# Patient Record
Sex: FEMALE | Race: WHITE | NOT HISPANIC OR LATINO | Employment: OTHER | URBAN - NONMETROPOLITAN AREA
[De-identification: names, ages, dates, MRNs, and addresses within clinical notes are randomized per-mention and may not be internally consistent; named-entity substitution may affect disease eponyms.]

---

## 2017-01-20 DIAGNOSIS — Z12.31 ENCOUNTER FOR SCREENING MAMMOGRAM FOR MALIGNANT NEOPLASM OF BREAST: Primary | ICD-10-CM

## 2017-01-24 ENCOUNTER — TRANSCRIBE ORDERS (OUTPATIENT)
Dept: LAB | Facility: OTHER | Age: 62
End: 2017-01-24

## 2017-01-24 ENCOUNTER — LAB (OUTPATIENT)
Dept: LAB | Facility: OTHER | Age: 62
End: 2017-01-24

## 2017-01-24 DIAGNOSIS — E83.42 HYPOMAGNESEMIA: ICD-10-CM

## 2017-01-24 DIAGNOSIS — E11.9 DIABETES MELLITUS WITH NO COMPLICATION (HCC): ICD-10-CM

## 2017-01-24 DIAGNOSIS — D50.9 IRON DEFICIENCY ANEMIA, UNSPECIFIED: ICD-10-CM

## 2017-01-24 DIAGNOSIS — I10 HYPERTENSION, ESSENTIAL: ICD-10-CM

## 2017-01-24 DIAGNOSIS — D50.9 IRON DEFICIENCY ANEMIA, UNSPECIFIED: Primary | ICD-10-CM

## 2017-01-24 LAB
ALBUMIN SERPL-MCNC: 4.3 G/DL (ref 3.2–5.5)
ALBUMIN/GLOB SERPL: 1.7 G/DL (ref 1–3)
ALP SERPL-CCNC: 99 U/L (ref 15–121)
ALT SERPL W P-5'-P-CCNC: 20 U/L (ref 10–60)
ANION GAP SERPL CALCULATED.3IONS-SCNC: 8 MMOL/L (ref 5–15)
ARTICHOKE IGE QN: 78 MG/DL (ref 0–129)
AST SERPL-CCNC: 23 U/L (ref 10–60)
BILIRUB SERPL-MCNC: 2.2 MG/DL (ref 0.2–1)
BUN BLD-MCNC: 17 MG/DL (ref 8–25)
BUN/CREAT SERPL: 15.5 (ref 7–25)
CALCIUM SPEC-SCNC: 9.5 MG/DL (ref 8.4–10.8)
CHLORIDE SERPL-SCNC: 105 MMOL/L (ref 100–112)
CO2 SERPL-SCNC: 29 MMOL/L (ref 20–32)
CREAT BLD-MCNC: 1.1 MG/DL (ref 0.4–1.3)
DEPRECATED RDW RBC AUTO: 52.3 FL (ref 36.4–46.3)
ERYTHROCYTE [DISTWIDTH] IN BLOOD BY AUTOMATED COUNT: 15.7 % (ref 11.5–14.5)
GFR SERPL CREATININE-BSD FRML MDRD: 50 ML/MIN/1.73 (ref 45–104)
GLOBULIN UR ELPH-MCNC: 2.6 GM/DL (ref 2.5–4.6)
GLUCOSE BLD-MCNC: 138 MG/DL (ref 70–100)
HCT VFR BLD AUTO: 39.3 % (ref 35–45)
HGB BLD-MCNC: 12.4 G/DL (ref 12–15.5)
MAGNESIUM SERPL-MCNC: 1.6 MG/DL (ref 1.8–2.5)
MCH RBC QN AUTO: 30 PG (ref 26.5–34)
MCHC RBC AUTO-ENTMCNC: 31.6 G/DL (ref 31.4–36)
MCV RBC AUTO: 94.9 FL (ref 80–98)
PLATELET # BLD AUTO: 111 10*3/MM3 (ref 150–450)
PMV BLD AUTO: 10.2 FL (ref 8–12)
POTASSIUM BLD-SCNC: 4.1 MMOL/L (ref 3.4–5.4)
PROT SERPL-MCNC: 6.9 G/DL (ref 6.7–8.2)
RBC # BLD AUTO: 4.14 10*6/MM3 (ref 3.77–5.16)
SODIUM BLD-SCNC: 142 MMOL/L (ref 134–146)
WBC NRBC COR # BLD: 75.84 10*3/MM3 (ref 3.2–9.8)

## 2017-01-24 PROCEDURE — 83036 HEMOGLOBIN GLYCOSYLATED A1C: CPT | Performed by: INTERNAL MEDICINE

## 2017-01-24 PROCEDURE — 80053 COMPREHEN METABOLIC PANEL: CPT | Performed by: INTERNAL MEDICINE

## 2017-01-24 PROCEDURE — 85027 COMPLETE CBC AUTOMATED: CPT | Performed by: INTERNAL MEDICINE

## 2017-01-24 PROCEDURE — 83721 ASSAY OF BLOOD LIPOPROTEIN: CPT | Performed by: INTERNAL MEDICINE

## 2017-01-24 PROCEDURE — 83735 ASSAY OF MAGNESIUM: CPT | Performed by: INTERNAL MEDICINE

## 2017-01-25 LAB — HBA1C MFR BLD: 6.75 % (ref 4–5.6)

## 2017-01-31 ENCOUNTER — OFFICE VISIT (OUTPATIENT)
Dept: FAMILY MEDICINE CLINIC | Facility: CLINIC | Age: 62
End: 2017-01-31

## 2017-01-31 VITALS
DIASTOLIC BLOOD PRESSURE: 70 MMHG | BODY MASS INDEX: 35.85 KG/M2 | WEIGHT: 210 LBS | HEIGHT: 64 IN | HEART RATE: 78 BPM | SYSTOLIC BLOOD PRESSURE: 118 MMHG

## 2017-01-31 DIAGNOSIS — E78.2 MIXED HYPERLIPIDEMIA: ICD-10-CM

## 2017-01-31 DIAGNOSIS — E66.09 NON MORBID OBESITY DUE TO EXCESS CALORIES: ICD-10-CM

## 2017-01-31 DIAGNOSIS — E11.9 TYPE 2 DIABETES MELLITUS WITHOUT COMPLICATION, WITHOUT LONG-TERM CURRENT USE OF INSULIN (HCC): Primary | ICD-10-CM

## 2017-01-31 DIAGNOSIS — C91.10 CHRONIC LYMPHATIC LEUKEMIA (HCC): ICD-10-CM

## 2017-01-31 DIAGNOSIS — D50.0 IRON DEFICIENCY ANEMIA SECONDARY TO BLOOD LOSS (CHRONIC): ICD-10-CM

## 2017-01-31 DIAGNOSIS — I10 ESSENTIAL HYPERTENSION: ICD-10-CM

## 2017-01-31 DIAGNOSIS — E83.42 HYPOMAGNESEMIA: ICD-10-CM

## 2017-01-31 DIAGNOSIS — E55.9 VITAMIN D DEFICIENCY: ICD-10-CM

## 2017-01-31 PROCEDURE — 99214 OFFICE O/P EST MOD 30 MIN: CPT | Performed by: INTERNAL MEDICINE

## 2017-01-31 RX ORDER — CARVEDILOL 6.25 MG/1
6.25 TABLET ORAL 2 TIMES DAILY WITH MEALS
Qty: 180 TABLET | Refills: 3 | Status: SHIPPED | OUTPATIENT
Start: 2017-01-31 | End: 2017-08-08 | Stop reason: SDUPTHER

## 2017-01-31 RX ORDER — ALPRAZOLAM 0.5 MG/1
.5-1 TABLET ORAL NIGHTLY PRN
Qty: 60 TABLET | Refills: 2 | Status: SHIPPED | OUTPATIENT
Start: 2017-01-31 | End: 2017-05-16 | Stop reason: SDUPTHER

## 2017-01-31 RX ORDER — ATORVASTATIN CALCIUM 40 MG/1
40 TABLET, FILM COATED ORAL DAILY
Qty: 90 TABLET | Refills: 3 | Status: SHIPPED | OUTPATIENT
Start: 2017-01-31 | End: 2017-08-08 | Stop reason: SDUPTHER

## 2017-01-31 RX ORDER — SPIRONOLACTONE 25 MG/1
25 TABLET ORAL 2 TIMES DAILY
Qty: 180 TABLET | Refills: 3 | Status: SHIPPED | OUTPATIENT
Start: 2017-01-31 | End: 2017-08-08 | Stop reason: SDUPTHER

## 2017-01-31 RX ORDER — FLUOXETINE 10 MG/1
30 CAPSULE ORAL EVERY MORNING
Qty: 270 CAPSULE | Refills: 3 | Status: SHIPPED | OUTPATIENT
Start: 2017-01-31 | End: 2017-08-08 | Stop reason: SDUPTHER

## 2017-01-31 RX ORDER — LISINOPRIL AND HYDROCHLOROTHIAZIDE 20; 12.5 MG/1; MG/1
1 TABLET ORAL EVERY MORNING
Qty: 90 TABLET | Refills: 3 | Status: SHIPPED | OUTPATIENT
Start: 2017-01-31 | End: 2017-08-08 | Stop reason: DRUGHIGH

## 2017-01-31 NOTE — MR AVS SNAPSHOT
Chio Mary   1/31/2017 10:00 AM   Office Visit    Dept Phone:  [phone]   Encounter #:  [id number]    Provider:  Michael Quiñonez MD   Department:  [facility]                Your Full Care Plan              Where to Get Your Medications      These medications were sent to [facility] - [place], [place] - [address] AT Portal to [facility] - [phone] [facility] [phone]   [address], [facility] [address]     Phone:  [phone]     atorvastatin 40 MG tablet    carvedilol 6.25 MG tablet    FLUoxetine 10 MG capsule    lisinopril-hydrochlorothiazide 20-12.5 MG per tablet    metFORMIN 500 MG tablet    spironolactone 25 MG tablet         You can get these medications from any pharmacy     Bring a paper prescription for each of these medications     ALPRAZolam 0.5 MG tablet            Your Updated Medication List          This list is accurate as of: 1/31/17 11:04 AM.  Always use your most recent med list.                ALPRAZolam 0.5 MG tablet   Commonly known as:  XANAX   Take 1-2 tablets by mouth At Night As Needed for sleep.       aspirin 81 MG EC tablet       atorvastatin 40 MG tablet   Commonly known as:  LIPITOR   Take 1 tablet by mouth Daily.       CALCI-MIX 1250 MG capsule   Generic drug:  calcium carbonate       carvedilol 6.25 MG tablet   Commonly known as:  COREG   Take 1 tablet by mouth 2 (Two) Times a Day With Meals.       cholecalciferol 1000 UNITS tablet   Commonly known as:  VITAMIN D3       FLUoxetine 10 MG capsule   Commonly known as:  PROzac   Take 3 capsules by mouth Every Morning.       lisinopril-hydrochlorothiazide 20-12.5 MG per tablet   Commonly known as:  PRINZIDE,ZESTORETIC   Take 1 tablet by mouth Every Morning.       magnesium oxide 400 (241.3 MG) MG tablet tablet   Commonly known as:  MAGOX       metFORMIN 500 MG tablet   Commonly known as:  GLUCOPHAGE   Take 1 tablet  by mouth 2 (Two) Times a Day With Meals.       omeprazole 40 MG capsule   Commonly known as:  priLOSEC       potassium chloride 10 MEQ CR capsule   Commonly known as:  MICRO-K       spironolactone 25 MG tablet   Commonly known as:  ALDACTONE   Take 1 tablet by mouth 2 (Two) Times a Day.       vitamin B-12 500 MCG tablet   Commonly known as:  CYANOCOBALAMIN               You Were Diagnosed With        Codes Comments    Essential hypertension    -  Primary ICD-10-CM: I10  ICD-9-CM: 401.9     Mixed hyperlipidemia     ICD-10-CM: E78.2  ICD-9-CM: 272.2     Non morbid obesity due to excess calories     ICD-10-CM: E66.09  ICD-9-CM: 278.00     Vitamin D deficiency     ICD-10-CM: E55.9  ICD-9-CM: 268.9     Type 2 diabetes mellitus without complication, without long-term current use of insulin     ICD-10-CM: E11.9  ICD-9-CM: 250.00     Chronic lymphatic leukemia     ICD-10-CM: C91.10  ICD-9-CM: 204.10     Iron deficiency anemia secondary to blood loss (chronic)     ICD-10-CM: D50.0  ICD-9-CM: 280.0     Hypomagnesemia     ICD-10-CM: E83.42  ICD-9-CM: 275.2       Instructions     None    Patient Instructions History      Upcoming Appointments     Visit Type Date Time Department    OFFICE VISIT 2017 10:00 AM [facility]    MAMMO SCREENING 2017  1:00 PM [facility]    OFFICE VISIT 2017  9:30 AM [facility]      MyChart Signup     [facility] allows you to send messages to your doctor, view your test results, renew your prescriptions, schedule appointments, and more. To sign up, go to [email or web address] and click on the Sign Up Now link in the New User? box. Enter your [facility] Activation Code exactly as it appears below along with the last four digits of your Social Security Number and your Date of Birth () to complete the sign-up process. If you do not sign up before the expiration date, you must request a new code.    [facility] Activation Code: [email or web address]  Expires: 2017   "8:47 AM    If you have questions, you can email [email or web address] or call [phone] to talk to our MyChart staff. Remember, [employer] is NOT to be used for urgent needs. For medical emergencies, dial 911.               Other Info from Your Visit           Your Appointments     Feb 07, 2017  1:00 PM CST   Mammography Screening with [facility] MAMM 1   [facility]    [address] [place] [address] [phone]            Aug 01, 2017  9:30 AM CDT   Office Visit with Michael Quiñonez MD   [facility] [place] (--)    [address] [place] [address] [address] [place] [address] [phone]           Arrive 15 minutes prior to appointment.              Allergies     Nsaids        Reason for Visit     Follow-up 6 month    Results lab      Vital Signs     Blood Pressure Pulse Height Weight Body Mass Index Smoking Status    118/70 78 64\" (162.6 cm) 210 lb (95.3 kg) 36.05 kg/m2 Former Smoker      Problems and Diagnoses Noted     Chronic lymphatic leukemia    High blood pressure    Hypomagnesemia    Iron deficiency anemia secondary to blood loss (chronic)    Mixed hyperlipidemia    Obesity    Type 2 diabetes    Vitamin D deficiency        "

## 2017-01-31 NOTE — PROGRESS NOTES
"Subjective     Chio Mary is a 61 y.o. female.     History of Present Illness   Chio is here for followup of diabetes, hypertension, high cholesterol, CLL, vitamin D deficiency, obesity, and other issues.  She has a single kidney due to prior right nephrectomy due to RCCA in 2003.    Dr. Soriano in [place] is following her CLL.  White count is considerably higher with the last 2 set of labs.  He is scheduled to see her next week.  She denies night sweats or unexplained weight loss.    She has a history of renal cell carcinoma requiring right nephrectomy approximately 12 years ago.  There is been no evidence of recurrence.    Her weight is up 14 pounds in the past 6 months.  Her blood pressure is well controlled.    She had to stop the magnesium replacement due to diarrhea.  She can only tolerate metformin once daily due to diarrhea.  Diarrhea is adequately controlled currently.    Her labs are all reviewed with results listed below.  A1c is 6.7.  White count is 76,000.  Magnesium is 1.6.  Creatinine is 1.1.  Liver functions are normal.      Review of Systems   Constitutional: Negative for chills, fatigue, fever and unexpected weight change.   HENT: Negative for congestion, ear pain, postnasal drip, sinus pressure and sore throat.    Respiratory: Negative for cough, shortness of breath and wheezing.    Cardiovascular: Negative for chest pain, palpitations and leg swelling.   Gastrointestinal: Negative for abdominal pain, blood in stool, constipation, diarrhea, nausea and vomiting.   Endocrine: Negative for cold intolerance, heat intolerance, polydipsia and polyuria.   Genitourinary: Negative for dysuria, frequency, hematuria and urgency.   Skin: Negative for rash.   Neurological: Negative for syncope and weakness.   Hematological: Does not bruise/bleed easily.   Psychiatric/Behavioral: Negative for dysphoric mood and suicidal ideas.       Objective     Visit Vitals   • /70   • Pulse 78   • Ht 64\" " (162.6 cm)   • Wt 210 lb (95.3 kg)   • BMI 36.05 kg/m2       Physical Exam   Constitutional: She is oriented to person, place, and time. She appears well-developed and well-nourished. No distress.   Very pleasant, obese.   HENT:   Head: Normocephalic and atraumatic.   Nose: Right sinus exhibits no maxillary sinus tenderness and no frontal sinus tenderness. Left sinus exhibits no maxillary sinus tenderness and no frontal sinus tenderness.   Mouth/Throat: Uvula is midline, oropharynx is clear and moist and mucous membranes are normal. No oral lesions. No tonsillar exudate.   Eyes: Conjunctivae and EOM are normal. Pupils are equal, round, and reactive to light.   Neck: Trachea normal. Neck supple. No JVD present. Carotid bruit is not present. No tracheal deviation present. No thyroid mass and no thyromegaly present.   Cardiovascular: Normal rate, regular rhythm and normal heart sounds.   No extrasystoles are present. PMI is not displaced.    No murmur heard.  Pulmonary/Chest: Effort normal and breath sounds normal. No accessory muscle usage. No respiratory distress. She has no decreased breath sounds. She has no wheezes. She has no rhonchi. She has no rales.   Abdominal: Soft. Bowel sounds are normal. She exhibits no distension. There is no hepatosplenomegaly. There is no tenderness.   Obese abdomen limits exam.       Vascular Status -  Her exam exhibits right foot vasculature normal. Her exam exhibits no right foot edema. Her exam exhibits left foot vasculature normal. Her exam exhibits no left foot edema.  Lymphadenopathy:     She has no cervical adenopathy.   Neurological: She is alert and oriented to person, place, and time. No cranial nerve deficit. Coordination normal.   Skin: Skin is warm, dry and intact. No rash noted. No cyanosis. Nails show no clubbing.   Psychiatric: She has a normal mood and affect. Her speech is normal and behavior is normal. Judgment and thought content normal.   Vitals  reviewed.      Assessment/Plan   Continue current medications.  Intensify efforts at diabetic diet, exercise, and weight loss.  Check glucose daily and notify me if glucose is not consistently at goal within a couple weeks.  If not at goal, we would need to have a second oral agent, as she cannot tolerate higher doses of metformin.  Diagnoses and all orders for this visit:    Type 2 diabetes mellitus without complication, without long-term current use of insulin  -     Comprehensive Metabolic Panel; Future  -     Hemoglobin A1c; Future  -     TSH; Future    Essential hypertension  -     CBC Auto Differential; Future  -     Comprehensive Metabolic Panel; Future    Mixed hyperlipidemia  -     Lipid Panel; Future    Non morbid obesity due to excess calories    Vitamin D deficiency    Chronic lymphatic leukemia  -     Vitamin B12; Future    Iron deficiency anemia secondary to blood loss (chronic)  -     Vitamin B12; Future  -     Ferritin; Future    Hypomagnesemia  -     Magnesium; Future    Other orders  -     spironolactone (ALDACTONE) 25 MG tablet; Take 1 tablet by mouth 2 (Two) Times a Day.  -     FLUoxetine (PROzac) 10 MG capsule; Take 3 capsules by mouth Every Morning.  -     metFORMIN (GLUCOPHAGE) 500 MG tablet; Take 1 tablet by mouth 2 (Two) Times a Day With Meals.  -     carvedilol (COREG) 6.25 MG tablet; Take 1 tablet by mouth 2 (Two) Times a Day With Meals.  -     lisinopril-hydrochlorothiazide (PRINZIDE,ZESTORETIC) 20-12.5 MG per tablet; Take 1 tablet by mouth Every Morning.  -     atorvastatin (LIPITOR) 40 MG tablet; Take 1 tablet by mouth Daily.  -     ALPRAZolam (XANAX) 0.5 MG tablet; Take 1-2 tablets by mouth At Night As Needed for sleep.        Lab on 01/24/2017   Component Date Value Ref Range Status   • Hemoglobin A1C 01/24/2017 6.75* 4 - 5.6 % Final   • LDL Cholesterol  01/24/2017 78  0 - 129 mg/dL Final   • WBC 01/24/2017 75.84* 3.20 - 9.80 10*3/mm3 Final   • RBC 01/24/2017 4.14  3.77 - 5.16  10*6/mm3 Final   • Hemoglobin 01/24/2017 12.4  12.0 - 15.5 g/dL Final   • Hematocrit 01/24/2017 39.3  35.0 - 45.0 % Final   • MCV 01/24/2017 94.9  80.0 - 98.0 fL Final   • MCH 01/24/2017 30.0  26.5 - 34.0 pg Final   • MCHC 01/24/2017 31.6  31.4 - 36.0 g/dL Final   • RDW 01/24/2017 15.7* 11.5 - 14.5 % Final   • RDW-SD 01/24/2017 52.3* 36.4 - 46.3 fl Final   • MPV 01/24/2017 10.2  8.0 - 12.0 fL Final   • Platelets 01/24/2017 111* 150 - 450 10*3/mm3 Final   • Magnesium 01/24/2017 1.6* 1.8 - 2.5 mg/dL Final   • Glucose 01/24/2017 138* 70 - 100 mg/dL Final   • BUN 01/24/2017 17  8 - 25 mg/dL Final   • Creatinine 01/24/2017 1.10  0.40 - 1.30 mg/dL Final   • Sodium 01/24/2017 142  134 - 146 mmol/L Final   • Potassium 01/24/2017 4.1  3.4 - 5.4 mmol/L Final   • Chloride 01/24/2017 105  100 - 112 mmol/L Final   • CO2 01/24/2017 29.0  20.0 - 32.0 mmol/L Final   • Calcium 01/24/2017 9.5  8.4 - 10.8 mg/dL Final   • Total Protein 01/24/2017 6.9  6.7 - 8.2 g/dL Final   • Albumin 01/24/2017 4.30  3.20 - 5.50 g/dL Final   • ALT (SGPT) 01/24/2017 20  10 - 60 U/L Final   • AST (SGOT) 01/24/2017 23  10 - 60 U/L Final   • Alkaline Phosphatase 01/24/2017 99  15 - 121 U/L Final   • Total Bilirubin 01/24/2017 2.2* 0.2 - 1.0 mg/dL Final   • eGFR Non African Amer 01/24/2017 50  45 - 104 mL/min/1.73 Final   • Globulin 01/24/2017 2.6  2.5 - 4.6 gm/dL Final   • A/G Ratio 01/24/2017 1.7  1.0 - 3.0 g/dL Final   • BUN/Creatinine Ratio 01/24/2017 15.5  7.0 - 25.0 Final   • Anion Gap 01/24/2017 8.0  5.0 - 15.0 mmol/L Final   ]

## 2017-02-07 ENCOUNTER — APPOINTMENT (OUTPATIENT)
Dept: MAMMOGRAPHY | Facility: CLINIC | Age: 62
End: 2017-02-07

## 2017-02-07 PROCEDURE — 77063 BREAST TOMOSYNTHESIS BI: CPT | Performed by: INTERNAL MEDICINE

## 2017-02-07 PROCEDURE — G0202 SCR MAMMO BI INCL CAD: HCPCS | Performed by: INTERNAL MEDICINE

## 2017-05-16 ENCOUNTER — OFFICE VISIT (OUTPATIENT)
Dept: FAMILY MEDICINE CLINIC | Facility: CLINIC | Age: 62
End: 2017-05-16

## 2017-05-16 VITALS
TEMPERATURE: 98.2 F | WEIGHT: 209 LBS | BODY MASS INDEX: 35.68 KG/M2 | HEART RATE: 64 BPM | SYSTOLIC BLOOD PRESSURE: 120 MMHG | HEIGHT: 64 IN | DIASTOLIC BLOOD PRESSURE: 80 MMHG

## 2017-05-16 DIAGNOSIS — F32.A DEPRESSIVE DISORDER: ICD-10-CM

## 2017-05-16 DIAGNOSIS — I10 ESSENTIAL HYPERTENSION: ICD-10-CM

## 2017-05-16 DIAGNOSIS — F41.1 GENERALIZED ANXIETY DISORDER: Primary | ICD-10-CM

## 2017-05-16 DIAGNOSIS — E66.09 NON MORBID OBESITY DUE TO EXCESS CALORIES: ICD-10-CM

## 2017-05-16 PROCEDURE — 99213 OFFICE O/P EST LOW 20 MIN: CPT | Performed by: INTERNAL MEDICINE

## 2017-05-16 RX ORDER — ALPRAZOLAM 0.5 MG/1
.5-1 TABLET ORAL NIGHTLY PRN
Qty: 60 TABLET | Refills: 2 | Status: SHIPPED | OUTPATIENT
Start: 2017-05-16 | End: 2017-10-03 | Stop reason: SDUPTHER

## 2017-08-01 ENCOUNTER — LAB (OUTPATIENT)
Dept: LAB | Facility: OTHER | Age: 62
End: 2017-08-01

## 2017-08-01 DIAGNOSIS — E11.9 TYPE 2 DIABETES MELLITUS WITHOUT COMPLICATION, WITHOUT LONG-TERM CURRENT USE OF INSULIN (HCC): ICD-10-CM

## 2017-08-01 DIAGNOSIS — E78.2 MIXED HYPERLIPIDEMIA: ICD-10-CM

## 2017-08-01 DIAGNOSIS — E83.42 HYPOMAGNESEMIA: ICD-10-CM

## 2017-08-01 DIAGNOSIS — D50.0 IRON DEFICIENCY ANEMIA SECONDARY TO BLOOD LOSS (CHRONIC): ICD-10-CM

## 2017-08-01 DIAGNOSIS — I10 ESSENTIAL HYPERTENSION: ICD-10-CM

## 2017-08-01 DIAGNOSIS — C91.10 CHRONIC LYMPHATIC LEUKEMIA (HCC): ICD-10-CM

## 2017-08-01 LAB
ALBUMIN SERPL-MCNC: 4.3 G/DL (ref 3.2–5.5)
ALBUMIN/GLOB SERPL: 1.7 G/DL (ref 1–3)
ALP SERPL-CCNC: 100 U/L (ref 15–121)
ALT SERPL W P-5'-P-CCNC: 16 U/L (ref 10–60)
ANION GAP SERPL CALCULATED.3IONS-SCNC: 12 MMOL/L (ref 5–15)
ANISOCYTOSIS BLD QL: ABNORMAL
AST SERPL-CCNC: 24 U/L (ref 10–60)
BILIRUB SERPL-MCNC: 2.3 MG/DL (ref 0.2–1)
BUN BLD-MCNC: 27 MG/DL (ref 8–25)
BUN/CREAT SERPL: 22.5 (ref 7–25)
CALCIUM SPEC-SCNC: 9.3 MG/DL (ref 8.4–10.8)
CHLORIDE SERPL-SCNC: 105 MMOL/L (ref 100–112)
CHOLEST SERPL-MCNC: 136 MG/DL (ref 150–200)
CO2 SERPL-SCNC: 26 MMOL/L (ref 20–32)
CREAT BLD-MCNC: 1.2 MG/DL (ref 0.4–1.3)
DEPRECATED RDW RBC AUTO: 53 FL (ref 36.4–46.3)
EOSINOPHIL # BLD MANUAL: 0.86 10*3/MM3 (ref 0–0.7)
EOSINOPHIL NFR BLD MANUAL: 1 % (ref 0–7)
ERYTHROCYTE [DISTWIDTH] IN BLOOD BY AUTOMATED COUNT: 16.1 % (ref 11.5–14.5)
GFR SERPL CREATININE-BSD FRML MDRD: 45 ML/MIN/1.73 (ref 45–104)
GLOBULIN UR ELPH-MCNC: 2.6 GM/DL (ref 2.5–4.6)
GLUCOSE BLD-MCNC: 134 MG/DL (ref 70–100)
HCT VFR BLD AUTO: 37.1 % (ref 35–45)
HDLC SERPL-MCNC: 25 MG/DL (ref 35–100)
HGB BLD-MCNC: 11.6 G/DL (ref 12–15.5)
LDLC SERPL CALC-MCNC: 79 MG/DL
LDLC/HDLC SERPL: 3.16 {RATIO}
LYMPHOCYTES # BLD MANUAL: 78.83 10*3/MM3 (ref 0.6–4.2)
LYMPHOCYTES NFR BLD MANUAL: 2 % (ref 0–12)
LYMPHOCYTES NFR BLD MANUAL: 92 % (ref 10–50)
MAGNESIUM SERPL-MCNC: 1.6 MG/DL (ref 1.8–2.5)
MCH RBC QN AUTO: 29.6 PG (ref 26.5–34)
MCHC RBC AUTO-ENTMCNC: 31.3 G/DL (ref 31.4–36)
MCV RBC AUTO: 94.6 FL (ref 80–98)
MONOCYTES # BLD AUTO: 1.71 10*3/MM3 (ref 0–0.9)
NEUTROPHILS # BLD AUTO: 4.28 10*3/MM3 (ref 2–8.6)
NEUTROPHILS NFR BLD MANUAL: 5 % (ref 37–80)
PLATELET # BLD AUTO: 82 10*3/MM3 (ref 150–450)
PMV BLD AUTO: 10.3 FL (ref 8–12)
POTASSIUM BLD-SCNC: 4.1 MMOL/L (ref 3.4–5.4)
PROT SERPL-MCNC: 6.9 G/DL (ref 6.7–8.2)
RBC # BLD AUTO: 3.92 10*6/MM3 (ref 3.77–5.16)
SCAN SLIDE: NORMAL
SMALL PLATELETS BLD QL SMEAR: ABNORMAL
SODIUM BLD-SCNC: 143 MMOL/L (ref 134–146)
TRIGL SERPL-MCNC: 160 MG/DL (ref 35–160)
VLDLC SERPL-MCNC: 32 MG/DL
WBC MORPH BLD: NORMAL
WBC NRBC COR # BLD: 85.68 10*3/MM3 (ref 3.2–9.8)

## 2017-08-01 PROCEDURE — 82607 VITAMIN B-12: CPT | Performed by: INTERNAL MEDICINE

## 2017-08-01 PROCEDURE — 83036 HEMOGLOBIN GLYCOSYLATED A1C: CPT | Performed by: INTERNAL MEDICINE

## 2017-08-01 PROCEDURE — 36415 COLL VENOUS BLD VENIPUNCTURE: CPT | Performed by: INTERNAL MEDICINE

## 2017-08-01 PROCEDURE — 80061 LIPID PANEL: CPT | Performed by: INTERNAL MEDICINE

## 2017-08-01 PROCEDURE — 82728 ASSAY OF FERRITIN: CPT | Performed by: INTERNAL MEDICINE

## 2017-08-01 PROCEDURE — 84443 ASSAY THYROID STIM HORMONE: CPT | Performed by: INTERNAL MEDICINE

## 2017-08-01 PROCEDURE — 80053 COMPREHEN METABOLIC PANEL: CPT | Performed by: INTERNAL MEDICINE

## 2017-08-01 PROCEDURE — 85025 COMPLETE CBC W/AUTO DIFF WBC: CPT | Performed by: INTERNAL MEDICINE

## 2017-08-01 PROCEDURE — 83735 ASSAY OF MAGNESIUM: CPT | Performed by: INTERNAL MEDICINE

## 2017-08-02 LAB
FERRITIN SERPL-MCNC: 26 NG/ML (ref 11.1–264)
HBA1C MFR BLD: 6.7 % (ref 4–5.6)
TSH SERPL DL<=0.05 MIU/L-ACNC: 1.32 MIU/ML (ref 0.46–4.68)
VIT B12 BLD-MCNC: 699 PG/ML (ref 239–931)

## 2017-08-08 ENCOUNTER — OFFICE VISIT (OUTPATIENT)
Dept: FAMILY MEDICINE CLINIC | Facility: CLINIC | Age: 62
End: 2017-08-08

## 2017-08-08 VITALS
WEIGHT: 203 LBS | TEMPERATURE: 98.8 F | SYSTOLIC BLOOD PRESSURE: 102 MMHG | DIASTOLIC BLOOD PRESSURE: 70 MMHG | HEART RATE: 80 BPM | BODY MASS INDEX: 34.66 KG/M2 | HEIGHT: 64 IN

## 2017-08-08 DIAGNOSIS — C91.10 CHRONIC LYMPHATIC LEUKEMIA (HCC): Chronic | ICD-10-CM

## 2017-08-08 DIAGNOSIS — E78.2 MIXED HYPERLIPIDEMIA: Chronic | ICD-10-CM

## 2017-08-08 DIAGNOSIS — D50.0 IRON DEFICIENCY ANEMIA SECONDARY TO BLOOD LOSS (CHRONIC): Chronic | ICD-10-CM

## 2017-08-08 DIAGNOSIS — E55.9 VITAMIN D DEFICIENCY: Chronic | ICD-10-CM

## 2017-08-08 DIAGNOSIS — F41.1 GENERALIZED ANXIETY DISORDER: Chronic | ICD-10-CM

## 2017-08-08 DIAGNOSIS — E83.42 HYPOMAGNESEMIA: Chronic | ICD-10-CM

## 2017-08-08 DIAGNOSIS — E11.9 TYPE 2 DIABETES MELLITUS WITHOUT COMPLICATION, WITHOUT LONG-TERM CURRENT USE OF INSULIN (HCC): Primary | Chronic | ICD-10-CM

## 2017-08-08 DIAGNOSIS — E66.09 NON MORBID OBESITY DUE TO EXCESS CALORIES: Chronic | ICD-10-CM

## 2017-08-08 DIAGNOSIS — F32.A DEPRESSIVE DISORDER: Chronic | ICD-10-CM

## 2017-08-08 DIAGNOSIS — I10 ESSENTIAL HYPERTENSION: Chronic | ICD-10-CM

## 2017-08-08 DIAGNOSIS — D36.9 TUBULAR ADENOMA: Chronic | ICD-10-CM

## 2017-08-08 DIAGNOSIS — G47.9 SLEEP DISORDER: Chronic | ICD-10-CM

## 2017-08-08 PROCEDURE — 99214 OFFICE O/P EST MOD 30 MIN: CPT | Performed by: INTERNAL MEDICINE

## 2017-08-08 RX ORDER — CARVEDILOL 6.25 MG/1
6.25 TABLET ORAL 2 TIMES DAILY WITH MEALS
Qty: 180 TABLET | Refills: 3 | Status: SHIPPED | OUTPATIENT
Start: 2017-08-08 | End: 2018-02-12 | Stop reason: SDUPTHER

## 2017-08-08 RX ORDER — ATORVASTATIN CALCIUM 40 MG/1
40 TABLET, FILM COATED ORAL DAILY
Qty: 90 TABLET | Refills: 3 | Status: SHIPPED | OUTPATIENT
Start: 2017-08-08 | End: 2018-02-12 | Stop reason: SDUPTHER

## 2017-08-08 RX ORDER — FLUOXETINE 10 MG/1
30 CAPSULE ORAL EVERY MORNING
Qty: 270 CAPSULE | Refills: 3 | Status: SHIPPED | OUTPATIENT
Start: 2017-08-08 | End: 2018-02-12 | Stop reason: SDUPTHER

## 2017-08-08 RX ORDER — SPIRONOLACTONE 25 MG/1
25 TABLET ORAL 2 TIMES DAILY
Qty: 180 TABLET | Refills: 3 | Status: SHIPPED | OUTPATIENT
Start: 2017-08-08 | End: 2018-02-12 | Stop reason: SDUPTHER

## 2017-08-08 RX ORDER — LISINOPRIL AND HYDROCHLOROTHIAZIDE 12.5; 1 MG/1; MG/1
1 TABLET ORAL DAILY
Qty: 90 TABLET | Refills: 3 | Status: SHIPPED | OUTPATIENT
Start: 2017-08-08 | End: 2018-02-12 | Stop reason: ALTCHOICE

## 2017-08-08 RX ORDER — LISINOPRIL AND HYDROCHLOROTHIAZIDE 20; 12.5 MG/1; MG/1
1 TABLET ORAL EVERY MORNING
Qty: 90 TABLET | Refills: 3 | Status: CANCELLED | OUTPATIENT
Start: 2017-08-08

## 2017-08-08 NOTE — PROGRESS NOTES
Subjective     Chio Mary is a 61 y.o. female.     History of Present Illness   Chio is our very pleasant, complex patient here for followup of multiple medical issues including diabetes, hypertension, high cholesterol, CLL, vitamin D deficiency, obesity, and other issues.  She has a single kidney due to prior right nephrectomy due to RCCA in 2003.     Dr. Soriano in [place] is following her CLL.  He is scheduled to see her next month.  She denies frequent night sweats or unexplained weight loss.  Her weight is down 7 pounds in the past 6 months, but her weight was up 14 pounds last year.     She has a history of renal cell carcinoma requiring right nephrectomy approximately 13 years ago.  There has been no evidence of recurrence.    Her blood pressure is lower than necessary.  We are going to decrease the dose of her lisinopril HCT.  She also continues on spironolactone and Coreg.    Her labs are all reviewed with results listed below.  White count is 86,000.  Hemoglobin is stable at 6.7.  LDL is 79.  HDL is lower at 25.  Hemoglobin is slightly lower.  Ferritin is low normal.  B-12 is normal with oral replacement.    Review of Systems   Constitutional: Negative for chills, fatigue, fever and unexpected weight change.   HENT: Negative for congestion, ear pain, postnasal drip, sinus pressure and sore throat.    Respiratory: Negative for cough, shortness of breath and wheezing.    Cardiovascular: Negative for chest pain, palpitations and leg swelling.   Gastrointestinal: Negative for abdominal pain, blood in stool, constipation, diarrhea, nausea and vomiting.   Endocrine: Negative for cold intolerance, heat intolerance, polydipsia and polyuria.   Genitourinary: Negative for dysuria, frequency, hematuria and urgency.   Skin: Negative for rash.   Neurological: Negative for syncope and weakness.   Hematological: Negative for adenopathy. Does not bruise/bleed easily.   Psychiatric/Behavioral: Negative for  "dysphoric mood and suicidal ideas.       Objective     /70  Pulse 80  Temp 98.8 °F (37.1 °C) (Oral)   Ht 64\" (162.6 cm)  Wt 203 lb (92.1 kg)  BMI 34.84 kg/m2    Physical Exam   Constitutional: She is oriented to person, place, and time. She appears well-developed and well-nourished. No distress.   Very pleasant, obese.    HENT:   Head: Normocephalic and atraumatic.   Eyes: EOM are normal. Pupils are equal, round, and reactive to light.   Neck: Neck supple. No JVD present. No thyromegaly present.   Cardiovascular: Normal rate, regular rhythm and normal heart sounds.    Pulmonary/Chest: Effort normal and breath sounds normal. No accessory muscle usage. No respiratory distress. She has no wheezes. She has no rales.   Abdominal: Soft. Bowel sounds are normal. She exhibits no distension. There is no tenderness.   Obese abdomen limits exam.    Musculoskeletal: She exhibits no edema.       Vascular Status -  Her exam exhibits right foot vasculature abnormal (Multiple varicosities noted to bilateral lower extremities. ). Her exam exhibits left foot vasculature abnormal.  Lymphadenopathy:     She has no cervical adenopathy.   Neurological: She is alert and oriented to person, place, and time. No cranial nerve deficit.   Skin: Skin is warm and dry.   Psychiatric: She has a normal mood and affect. Her speech is normal and behavior is normal.   Vitals reviewed.      PHQ-9 Depression Screening 8/8/2017   Little interest or pleasure in doing things 0   Feeling down, depressed, or hopeless 0   Trouble falling or staying asleep, or sleeping too much -   Feeling tired or having little energy -   Poor appetite or overeating -   Feeling bad about yourself - or that you are a failure or have let yourself or your family down -   Trouble concentrating on things, such as reading the newspaper or watching television -   Moving or speaking so slowly that other people could have noticed. Or the opposite - being so fidgety or " restless that you have been moving around a lot more than usual -   Thoughts that you would be better off dead, or of hurting yourself in some way -   PHQ-9 Total Score 0   If you checked off any problems, how difficult have these problems made it for you to do your work, take care of things at home, or get along with other people? -       Assessment/Plan   Decrease lisinopril HCT to 10/12.5 every morning.  Continue the Coreg.  Continue the Spironolactone for now.  We will watch her potassium level.  Come by for blood pressure recheck in 2 weeks.  No appointment necessary for the blood pressure recheck.    Continue the Lipitor and dietary efforts.    Continue the metformin and diabetic diet.  Continue monitoring glucose daily.    Continue to follow with Dr. Soriano as he recommends.    Continue the Loxitane and continue the use of Xanax as needed.    Continue the omeprazole for gastritis/GERD symptoms.    Return to clinic in 6 months with fasting labs prior.  Diagnoses and all orders for this visit:    Type 2 diabetes mellitus without complication, without long-term current use of insulin  -     CBC Auto Differential; Future  -     Comprehensive Metabolic Panel; Future  -     Hemoglobin A1c; Future  -     LDL Cholesterol, Direct; Future  -     Microalbumin / Creatinine Urine Ratio; Future    Iron deficiency anemia secondary to blood loss (chronic)  -     CBC Auto Differential; Future  -     Vitamin B12; Future  -     Ferritin; Future    Chronic lymphatic leukemia    Sleep disorder    Hypomagnesemia    Generalized anxiety disorder    Depressive disorder    Tubular adenoma - h/o. Dr. Sparrow follows.    Mixed hyperlipidemia    Essential hypertension    Vitamin D deficiency    Non morbid obesity due to excess calories    Other orders  -     spironolactone (ALDACTONE) 25 MG tablet; Take 1 tablet by mouth 2 (Two) Times a Day.  -     FLUoxetine (PROzac) 10 MG capsule; Take 3 capsules by mouth Every Morning.  -      metFORMIN (GLUCOPHAGE) 500 MG tablet; Take 1 tablet by mouth 2 (Two) Times a Day With Meals.  -     carvedilol (COREG) 6.25 MG tablet; Take 1 tablet by mouth 2 (Two) Times a Day With Meals.  -     Cancel: lisinopril-hydrochlorothiazide (PRINZIDE,ZESTORETIC) 20-12.5 MG per tablet; Take 1 tablet by mouth Every Morning.  -     atorvastatin (LIPITOR) 40 MG tablet; Take 1 tablet by mouth Daily.  -     lisinopril-hydrochlorothiazide (PRINZIDE,ZESTORETIC) 10-12.5 MG per tablet; Take 1 tablet by mouth Daily.        Lab on 08/01/2017   Component Date Value Ref Range Status   • WBC 08/01/2017 85.68* 3.20 - 9.80 10*3/mm3 Final   • RBC 08/01/2017 3.92  3.77 - 5.16 10*6/mm3 Final   • Hemoglobin 08/01/2017 11.6* 12.0 - 15.5 g/dL Final   • Hematocrit 08/01/2017 37.1  35.0 - 45.0 % Final   • MCV 08/01/2017 94.6  80.0 - 98.0 fL Final   • MCH 08/01/2017 29.6  26.5 - 34.0 pg Final   • MCHC 08/01/2017 31.3* 31.4 - 36.0 g/dL Final   • RDW 08/01/2017 16.1* 11.5 - 14.5 % Final   • RDW-SD 08/01/2017 53.0* 36.4 - 46.3 fl Final   • MPV 08/01/2017 10.3  8.0 - 12.0 fL Final   • Platelets 08/01/2017 82* 150 - 450 10*3/mm3 Final   • Glucose 08/01/2017 134* 70 - 100 mg/dL Final   • BUN 08/01/2017 27* 8 - 25 mg/dL Final   • Creatinine 08/01/2017 1.20  0.40 - 1.30 mg/dL Final   • Sodium 08/01/2017 143  134 - 146 mmol/L Final   • Potassium 08/01/2017 4.1  3.4 - 5.4 mmol/L Final   • Chloride 08/01/2017 105  100 - 112 mmol/L Final   • CO2 08/01/2017 26.0  20.0 - 32.0 mmol/L Final   • Calcium 08/01/2017 9.3  8.4 - 10.8 mg/dL Final   • Total Protein 08/01/2017 6.9  6.7 - 8.2 g/dL Final   • Albumin 08/01/2017 4.30  3.20 - 5.50 g/dL Final   • ALT (SGPT) 08/01/2017 16  10 - 60 U/L Final   • AST (SGOT) 08/01/2017 24  10 - 60 U/L Final   • Alkaline Phosphatase 08/01/2017 100  15 - 121 U/L Final   • Total Bilirubin 08/01/2017 2.3* 0.2 - 1.0 mg/dL Final   • eGFR Non African Amer 08/01/2017 45  45 - 104 mL/min/1.73 Final   • Globulin 08/01/2017 2.6  2.5 - 4.6  gm/dL Final   • A/G Ratio 08/01/2017 1.7  1.0 - 3.0 g/dL Final   • BUN/Creatinine Ratio 08/01/2017 22.5  7.0 - 25.0 Final   • Anion Gap 08/01/2017 12.0  5.0 - 15.0 mmol/L Final   • Hemoglobin A1C 08/01/2017 6.7* 4 - 5.6 % Final   • Total Cholesterol 08/01/2017 136* 150 - 200 mg/dL Final   • Triglycerides 08/01/2017 160  35 - 160 mg/dL Final   • HDL Cholesterol 08/01/2017 25* 35 - 100 mg/dL Final   • LDL Cholesterol  08/01/2017 79  mg/dL Final   • VLDL Cholesterol 08/01/2017 32  mg/dL Final   • LDL/HDL Ratio 08/01/2017 3.16   Final   • Vitamin B-12 08/01/2017 699  239 - 931 pg/mL Final   • Ferritin 08/01/2017 26.00  11.10 - 264.00 ng/mL Final   • TSH 08/01/2017 1.320  0.460 - 4.680 mIU/mL Final   • Magnesium 08/01/2017 1.6* 1.8 - 2.5 mg/dL Final   • Scan Slide 08/01/2017    Final    See Manual Differential Results   • Neutrophil % 08/01/2017 5.0* 37.0 - 80.0 % Final   • Lymphocyte % 08/01/2017 92.0* 10.0 - 50.0 % Final   • Monocyte % 08/01/2017 2.0  0.0 - 12.0 % Final   • Eosinophil % 08/01/2017 1.0  0.0 - 7.0 % Final   • Neutrophils Absolute 08/01/2017 4.28  2.00 - 8.60 10*3/mm3 Final   • Lymphocytes Absolute 08/01/2017 78.83* 0.60 - 4.20 10*3/mm3 Final   • Monocytes Absolute 08/01/2017 1.71* 0.00 - 0.90 10*3/mm3 Final   • Eosinophils Absolute 08/01/2017 0.86* 0.00 - 0.70 10*3/mm3 Final   • Anisocytosis 08/01/2017 Slight/1+  None Seen Final   • WBC Morphology 08/01/2017 Normal  Normal Final   • Platelet Estimate 08/01/2017 Decreased  Normal Final   ]

## 2017-08-08 NOTE — PATIENT INSTRUCTIONS
Calorie Counting for Weight Loss  Calories are energy you get from the things you eat and drink. Your body uses this energy to keep you going throughout the day. The number of calories you eat affects your weight. When you eat more calories than your body needs, your body stores the extra calories as fat. When you eat fewer calories than your body needs, your body burns fat to get the energy it needs.  Calorie counting means keeping track of how many calories you eat and drink each day. If you make sure to eat fewer calories than your body needs, you should lose weight. In order for calorie counting to work, you will need to eat the number of calories that are right for you in a day to lose a healthy amount of weight per week. A healthy amount of weight to lose per week is usually 1-2 lb (0.5-0.9 kg). A dietitian can determine how many calories you need in a day and give you suggestions on how to reach your calorie goal.   WHAT IS MY MY PLAN?  My goal is to have __________ calories per day.   If I have this many calories per day, I should lose around __________ pounds per week.  WHAT DO I NEED TO KNOW ABOUT CALORIE COUNTING?  In order to meet your daily calorie goal, you will need to:  · Find out how many calories are in each food you would like to eat. Try to do this before you eat.  · Decide how much of the food you can eat.  · Write down what you ate and how many calories it had. Doing this is called keeping a food log.  WHERE DO I FIND CALORIE INFORMATION?  The number of calories in a food can be found on a Nutrition Facts label. Note that all the information on a label is based on a specific serving of the food. If a food does not have a Nutrition Facts label, try to look up the calories online or ask your dietitian for help.  HOW DO I DECIDE HOW MUCH TO EAT?  To decide how much of the food you can eat, you will need to consider both the number of calories in one serving and the size of one serving. This  information can be found on the Nutrition Facts label. If a food does not have a Nutrition Facts label, look up the information online or ask your dietitian for help.  Remember that calories are listed per serving. If you choose to have more than one serving of a food, you will have to multiply the calories per serving by the amount of servings you plan to eat. For example, the label on a package of bread might say that a serving size is 1 slice and that there are 90 calories in a serving. If you eat 1 slice, you will have eaten 90 calories. If you eat 2 slices, you will have eaten 180 calories.  HOW DO I KEEP A FOOD LOG?  After each meal, record the following information in your food log:  · What you ate.  · How much of it you ate.  · How many calories it had.  · Then, add up your calories.  Keep your food log near you, such as in a small notebook in your pocket. Another option is to use a mobile eloisa or website. Some programs will calculate calories for you and show you how many calories you have left each time you add an item to the log.  WHAT ARE SOME CALORIE COUNTING TIPS?  · Use your calories on foods and drinks that will fill you up and not leave you hungry. Some examples of this include foods like nuts and nut butters, vegetables, lean proteins, and high-fiber foods (more than 5 g fiber per serving).  · Eat nutritious foods and avoid empty calories. Empty calories are calories you get from foods or beverages that do not have many nutrients, such as candy and soda. It is better to have a nutritious high-calorie food (such as an avocado) than a food with few nutrients (such as a bag of chips).  · Know how many calories are in the foods you eat most often. This way, you do not have to look up how many calories they have each time you eat them.  · Look out for foods that may seem like low-calorie foods but are really high-calorie foods, such as baked goods, soda, and fat-free candy.  · Pay attention to calories  in drinks. Drinks such as sodas, specialty coffee drinks, alcohol, and juices have a lot of calories yet do not fill you up. Choose low-calorie drinks like water and diet drinks.  · Focus your calorie counting efforts on higher calorie items. Logging the calories in a garden salad that contains only vegetables is less important than calculating the calories in a milk shake.  · Find a way of tracking calories that works for you. Get creative. Most people who are successful find ways to keep track of how much they eat in a day, even if they do not count every calorie.  WHAT ARE SOME PORTION CONTROL TIPS?  · Know how many calories are in a serving. This will help you know how many servings of a certain food you can have.  · Use a measuring cup to measure serving sizes. This is helpful when you start out. With time, you will be able to estimate serving sizes for some foods.  · Take some time to put servings of different foods on your favorite plates, bowls, and cups so you know what a serving looks like.  · Try not to eat straight from a bag or box. Doing this can lead to overeating. Put the amount you would like to eat in a cup or on a plate to make sure you are eating the right portion.  · Use smaller plates, glasses, and bowls to prevent overeating. This is a quick and easy way to practice portion control. If your plate is smaller, less food can fit on it.  · Try not to multitask while eating, such as watching TV or using your computer. If it is time to eat, sit down at a table and enjoy your food. Doing this will help you to start recognizing when you are full. It will also make you more aware of what and how much you are eating.  HOW CAN I CALORIE COUNT WHEN EATING OUT?  · Ask for smaller portion sizes or child-sized portions.  · Consider sharing an entree and sides instead of getting your own entree.  · If you get your own entree, eat only half. Ask for a box at the beginning of your meal and put the rest of your  "entree in it so you are not tempted to eat it.  · Look for the calories on the menu. If calories are listed, choose the lower calorie options.  · Choose dishes that include vegetables, fruits, whole grains, low-fat dairy products, and lean protein. Focusing on smart food choices from each of the 5 food groups can help you stay on track at restaurants.  · Choose items that are boiled, broiled, grilled, or steamed.  · Choose water, milk, unsweetened iced tea, or other drinks without added sugars. If you want an alcoholic beverage, choose a lower calorie option. For example, a regular chapo can have up to 700 calories and a glass of wine has around 150.  · Stay away from items that are buttered, battered, fried, or served with cream sauce. Items labeled \"crispy\" are usually fried, unless stated otherwise.  · Ask for dressings, sauces, and syrups on the side. These are usually very high in calories, so do not eat much of them.  · Watch out for salads. Many people think salads are a healthy option, but this is often not the case. Many salads come with rodriguez, fried chicken, lots of cheese, fried chips, and dressing. All of these items have a lot of calories. If you want a salad, choose a garden salad and ask for grilled meats or steak. Ask for the dressing on the side, or ask for olive oil and vinegar or lemon to use as dressing.  · Estimate how many servings of a food you are given. For example, a serving of cooked rice is ½ cup or about the size of half a tennis ball or one cupcake wrapper. Knowing serving sizes will help you be aware of how much food you are eating at restaurants. The list below tells you how big or small some common portion sizes are based on everyday objects.    1 oz--4 stacked dice.    3 oz--1 deck of cards.    1 tsp--1 dice.    1 Tbsp--½ a Ping-Pong ball.    2 Tbsp--1 Ping-Pong ball.    ½ cup--1 tennis ball or 1 cupcake wrapper.    1 cup--1 baseball.     This information is not intended to " replace advice given to you by your health care provider. Make sure you discuss any questions you have with your health care provider.     Document Released: 12/18/2006 Document Revised: 01/08/2016 Document Reviewed: 10/23/2014  [employer] Interactive Patient Education ©2017 [employer]

## 2017-08-23 ENCOUNTER — DOCUMENTATION (OUTPATIENT)
Dept: FAMILY MEDICINE CLINIC | Facility: CLINIC | Age: 62
End: 2017-08-23

## 2017-10-03 ENCOUNTER — OFFICE VISIT (OUTPATIENT)
Dept: FAMILY MEDICINE CLINIC | Facility: CLINIC | Age: 62
End: 2017-10-03

## 2017-10-03 VITALS
WEIGHT: 208.8 LBS | DIASTOLIC BLOOD PRESSURE: 70 MMHG | HEIGHT: 64 IN | HEART RATE: 80 BPM | TEMPERATURE: 98.1 F | BODY MASS INDEX: 35.65 KG/M2 | SYSTOLIC BLOOD PRESSURE: 124 MMHG

## 2017-10-03 DIAGNOSIS — C91.10 CHRONIC LYMPHATIC LEUKEMIA (HCC): ICD-10-CM

## 2017-10-03 DIAGNOSIS — I10 ESSENTIAL HYPERTENSION: Chronic | ICD-10-CM

## 2017-10-03 DIAGNOSIS — R59.1 LYMPHADENOPATHY: ICD-10-CM

## 2017-10-03 DIAGNOSIS — D69.6 THROMBOCYTOPENIA (HCC): ICD-10-CM

## 2017-10-03 DIAGNOSIS — F41.1 GENERALIZED ANXIETY DISORDER: Primary | Chronic | ICD-10-CM

## 2017-10-03 LAB
BASOPHILS # BLD AUTO: 0.21 10*3/MM3 (ref 0–0.2)
BASOPHILS NFR BLD AUTO: 0.2 % (ref 0–2)
DEPRECATED RDW RBC AUTO: 56.8 FL (ref 36.4–46.3)
EOSINOPHIL # BLD AUTO: 0.13 10*3/MM3 (ref 0–0.7)
EOSINOPHIL NFR BLD AUTO: 0.1 % (ref 0–7)
ERYTHROCYTE [DISTWIDTH] IN BLOOD BY AUTOMATED COUNT: 16.4 % (ref 11.5–14.5)
HCT VFR BLD AUTO: 36.4 % (ref 35–45)
HGB BLD-MCNC: 11 G/DL (ref 12–15.5)
IMM GRANULOCYTES # BLD: ABNORMAL 10*3/MM3 (ref 0–0.02)
IMM GRANULOCYTES NFR BLD: ABNORMAL % (ref 0–0.5)
LYMPHOCYTES # BLD AUTO: ABNORMAL 10*3/MM3 (ref 0.6–4.2)
LYMPHOCYTES NFR BLD AUTO: ABNORMAL % (ref 10–50)
MCH RBC QN AUTO: 28.9 PG (ref 26.5–34)
MCHC RBC AUTO-ENTMCNC: 30.2 G/DL (ref 31.4–36)
MCV RBC AUTO: 95.8 FL (ref 80–98)
MONOCYTES # BLD AUTO: ABNORMAL 10*3/MM3 (ref 0–0.9)
MONOCYTES NFR BLD AUTO: ABNORMAL % (ref 0–12)
NEUTROPHILS # BLD AUTO: ABNORMAL 10*3/MM3 (ref 2–8.6)
NEUTROPHILS NFR BLD AUTO: ABNORMAL % (ref 37–80)
PLATELET # BLD AUTO: 61 10*3/MM3 (ref 150–450)
PMV BLD AUTO: 10.7 FL (ref 8–12)
RBC # BLD AUTO: 3.8 10*6/MM3 (ref 3.77–5.16)
WBC NRBC COR # BLD: 93.17 10*3/MM3 (ref 3.2–9.8)

## 2017-10-03 PROCEDURE — 85025 COMPLETE CBC W/AUTO DIFF WBC: CPT | Performed by: INTERNAL MEDICINE

## 2017-10-03 PROCEDURE — 99214 OFFICE O/P EST MOD 30 MIN: CPT | Performed by: INTERNAL MEDICINE

## 2017-10-03 PROCEDURE — 36415 COLL VENOUS BLD VENIPUNCTURE: CPT | Performed by: INTERNAL MEDICINE

## 2017-10-03 RX ORDER — ALPRAZOLAM 0.5 MG/1
.5-1 TABLET ORAL NIGHTLY PRN
Qty: 60 TABLET | Refills: 2 | Status: SHIPPED | OUTPATIENT
Start: 2017-10-03 | End: 2018-02-12 | Stop reason: SDUPTHER

## 2017-10-03 NOTE — PROGRESS NOTES
Subjective     Chio Mary is a 62 y.o. female.     History of Present Illness   Chio is here with some issues to address.  Dr. Soriano has informed her that her CLL has progressed to stage III.  Platelets are now under 100,000.  White count continues to gradually increase.  She is experiencing night sweats, but no unexplained weight loss.  He has informed her that she is going to need to start chemotherapy in December.  She is noticing some new lymph nodes increasing in size on the left side of the neck.  On exam today, I note increased bilateral anterior and posterior cervical lymphadenopathy, and also note some apparent lymphadenopathy in the left supraclavicular region.  It is been 1 month since her CBC was checked in his office.  We are going to repeat a CBC today and relate this new information to her oncologist.  We discussed the upcoming chemotherapy.  Her mother was treated for CLL over a course of several years, so she understands the typical treatment course.    She struggles somewhat with depression and generalized anxiety disorder with some sleep difficulties.  He continues to take fluoxetine, although at low dose.  He continues to use Xanax each night and occasionally takes another Xanax during the day she feels anxious or overwhelmed.  She feels that the current dose of these medications is adequate.  She denies any significant side effects.  She has a prescribers agreement in place and reports that she is using her medications compliantly.  Her Ezequiel reported as unremarkable.    Despite the current stress, her blood pressure is still well controlled today.    rReview of Systems   Constitutional: Negative for chills, fatigue, fever and unexpected weight change.   HENT: Negative for congestion, ear pain, postnasal drip, sinus pressure and sore throat.    Respiratory: Negative for cough, shortness of breath and wheezing.    Cardiovascular: Negative for chest pain, palpitations and leg  "swelling.   Gastrointestinal: Negative for abdominal pain, blood in stool, constipation, diarrhea, nausea and vomiting.   Endocrine: Negative for cold intolerance, heat intolerance, polydipsia and polyuria.   Genitourinary: Negative for dysuria, frequency, hematuria and urgency.   Skin: Negative for rash.   Neurological: Negative for syncope and weakness.   Hematological: Negative for adenopathy. Does not bruise/bleed easily.        Night sweats   Psychiatric/Behavioral: Negative for dysphoric mood and suicidal ideas.       Objective     /70  Pulse 80  Temp 98.1 °F (36.7 °C) (Oral)   Ht 64\" (162.6 cm)  Wt 208 lb 12.8 oz (94.7 kg)  BMI 35.84 kg/m2    Physical Exam   Constitutional: She is oriented to person, place, and time. She appears well-developed and well-nourished. No distress.   Very pleasant, obese.    HENT:   Head: Normocephalic and atraumatic.   Eyes: EOM are normal. Pupils are equal, round, and reactive to light.   Neck: Neck supple. No JVD present. No thyromegaly present.   Enlarged lymph nodes are noted in the bilateral anterior and posterior cervical chains, as well as left supraclavicular region at the base of the neck   Cardiovascular: Normal rate, regular rhythm and normal heart sounds.    Pulmonary/Chest: Effort normal and breath sounds normal. No accessory muscle usage. No respiratory distress. She has no wheezes. She has no rales.   Abdominal: Soft. Bowel sounds are normal. She exhibits no distension. There is no tenderness.   Obese abdomen limits exam.    Musculoskeletal: She exhibits no edema.       Vascular Status -  Her exam exhibits right foot vasculature abnormal (Multiple varicosities noted to bilateral lower extremities. ). Her exam exhibits left foot vasculature abnormal.  Lymphadenopathy:     She has cervical adenopathy.   Neurological: She is alert and oriented to person, place, and time. No cranial nerve deficit.   Skin: Skin is warm and dry.   Psychiatric: She has a normal " mood and affect. Her speech is normal and behavior is normal.   Vitals reviewed.      PHQ-2/PHQ-9 Depression Screening 8/8/2017   Little interest or pleasure in doing things 0   Feeling down, depressed, or hopeless 0   Trouble falling or staying asleep, or sleeping too much -   Feeling tired or having little energy -   Poor appetite or overeating -   Feeling bad about yourself - or that you are a failure or have let yourself or your family down -   Trouble concentrating on things, such as reading the newspaper or watching television -   Moving or speaking so slowly that other people could have noticed. Or the opposite - being so fidgety or restless that you have been moving around a lot more than usual -   Thoughts that you would be better off dead, or of hurting yourself in some way -   Total Score 0   If you checked off any problems, how difficult have these problems made it for you to do your work, take care of things at home, or get along with other people? -       Assessment/Plan   Continue Xanax as prescribed.  Continue fluoxetine.  I encouraged her to seek out additional support from her physical family and [facility] family at [facility].    Continue the lisinopril HCT and Coreg.  Pursue sodium restriction.    Check a CBC today.  I will discuss her lymphadenopathy and review CBC results later today with her oncology team in [place].  Keep her follow-up appointments with oncology.      Diagnoses and all orders for this visit:    Generalized anxiety disorder    Chronic lymphatic leukemia  -     CBC Auto Differential    Lymphadenopathy    Thrombocytopenia    Other orders  -     ALPRAZolam (XANAX) 0.5 MG tablet; Take 1-2 tablets by mouth At Night As Needed for Sleep.        No visits with results within 3 Week(s) from this visit.  Latest known visit with results is:    Lab on 08/01/2017   Component Date Value Ref Range Status   • WBC 08/01/2017 85.68* 3.20 - 9.80 10*3/mm3 Final   • RBC 08/01/2017  3.92  3.77 - 5.16 10*6/mm3 Final   • Hemoglobin 08/01/2017 11.6* 12.0 - 15.5 g/dL Final   • Hematocrit 08/01/2017 37.1  35.0 - 45.0 % Final   • MCV 08/01/2017 94.6  80.0 - 98.0 fL Final   • MCH 08/01/2017 29.6  26.5 - 34.0 pg Final   • MCHC 08/01/2017 31.3* 31.4 - 36.0 g/dL Final   • RDW 08/01/2017 16.1* 11.5 - 14.5 % Final   • RDW-SD 08/01/2017 53.0* 36.4 - 46.3 fl Final   • MPV 08/01/2017 10.3  8.0 - 12.0 fL Final   • Platelets 08/01/2017 82* 150 - 450 10*3/mm3 Final   • Glucose 08/01/2017 134* 70 - 100 mg/dL Final   • BUN 08/01/2017 27* 8 - 25 mg/dL Final   • Creatinine 08/01/2017 1.20  0.40 - 1.30 mg/dL Final   • Sodium 08/01/2017 143  134 - 146 mmol/L Final   • Potassium 08/01/2017 4.1  3.4 - 5.4 mmol/L Final   • Chloride 08/01/2017 105  100 - 112 mmol/L Final   • CO2 08/01/2017 26.0  20.0 - 32.0 mmol/L Final   • Calcium 08/01/2017 9.3  8.4 - 10.8 mg/dL Final   • Total Protein 08/01/2017 6.9  6.7 - 8.2 g/dL Final   • Albumin 08/01/2017 4.30  3.20 - 5.50 g/dL Final   • ALT (SGPT) 08/01/2017 16  10 - 60 U/L Final   • AST (SGOT) 08/01/2017 24  10 - 60 U/L Final   • Alkaline Phosphatase 08/01/2017 100  15 - 121 U/L Final   • Total Bilirubin 08/01/2017 2.3* 0.2 - 1.0 mg/dL Final   • eGFR Non African Amer 08/01/2017 45  45 - 104 mL/min/1.73 Final   • Globulin 08/01/2017 2.6  2.5 - 4.6 gm/dL Final   • A/G Ratio 08/01/2017 1.7  1.0 - 3.0 g/dL Final   • BUN/Creatinine Ratio 08/01/2017 22.5  7.0 - 25.0 Final   • Anion Gap 08/01/2017 12.0  5.0 - 15.0 mmol/L Final   • Hemoglobin A1C 08/01/2017 6.7* 4 - 5.6 % Final   • Total Cholesterol 08/01/2017 136* 150 - 200 mg/dL Final   • Triglycerides 08/01/2017 160  35 - 160 mg/dL Final   • HDL Cholesterol 08/01/2017 25* 35 - 100 mg/dL Final   • LDL Cholesterol  08/01/2017 79  mg/dL Final   • VLDL Cholesterol 08/01/2017 32  mg/dL Final   • LDL/HDL Ratio 08/01/2017 3.16   Final   • Vitamin B-12 08/01/2017 699  239 - 931 pg/mL Final   • Ferritin 08/01/2017 26.00  11.10 - 264.00 ng/mL  Final   • TSH 08/01/2017 1.320  0.460 - 4.680 mIU/mL Final   • Magnesium 08/01/2017 1.6* 1.8 - 2.5 mg/dL Final   • Scan Slide 08/01/2017    Final    See Manual Differential Results   • Neutrophil % 08/01/2017 5.0* 37.0 - 80.0 % Final   • Lymphocyte % 08/01/2017 92.0* 10.0 - 50.0 % Final   • Monocyte % 08/01/2017 2.0  0.0 - 12.0 % Final   • Eosinophil % 08/01/2017 1.0  0.0 - 7.0 % Final   • Neutrophils Absolute 08/01/2017 4.28  2.00 - 8.60 10*3/mm3 Final   • Lymphocytes Absolute 08/01/2017 78.83* 0.60 - 4.20 10*3/mm3 Final   • Monocytes Absolute 08/01/2017 1.71* 0.00 - 0.90 10*3/mm3 Final   • Eosinophils Absolute 08/01/2017 0.86* 0.00 - 0.70 10*3/mm3 Final   • Anisocytosis 08/01/2017 Slight/1+  None Seen Final   • WBC Morphology 08/01/2017 Normal  Normal Final   • Platelet Estimate 08/01/2017 Decreased  Normal Final   ]

## 2017-10-04 LAB
DEPRECATED RDW RBC AUTO: 56.8 FL (ref 36.4–46.3)
ERYTHROCYTE [DISTWIDTH] IN BLOOD BY AUTOMATED COUNT: 16.3 % (ref 11.5–14.5)
HCT VFR BLD AUTO: 36.4 % (ref 35–45)
HGB BLD-MCNC: 11 G/DL (ref 12–15.5)
LYMPHOCYTES # BLD MANUAL: 86.01 10*3/MM3 (ref 0.6–4.2)
LYMPHOCYTES NFR BLD MANUAL: 3 % (ref 0–12)
LYMPHOCYTES NFR BLD MANUAL: 92 % (ref 10–50)
MCH RBC QN AUTO: 28.9 PG (ref 26.5–34)
MCHC RBC AUTO-ENTMCNC: 30.1 G/DL (ref 31.4–36)
MCV RBC AUTO: 95.8 FL (ref 80–98)
MONOCYTES # BLD AUTO: 2.8 10*3/MM3 (ref 0–0.9)
NEUTROPHILS # BLD AUTO: 4.67 10*3/MM3 (ref 2–8.6)
NEUTROPHILS NFR BLD MANUAL: 5 % (ref 37–80)
PLAT MORPH BLD: NORMAL
PLATELET # BLD AUTO: 61 10*3/MM3 (ref 150–450)
PMV BLD AUTO: 9.9 FL (ref 8–12)
RBC # BLD AUTO: 3.8 10*6/MM3 (ref 3.77–5.16)
RBC MORPH BLD: NORMAL
WBC MORPH BLD: NORMAL
WBC NRBC COR # BLD: 93.49 10*3/MM3 (ref 3.2–9.8)

## 2018-02-05 ENCOUNTER — LAB (OUTPATIENT)
Dept: LAB | Facility: OTHER | Age: 63
End: 2018-02-05

## 2018-02-05 DIAGNOSIS — E11.9 TYPE 2 DIABETES MELLITUS WITHOUT COMPLICATION, WITHOUT LONG-TERM CURRENT USE OF INSULIN (HCC): ICD-10-CM

## 2018-02-05 DIAGNOSIS — E83.42 HYPOMAGNESEMIA: Chronic | ICD-10-CM

## 2018-02-05 DIAGNOSIS — D50.0 IRON DEFICIENCY ANEMIA DUE TO CHRONIC BLOOD LOSS: ICD-10-CM

## 2018-02-05 LAB
ALBUMIN SERPL-MCNC: 4.2 G/DL (ref 3.2–5.5)
ALBUMIN UR-MCNC: 5.1 MG/L
ALBUMIN/GLOB SERPL: 1.5 G/DL (ref 1–3)
ALP SERPL-CCNC: 72 U/L (ref 15–121)
ALT SERPL W P-5'-P-CCNC: 28 U/L (ref 10–60)
ANION GAP SERPL CALCULATED.3IONS-SCNC: 7 MMOL/L (ref 5–15)
ANISOCYTOSIS BLD QL: ABNORMAL
ARTICHOKE IGE QN: 99 MG/DL (ref 0–129)
AST SERPL-CCNC: 31 U/L (ref 10–60)
BASOPHILS # BLD MANUAL: 0.02 10*3/MM3 (ref 0–0.2)
BASOPHILS NFR BLD AUTO: 1 % (ref 0–2)
BILIRUB SERPL-MCNC: 1.9 MG/DL (ref 0.2–1)
BUN BLD-MCNC: 17 MG/DL (ref 8–25)
BUN/CREAT SERPL: 18.9 (ref 7–25)
CALCIUM SPEC-SCNC: 9.3 MG/DL (ref 8.4–10.8)
CHLORIDE SERPL-SCNC: 99 MMOL/L (ref 100–112)
CO2 SERPL-SCNC: 30 MMOL/L (ref 20–32)
CREAT BLD-MCNC: 0.9 MG/DL (ref 0.4–1.3)
CREAT UR-MCNC: 121.9 MG/DL
DEPRECATED RDW RBC AUTO: 48.3 FL (ref 36.4–46.3)
EOSINOPHIL # BLD MANUAL: 0.05 10*3/MM3 (ref 0–0.7)
EOSINOPHIL NFR BLD MANUAL: 2 % (ref 0–7)
ERYTHROCYTE [DISTWIDTH] IN BLOOD BY AUTOMATED COUNT: 14.9 % (ref 11.5–14.5)
FERRITIN SERPL-MCNC: 27.1 NG/ML (ref 11.1–264)
GFR SERPL CREATININE-BSD FRML MDRD: 63 ML/MIN/1.73 (ref 45–104)
GLOBULIN UR ELPH-MCNC: 2.8 GM/DL (ref 2.5–4.6)
GLUCOSE BLD-MCNC: 129 MG/DL (ref 70–100)
HBA1C MFR BLD: 6.1 % (ref 4–5.6)
HCT VFR BLD AUTO: 38.4 % (ref 35–45)
HGB BLD-MCNC: 12.5 G/DL (ref 12–15.5)
LYMPHOCYTES # BLD MANUAL: 0.65 10*3/MM3 (ref 0.6–4.2)
LYMPHOCYTES NFR BLD MANUAL: 29 % (ref 10–50)
LYMPHOCYTES NFR BLD MANUAL: 9 % (ref 0–12)
MAGNESIUM SERPL-MCNC: 1.7 MG/DL (ref 1.8–2.5)
MCH RBC QN AUTO: 29.6 PG (ref 26.5–34)
MCHC RBC AUTO-ENTMCNC: 32.6 G/DL (ref 31.4–36)
MCV RBC AUTO: 90.8 FL (ref 80–98)
MICROALBUMIN/CREAT UR: 41.8 MG/G (ref 0–30)
MONOCYTES # BLD AUTO: 0.2 10*3/MM3 (ref 0–0.9)
NEUTROPHILS # BLD AUTO: 1.33 10*3/MM3 (ref 2–8.6)
NEUTROPHILS NFR BLD MANUAL: 57 % (ref 37–80)
NEUTS BAND NFR BLD MANUAL: 2 % (ref 0–5)
OVALOCYTES BLD QL SMEAR: ABNORMAL
PLATELET # BLD AUTO: 99 10*3/MM3 (ref 150–450)
PMV BLD AUTO: 9.8 FL (ref 8–12)
POTASSIUM BLD-SCNC: 3.7 MMOL/L (ref 3.4–5.4)
PROT SERPL-MCNC: 7 G/DL (ref 6.7–8.2)
RBC # BLD AUTO: 4.23 10*6/MM3 (ref 3.77–5.16)
SMALL PLATELETS BLD QL SMEAR: ABNORMAL
SODIUM BLD-SCNC: 136 MMOL/L (ref 134–146)
VIT B12 BLD-MCNC: 870 PG/ML (ref 239–931)
WBC MORPH BLD: NORMAL
WBC NRBC COR # BLD: 2.25 10*3/MM3 (ref 3.2–9.8)

## 2018-02-05 PROCEDURE — 83036 HEMOGLOBIN GLYCOSYLATED A1C: CPT | Performed by: INTERNAL MEDICINE

## 2018-02-05 PROCEDURE — 82607 VITAMIN B-12: CPT | Performed by: INTERNAL MEDICINE

## 2018-02-05 PROCEDURE — 82728 ASSAY OF FERRITIN: CPT | Performed by: INTERNAL MEDICINE

## 2018-02-05 PROCEDURE — 83721 ASSAY OF BLOOD LIPOPROTEIN: CPT | Performed by: INTERNAL MEDICINE

## 2018-02-05 PROCEDURE — 80053 COMPREHEN METABOLIC PANEL: CPT | Performed by: INTERNAL MEDICINE

## 2018-02-05 PROCEDURE — 82570 ASSAY OF URINE CREATININE: CPT | Performed by: INTERNAL MEDICINE

## 2018-02-05 PROCEDURE — 85025 COMPLETE CBC W/AUTO DIFF WBC: CPT | Performed by: INTERNAL MEDICINE

## 2018-02-05 PROCEDURE — 36415 COLL VENOUS BLD VENIPUNCTURE: CPT | Performed by: INTERNAL MEDICINE

## 2018-02-05 PROCEDURE — 83735 ASSAY OF MAGNESIUM: CPT | Performed by: INTERNAL MEDICINE

## 2018-02-05 PROCEDURE — 82043 UR ALBUMIN QUANTITATIVE: CPT | Performed by: INTERNAL MEDICINE

## 2018-02-09 NOTE — PROGRESS NOTES
Subjective         History of Present Illness     Chio Mary is a 62 y.o. female who presents for six month follow up on multiple medical issues including diabetes, hypertension, high cholesterol, CLL, vitamin D deficiency, and obesity among other issues.  She has history of right nephrectomy by Dr. Franke in 2003 due to RCCA.  Dr. Soriano in [place] follows her CLL.  Dr. Soriano has informed her that her CLL has progressed to stage III.  She had good results, but prolonged pancytopenia with 2 courses of bendamustine/ritusimab.  She is sensitive to chemotherapy as her mother who had CLL had induced myelosuppression with prolonged chemotherapy.  She has an appointment with Dr. Soriano next week.      She reports adequate control of anxiety on her current combination of Xanax and fluoxetine with no tolerability issues.   She has experienced a few recent stressors, but feels she is coping well..  Her sister had heart surgery and her son was involved in a MVA resulting in muliple injuries, but no life threatening injuries.       Her diabetes is currently excellently controlled.  However, she is reporting frequent daily episodes of postprandial diarrhea even with low dose metformin.   Metformin is the only medication she has tried to manage diabetes.  I recommended she stop the metformin and start Jardiance 10 mg daily. Weight is stable.  We discussed the appetite suppression Jardiance could provide to help her with her weight loss goals.  She denies history of vaginal yeast infections.  I recommended OTC Monistat should she develop symptoms.        DEXA 11/2016 revealed normal bone density.     Blood pressure is a little above goal today.  We decreased her lisinopril HCT to 10/12.5 last fall after blood pressure was below goal.        The patient's relevant past medical, surgical, and social history was reviewed in Epic.   Lab results are reviewed with the patient today.  CBC reveals evidence of  "pancytopenia improved with 2 courses of chemotherapy.  Fasting glucose 129.  A1c 6.1. Renal and liver function normal.  WBCs 2.2.  Platelets 99,000.  LDL is slightly increased at 99.  Magnesium is near normal at 1.7.  Vitamin B-12 at goal.        Review of Systems   Constitutional: Negative for chills, fatigue and fever.   HENT: Negative for congestion, ear pain, postnasal drip, sinus pressure and sore throat.    Respiratory: Negative for cough, shortness of breath and wheezing.    Cardiovascular: Negative for chest pain, palpitations and leg swelling.   Gastrointestinal: Negative for abdominal pain, blood in stool, constipation, diarrhea, nausea and vomiting.   Endocrine: Negative for cold intolerance, heat intolerance, polydipsia and polyuria.   Genitourinary: Negative for dysuria, frequency, hematuria and urgency.   Skin: Negative for rash.   Neurological: Negative for syncope and weakness.        Objective     Vitals:    02/12/18 1110   BP: 150/88   Pulse: 72   Temp: 97.5 °F (36.4 °C)   TempSrc: Oral   Weight: 92.1 kg (203 lb)   Height: 162.6 cm (64\")     Physical Exam   Constitutional: She is oriented to person, place, and time. She appears well-developed and well-nourished. No distress.   Very pleasant, obese   HENT:   Head: Normocephalic and atraumatic.   Nose: Right sinus exhibits no maxillary sinus tenderness and no frontal sinus tenderness. Left sinus exhibits no maxillary sinus tenderness and no frontal sinus tenderness.   Mouth/Throat: Uvula is midline, oropharynx is clear and moist and mucous membranes are normal. No oral lesions. No tonsillar exudate.   Eyes: Conjunctivae and EOM are normal. Pupils are equal, round, and reactive to light.   Neck: Trachea normal. Neck supple. No JVD present. Carotid bruit is not present. No tracheal deviation present. No thyroid mass and no thyromegaly present.   Cardiovascular: Normal rate, regular rhythm, normal heart sounds and intact distal pulses.   No " extrasystoles are present. PMI is not displaced.    No murmur heard.  Pulmonary/Chest: Effort normal and breath sounds normal. No accessory muscle usage. No respiratory distress. She has no decreased breath sounds. She has no wheezes. She has no rhonchi. She has no rales.   Abdominal: Soft. Bowel sounds are normal. She exhibits no distension. There is no hepatosplenomegaly. There is no tenderness.   Overweight abdomen limits exam.        Vascular Status -  Her exam exhibits right foot vasculature normal. Her exam exhibits no right foot edema. Her exam exhibits left foot vasculature normal. Her exam exhibits no left foot edema.  Lymphadenopathy:     She has no cervical adenopathy.   Neurological: She is alert and oriented to person, place, and time. No cranial nerve deficit. Coordination normal.   Skin: Skin is warm, dry and intact. No rash noted. No cyanosis. Nails show no clubbing.   Psychiatric: She has a normal mood and affect. Her speech is normal and behavior is normal. Judgment and thought content normal.   Vitals reviewed.        Assessment/Plan      Continue the Lipitor and dietary efforts.    Continue Xanax and fluoxetine.  A refill is given for Xanax 0.5 mg to take 1-2 tablets by mouth At Night As Needed for Sleep.  I also suggested she continue to try dosing adjustments/reductions regularly.  Patient understands the risks associated with this controlled medication, including tolerance and addiction.  She also agrees to only obtain this medication from me, and not from a another provider, unless that provider is covering for me in my absence.  She also agrees to be compliant in dosing, and not self adjust the dose of medication.  A signed controlled substance agreement is on file, and she has received a controlled substance education sheet at this a previous visit.  She has also signed a consent for treatment with a controlled substance as per [facility] policy. MURALI was obtained.    Stop the  lisinopril HCT and start plain lisinopril 20 mg one tablet daily.   Pursue sodium restriction.  Continue the current potassium supplement.  We will re-evaluate potassium with next set of labs.     Pursue diet, exercise, and weight loss efforts.  I recommended a 2-3 pound monthly weight loss. Stop the metformin due to chronic diarrhea, even on low-dose metformin, and start Jardiance 10 mg one tablet q.a.m.  We discussed how the Jardiance could  help her achieve weight loss goals. Continue monitoring glucose daily.     Continue to follow with Dr. Soriano for CLL.    She will return in six months for follow up with fasting labs one week prior.     Diagnoses and all orders for this visit:    Type 2 diabetes mellitus without complication, without long-term current use of insulin  -     CBC Auto Differential; Future  -     Comprehensive Metabolic Panel; Future  -     Vitamin B12; Future  -     Vitamin D 25 Hydroxy; Future  -     Hemoglobin A1c; Future  -     Lipid Panel; Future  -     Magnesium; Future  -     TSH; Future    Essential hypertension    Mixed hyperlipidemia  -     Lipid Panel; Future    Vitamin D deficiency  -     Vitamin D 25 Hydroxy; Future    Class 1 obesity due to excess calories with serious comorbidity and body mass index (BMI) of 34.0 to 34.9 in adult    Iron deficiency anemia secondary to blood loss (chronic)  -     Vitamin B12; Future    Chronic lymphatic leukemia    Generalized anxiety disorder    Depressive disorder    Hypomagnesemia  -     Magnesium; Future    Sleep disorder    Other orders  -     POTASSIUM CHLORIDE ER PO; Take 100 mEq/day by mouth Daily.  -     ALPRAZolam (XANAX) 0.5 MG tablet; Take 1-2 tablets by mouth At Night As Needed for Sleep.  -     atorvastatin (LIPITOR) 40 MG tablet; Take 1 tablet by mouth Daily.  -     carvedilol (COREG) 6.25 MG tablet; Take 1 tablet by mouth 2 (Two) Times a Day With Meals.  -     FLUoxetine (PROzac) 10 MG capsule; Take 3 capsules by mouth Every  Morning.  -     Cancel: lisinopril-hydrochlorothiazide (PRINZIDE,ZESTORETIC) 10-12.5 MG per tablet; Take 1 tablet by mouth Daily.  -     Cancel: metFORMIN (GLUCOPHAGE) 500 MG tablet; Take one at bedtime  -     spironolactone (ALDACTONE) 25 MG tablet; Take 1 tablet by mouth 2 (Two) Times a Day.  -     lisinopril (PRINIVIL,ZESTRIL) 20 MG tablet; Take 1 tablet by mouth Daily. For blood pressure  -     Empagliflozin (JARDIANCE) 10 MG tablet; Take 10 mg by mouth Every Morning. For diabetes      Lab on 02/05/2018   Component Date Value Ref Range Status   • WBC 02/05/2018 2.25* 3.20 - 9.80 10*3/mm3 Final   • RBC 02/05/2018 4.23  3.77 - 5.16 10*6/mm3 Final   • Hemoglobin 02/05/2018 12.5  12.0 - 15.5 g/dL Final   • Hematocrit 02/05/2018 38.4  35.0 - 45.0 % Final   • MCV 02/05/2018 90.8  80.0 - 98.0 fL Final   • MCH 02/05/2018 29.6  26.5 - 34.0 pg Final   • MCHC 02/05/2018 32.6  31.4 - 36.0 g/dL Final   • RDW 02/05/2018 14.9* 11.5 - 14.5 % Final   • RDW-SD 02/05/2018 48.3* 36.4 - 46.3 fl Final   • MPV 02/05/2018 9.8  8.0 - 12.0 fL Final   • Platelets 02/05/2018 99* 150 - 450 10*3/mm3 Final   • Vitamin B-12 02/05/2018 870  239 - 931 pg/mL Final   • Glucose 02/05/2018 129* 70 - 100 mg/dL Final   • BUN 02/05/2018 17  8 - 25 mg/dL Final   • Creatinine 02/05/2018 0.90  0.40 - 1.30 mg/dL Final   • Sodium 02/05/2018 136  134 - 146 mmol/L Final   • Potassium 02/05/2018 3.7  3.4 - 5.4 mmol/L Final   • Chloride 02/05/2018 99* 100 - 112 mmol/L Final   • CO2 02/05/2018 30.0  20.0 - 32.0 mmol/L Final   • Calcium 02/05/2018 9.3  8.4 - 10.8 mg/dL Final   • Total Protein 02/05/2018 7.0  6.7 - 8.2 g/dL Final   • Albumin 02/05/2018 4.20  3.20 - 5.50 g/dL Final   • ALT (SGPT) 02/05/2018 28  10 - 60 U/L Final   • AST (SGOT) 02/05/2018 31  10 - 60 U/L Final   • Alkaline Phosphatase 02/05/2018 72  15 - 121 U/L Final   • Total Bilirubin 02/05/2018 1.9* 0.2 - 1.0 mg/dL Final   • eGFR Non African Amer 02/05/2018 63  45 - 104 mL/min/1.73 Final   •  Globulin 02/05/2018 2.8  2.5 - 4.6 gm/dL Final   • A/G Ratio 02/05/2018 1.5  1.0 - 3.0 g/dL Final   • BUN/Creatinine Ratio 02/05/2018 18.9  7.0 - 25.0 Final   • Anion Gap 02/05/2018 7.0  5.0 - 15.0 mmol/L Final   • Hemoglobin A1C 02/05/2018 6.1* 4 - 5.6 % Final   • LDL Cholesterol  02/05/2018 99  0 - 129 mg/dL Final   • Microalbumin/Creatinine Ratio 02/05/2018 41.8* 0.0 - 30.0 mg/g Final   • Creatinine, Urine 02/05/2018 121.9  mg/dL Final   • Microalbumin, Urine 02/05/2018 5.1  mg/L Final   • Ferritin 02/05/2018 27.10  11.10 - 264.00 ng/mL Final   • Magnesium 02/05/2018 1.7* 1.8 - 2.5 mg/dL Final   • Neutrophil % 02/05/2018 57.0  37.0 - 80.0 % Final   • Lymphocyte % 02/05/2018 29.0  10.0 - 50.0 % Final   • Monocyte % 02/05/2018 9.0  0.0 - 12.0 % Final   • Eosinophil % 02/05/2018 2.0  0.0 - 7.0 % Final   • Basophil % 02/05/2018 1.0  0.0 - 2.0 % Final   • Bands %  02/05/2018 2.0  0.0 - 5.0 % Final   • Neutrophils Absolute 02/05/2018 1.33* 2.00 - 8.60 10*3/mm3 Final   • Lymphocytes Absolute 02/05/2018 0.65  0.60 - 4.20 10*3/mm3 Final   • Monocytes Absolute 02/05/2018 0.20  0.00 - 0.90 10*3/mm3 Final   • Eosinophils Absolute 02/05/2018 0.05  0.00 - 0.70 10*3/mm3 Final   • Basophils Absolute 02/05/2018 0.02  0.00 - 0.20 10*3/mm3 Final   • Anisocytosis 02/05/2018 Slight/1+  None Seen Final   • Ovalocytes 02/05/2018 Slight/1+  None Seen Final   • WBC Morphology 02/05/2018 Normal  Normal Final   • Platelet Estimate 02/05/2018 Decreased  Normal Final   ]

## 2018-02-09 NOTE — PATIENT INSTRUCTIONS
Exercising to Lose Weight  Introduction  Exercising can help you to lose weight. In order to lose weight through exercise, you need to do vigorous-intensity exercise. You can tell that you are exercising with vigorous intensity if you are breathing very hard and fast and cannot hold a conversation while exercising.  Moderate-intensity exercise helps to maintain your current weight. You can tell that you are exercising at a moderate level if you have a higher heart rate and faster breathing, but you are still able to hold a conversation.  How often should I exercise?  Choose an activity that you enjoy and set realistic goals. Your health care provider can help you to make an activity plan that works for you. Exercise regularly as directed by your health care provider. This may include:  · Doing resistance training twice each week, such as:  ¨ Push-ups.  ¨ Sit-ups.  ¨ Lifting weights.  ¨ Using resistance bands.  · Doing a given intensity of exercise for a given amount of time. Choose from these options:  ¨ 150 minutes of moderate-intensity exercise every week.  ¨ 75 minutes of vigorous-intensity exercise every week.  ¨ A mix of moderate-intensity and vigorous-intensity exercise every week.  Children, pregnant women, people who are out of shape, people who are overweight, and older adults may need to consult a health care provider for individual recommendations. If you have any sort of medical condition, be sure to consult your health care provider before starting a new exercise program.  What are some activities that can help me to lose weight?  · Walking at a rate of at least 4.5 miles an hour.  · Jogging or running at a rate of 5 miles per hour.  · Biking at a rate of at least 10 miles per hour.  · Lap swimming.  · Roller-skating or in-line skating.  · Cross-country skiing.  · Vigorous competitive sports, such as football, basketball, and soccer.  · Jumping rope.  · Aerobic dancing.  How can I be more active in my  day-to-day activities?  · Use the stairs instead of the elevator.  · Take a walk during your lunch break.  · If you drive, park your car farther away from work or school.  · If you take public transportation, get off one stop early and walk the rest of the way.  · Make all of your phone calls while standing up and walking around.  · Get up, stretch, and walk around every 30 minutes throughout the day.  What guidelines should I follow while exercising?  · Do not exercise so much that you hurt yourself, feel dizzy, or get very short of breath.  · Consult your health care provider prior to starting a new exercise program.  · Wear comfortable clothes and shoes with good support.  · Drink plenty of water while you exercise to prevent dehydration or heat stroke. Body water is lost during exercise and must be replaced.  · Work out until you breathe faster and your heart beats faster.  This information is not intended to replace advice given to you by your health care provider. Make sure you discuss any questions you have with your health care provider.  Document Released: 01/20/2012 Document Revised: 05/25/2017 Document Reviewed: 05/21/2015  © 2017 [employer]      Calorie Counting for Weight Loss  Calories are energy you get from the things you eat and drink. Your body uses this energy to keep you going throughout the day. The number of calories you eat affects your weight. When you eat more calories than your body needs, your body stores the extra calories as fat. When you eat fewer calories than your body needs, your body burns fat to get the energy it needs.  Calorie counting means keeping track of how many calories you eat and drink each day. If you make sure to eat fewer calories than your body needs, you should lose weight. In order for calorie counting to work, you will need to eat the number of calories that are right for you in a day to lose a healthy amount of weight per week. A healthy amount of weight to lose per  week is usually 1-2 lb (0.5-0.9 kg). A dietitian can determine how many calories you need in a day and give you suggestions on how to reach your calorie goal.   WHAT IS MY MY PLAN?  My goal is to have __________ calories per day.   If I have this many calories per day, I should lose around __________ pounds per week.  WHAT DO I NEED TO KNOW ABOUT CALORIE COUNTING?  In order to meet your daily calorie goal, you will need to:  · Find out how many calories are in each food you would like to eat. Try to do this before you eat.  · Decide how much of the food you can eat.  · Write down what you ate and how many calories it had. Doing this is called keeping a food log.  WHERE DO I FIND CALORIE INFORMATION?  The number of calories in a food can be found on a Nutrition Facts label. Note that all the information on a label is based on a specific serving of the food. If a food does not have a Nutrition Facts label, try to look up the calories online or ask your dietitian for help.  HOW DO I DECIDE HOW MUCH TO EAT?  To decide how much of the food you can eat, you will need to consider both the number of calories in one serving and the size of one serving. This information can be found on the Nutrition Facts label. If a food does not have a Nutrition Facts label, look up the information online or ask your dietitian for help.  Remember that calories are listed per serving. If you choose to have more than one serving of a food, you will have to multiply the calories per serving by the amount of servings you plan to eat. For example, the label on a package of bread might say that a serving size is 1 slice and that there are 90 calories in a serving. If you eat 1 slice, you will have eaten 90 calories. If you eat 2 slices, you will have eaten 180 calories.  HOW DO I KEEP A FOOD LOG?  After each meal, record the following information in your food log:  · What you ate.  · How much of it you ate.  · How many calories it had.  · Then,  add up your calories.  Keep your food log near you, such as in a small notebook in your pocket. Another option is to use a mobile eloisa or website. Some programs will calculate calories for you and show you how many calories you have left each time you add an item to the log.  WHAT ARE SOME CALORIE COUNTING TIPS?  · Use your calories on foods and drinks that will fill you up and not leave you hungry. Some examples of this include foods like nuts and nut butters, vegetables, lean proteins, and high-fiber foods (more than 5 g fiber per serving).  · Eat nutritious foods and avoid empty calories. Empty calories are calories you get from foods or beverages that do not have many nutrients, such as candy and soda. It is better to have a nutritious high-calorie food (such as an avocado) than a food with few nutrients (such as a bag of chips).  · Know how many calories are in the foods you eat most often. This way, you do not have to look up how many calories they have each time you eat them.  · Look out for foods that may seem like low-calorie foods but are really high-calorie foods, such as baked goods, soda, and fat-free candy.  · Pay attention to calories in drinks. Drinks such as sodas, specialty coffee drinks, alcohol, and juices have a lot of calories yet do not fill you up. Choose low-calorie drinks like water and diet drinks.  · Focus your calorie counting efforts on higher calorie items. Logging the calories in a garden salad that contains only vegetables is less important than calculating the calories in a milk shake.  · Find a way of tracking calories that works for you. Get creative. Most people who are successful find ways to keep track of how much they eat in a day, even if they do not count every calorie.  WHAT ARE SOME PORTION CONTROL TIPS?  · Know how many calories are in a serving. This will help you know how many servings of a certain food you can have.  · Use a measuring cup to measure serving sizes. This  "is helpful when you start out. With time, you will be able to estimate serving sizes for some foods.  · Take some time to put servings of different foods on your favorite plates, bowls, and cups so you know what a serving looks like.  · Try not to eat straight from a bag or box. Doing this can lead to overeating. Put the amount you would like to eat in a cup or on a plate to make sure you are eating the right portion.  · Use smaller plates, glasses, and bowls to prevent overeating. This is a quick and easy way to practice portion control. If your plate is smaller, less food can fit on it.  · Try not to multitask while eating, such as watching TV or using your computer. If it is time to eat, sit down at a table and enjoy your food. Doing this will help you to start recognizing when you are full. It will also make you more aware of what and how much you are eating.  HOW CAN I CALORIE COUNT WHEN EATING OUT?  · Ask for smaller portion sizes or child-sized portions.  · Consider sharing an entree and sides instead of getting your own entree.  · If you get your own entree, eat only half. Ask for a box at the beginning of your meal and put the rest of your entree in it so you are not tempted to eat it.  · Look for the calories on the menu. If calories are listed, choose the lower calorie options.  · Choose dishes that include vegetables, fruits, whole grains, low-fat dairy products, and lean protein. Focusing on smart food choices from each of the 5 food groups can help you stay on track at restaurants.  · Choose items that are boiled, broiled, grilled, or steamed.  · Choose water, milk, unsweetened iced tea, or other drinks without added sugars. If you want an alcoholic beverage, choose a lower calorie option. For example, a regular chapo can have up to 700 calories and a glass of wine has around 150.  · Stay away from items that are buttered, battered, fried, or served with cream sauce. Items labeled \"crispy\" are " usually fried, unless stated otherwise.  · Ask for dressings, sauces, and syrups on the side. These are usually very high in calories, so do not eat much of them.  · Watch out for salads. Many people think salads are a healthy option, but this is often not the case. Many salads come with rodriguez, fried chicken, lots of cheese, fried chips, and dressing. All of these items have a lot of calories. If you want a salad, choose a garden salad and ask for grilled meats or steak. Ask for the dressing on the side, or ask for olive oil and vinegar or lemon to use as dressing.  · Estimate how many servings of a food you are given. For example, a serving of cooked rice is ½ cup or about the size of half a tennis ball or one cupcake wrapper. Knowing serving sizes will help you be aware of how much food you are eating at restaurants. The list below tells you how big or small some common portion sizes are based on everyday objects.  ¨ 1 oz--4 stacked dice.  ¨ 3 oz--1 deck of cards.  ¨ 1 tsp--1 dice.  ¨ 1 Tbsp--½ a Ping-Pong ball.  ¨ 2 Tbsp--1 Ping-Pong ball.  ¨ ½ cup--1 tennis ball or 1 cupcake wrapper.  ¨ 1 cup--1 baseball.  This information is not intended to replace advice given to you by your health care provider. Make sure you discuss any questions you have with your health care provider.  Document Released: 12/18/2006 Document Revised: 01/08/2016 Document Reviewed: 10/23/2014  [employer]vier Interactive Patient Education © 2017 [employer]

## 2018-02-12 ENCOUNTER — OFFICE VISIT (OUTPATIENT)
Dept: FAMILY MEDICINE CLINIC | Facility: CLINIC | Age: 63
End: 2018-02-12

## 2018-02-12 VITALS
TEMPERATURE: 97.5 F | HEIGHT: 64 IN | DIASTOLIC BLOOD PRESSURE: 88 MMHG | BODY MASS INDEX: 34.66 KG/M2 | WEIGHT: 203 LBS | SYSTOLIC BLOOD PRESSURE: 150 MMHG | HEART RATE: 72 BPM

## 2018-02-12 DIAGNOSIS — E11.9 TYPE 2 DIABETES MELLITUS WITHOUT COMPLICATION, WITHOUT LONG-TERM CURRENT USE OF INSULIN (HCC): Primary | Chronic | ICD-10-CM

## 2018-02-12 DIAGNOSIS — C91.10 CHRONIC LYMPHATIC LEUKEMIA (HCC): Chronic | ICD-10-CM

## 2018-02-12 DIAGNOSIS — E78.2 MIXED HYPERLIPIDEMIA: Chronic | ICD-10-CM

## 2018-02-12 DIAGNOSIS — E66.09 CLASS 1 OBESITY DUE TO EXCESS CALORIES WITH SERIOUS COMORBIDITY AND BODY MASS INDEX (BMI) OF 34.0 TO 34.9 IN ADULT: Chronic | ICD-10-CM

## 2018-02-12 DIAGNOSIS — G47.9 SLEEP DISORDER: Chronic | ICD-10-CM

## 2018-02-12 DIAGNOSIS — I10 ESSENTIAL HYPERTENSION: Chronic | ICD-10-CM

## 2018-02-12 DIAGNOSIS — F32.A DEPRESSIVE DISORDER: Chronic | ICD-10-CM

## 2018-02-12 DIAGNOSIS — F41.1 GENERALIZED ANXIETY DISORDER: Chronic | ICD-10-CM

## 2018-02-12 DIAGNOSIS — D50.0 IRON DEFICIENCY ANEMIA DUE TO CHRONIC BLOOD LOSS: Chronic | ICD-10-CM

## 2018-02-12 DIAGNOSIS — E83.42 HYPOMAGNESEMIA: Chronic | ICD-10-CM

## 2018-02-12 DIAGNOSIS — E55.9 VITAMIN D DEFICIENCY: Chronic | ICD-10-CM

## 2018-02-12 PROCEDURE — 99214 OFFICE O/P EST MOD 30 MIN: CPT | Performed by: INTERNAL MEDICINE

## 2018-02-12 RX ORDER — LISINOPRIL 20 MG/1
20 TABLET ORAL DAILY
Qty: 90 TABLET | Refills: 3 | Status: SHIPPED | OUTPATIENT
Start: 2018-02-12 | End: 2019-02-20 | Stop reason: SDUPTHER

## 2018-02-12 RX ORDER — ATORVASTATIN CALCIUM 40 MG/1
40 TABLET, FILM COATED ORAL DAILY
Qty: 90 TABLET | Refills: 3 | Status: SHIPPED | OUTPATIENT
Start: 2018-02-12 | End: 2019-02-20 | Stop reason: SDUPTHER

## 2018-02-12 RX ORDER — SPIRONOLACTONE 25 MG/1
25 TABLET ORAL 2 TIMES DAILY
Qty: 180 TABLET | Refills: 3 | Status: SHIPPED | OUTPATIENT
Start: 2018-02-12 | End: 2019-02-20 | Stop reason: SDUPTHER

## 2018-02-12 RX ORDER — LISINOPRIL AND HYDROCHLOROTHIAZIDE 12.5; 1 MG/1; MG/1
1 TABLET ORAL DAILY
Qty: 90 TABLET | Refills: 3 | Status: CANCELLED | OUTPATIENT
Start: 2018-02-12

## 2018-02-12 RX ORDER — ALPRAZOLAM 0.5 MG/1
.5-1 TABLET ORAL NIGHTLY PRN
Qty: 60 TABLET | Refills: 2 | Status: SHIPPED | OUTPATIENT
Start: 2018-02-12 | End: 2018-08-14 | Stop reason: SDUPTHER

## 2018-02-12 RX ORDER — CARVEDILOL 6.25 MG/1
6.25 TABLET ORAL 2 TIMES DAILY WITH MEALS
Qty: 180 TABLET | Refills: 3 | Status: SHIPPED | OUTPATIENT
Start: 2018-02-12 | End: 2018-11-14

## 2018-02-12 RX ORDER — FLUOXETINE 10 MG/1
30 CAPSULE ORAL EVERY MORNING
Qty: 270 CAPSULE | Refills: 3 | Status: SHIPPED | OUTPATIENT
Start: 2018-02-12 | End: 2019-02-20 | Stop reason: SDUPTHER

## 2018-03-19 ENCOUNTER — OFFICE VISIT (OUTPATIENT)
Dept: GASTROENTEROLOGY | Facility: CLINIC | Age: 63
End: 2018-03-19

## 2018-03-19 VITALS
HEIGHT: 64 IN | DIASTOLIC BLOOD PRESSURE: 86 MMHG | SYSTOLIC BLOOD PRESSURE: 134 MMHG | BODY MASS INDEX: 35.13 KG/M2 | HEART RATE: 80 BPM | WEIGHT: 205.8 LBS

## 2018-03-19 DIAGNOSIS — Z86.010 ENCOUNTER FOR COLONOSCOPY DUE TO HISTORY OF ADENOMATOUS COLONIC POLYPS: Primary | ICD-10-CM

## 2018-03-19 DIAGNOSIS — Z12.11 ENCOUNTER FOR COLONOSCOPY DUE TO HISTORY OF ADENOMATOUS COLONIC POLYPS: Primary | ICD-10-CM

## 2018-03-19 PROCEDURE — S0260 H&P FOR SURGERY: HCPCS | Performed by: NURSE PRACTITIONER

## 2018-03-19 RX ORDER — LORATADINE 10 MG/1
10 TABLET ORAL DAILY
COMMUNITY

## 2018-03-19 RX ORDER — DEXTROSE AND SODIUM CHLORIDE 5; .45 G/100ML; G/100ML
30 INJECTION, SOLUTION INTRAVENOUS CONTINUOUS PRN
Status: CANCELLED | OUTPATIENT
Start: 2018-04-06

## 2018-03-19 RX ORDER — ASCORBIC ACID 500 MG
500 TABLET ORAL DAILY
COMMUNITY

## 2018-03-19 RX ORDER — POLYETHYLENE GLYCOL 3350, SODIUM CHLORIDE, SODIUM BICARBONATE, POTASSIUM CHLORIDE 420; 11.2; 5.72; 1.48 G/4L; G/4L; G/4L; G/4L
4000 POWDER, FOR SOLUTION ORAL ONCE
Qty: 4000 ML | Refills: 0 | Status: SHIPPED | OUTPATIENT
Start: 2018-03-19 | End: 2018-03-19

## 2018-04-06 ENCOUNTER — HOSPITAL ENCOUNTER (OUTPATIENT)
Facility: HOSPITAL | Age: 63
Setting detail: HOSPITAL OUTPATIENT SURGERY
Discharge: HOME | End: 2018-04-06
Attending: SURGERY | Admitting: SURGERY

## 2018-04-06 ENCOUNTER — ANESTHESIA (OUTPATIENT)
Dept: GASTROENTEROLOGY | Facility: HOSPITAL | Age: 63
End: 2018-04-06

## 2018-04-06 ENCOUNTER — ANESTHESIA EVENT (OUTPATIENT)
Dept: GASTROENTEROLOGY | Facility: HOSPITAL | Age: 63
End: 2018-04-06

## 2018-04-06 VITALS
HEIGHT: 64 IN | OXYGEN SATURATION: 94 % | BODY MASS INDEX: 34.15 KG/M2 | TEMPERATURE: 97.1 F | WEIGHT: 200 LBS | DIASTOLIC BLOOD PRESSURE: 58 MMHG | HEART RATE: 69 BPM | SYSTOLIC BLOOD PRESSURE: 108 MMHG | RESPIRATION RATE: 16 BRPM

## 2018-04-06 DIAGNOSIS — Z86.010 ENCOUNTER FOR COLONOSCOPY DUE TO HISTORY OF ADENOMATOUS COLONIC POLYPS: ICD-10-CM

## 2018-04-06 DIAGNOSIS — Z12.11 ENCOUNTER FOR COLONOSCOPY DUE TO HISTORY OF ADENOMATOUS COLONIC POLYPS: ICD-10-CM

## 2018-04-06 LAB — GLUCOSE BLDC GLUCOMTR-MCNC: 105 MG/DL (ref 70–130)

## 2018-04-06 PROCEDURE — 25010000002 FENTANYL CITRATE (PF) 100 MCG/2ML SOLUTION: Performed by: NURSE ANESTHETIST, CERTIFIED REGISTERED

## 2018-04-06 PROCEDURE — 45380 COLONOSCOPY AND BIOPSY: CPT | Performed by: SURGERY

## 2018-04-06 PROCEDURE — 88305 TISSUE EXAM BY PATHOLOGIST: CPT | Performed by: PATHOLOGY

## 2018-04-06 PROCEDURE — 82962 GLUCOSE BLOOD TEST: CPT

## 2018-04-06 PROCEDURE — 88305 TISSUE EXAM BY PATHOLOGIST: CPT | Performed by: SURGERY

## 2018-04-06 PROCEDURE — 45385 COLONOSCOPY W/LESION REMOVAL: CPT | Performed by: SURGERY

## 2018-04-06 PROCEDURE — 25010000002 PROPOFOL 10 MG/ML EMULSION: Performed by: NURSE ANESTHETIST, CERTIFIED REGISTERED

## 2018-04-06 RX ORDER — PROPOFOL 10 MG/ML
VIAL (ML) INTRAVENOUS AS NEEDED
Status: DISCONTINUED | OUTPATIENT
Start: 2018-04-06 | End: 2018-04-06 | Stop reason: SURG

## 2018-04-06 RX ORDER — LIDOCAINE HYDROCHLORIDE 10 MG/ML
INJECTION, SOLUTION INFILTRATION; PERINEURAL AS NEEDED
Status: DISCONTINUED | OUTPATIENT
Start: 2018-04-06 | End: 2018-04-06 | Stop reason: SURG

## 2018-04-06 RX ORDER — DEXTROSE AND SODIUM CHLORIDE 5; .45 G/100ML; G/100ML
20 INJECTION, SOLUTION INTRAVENOUS CONTINUOUS
Status: DISCONTINUED | OUTPATIENT
Start: 2018-04-06 | End: 2018-04-06 | Stop reason: HOSPADM

## 2018-04-06 RX ORDER — FENTANYL CITRATE 50 UG/ML
INJECTION, SOLUTION INTRAMUSCULAR; INTRAVENOUS AS NEEDED
Status: DISCONTINUED | OUTPATIENT
Start: 2018-04-06 | End: 2018-04-06 | Stop reason: SURG

## 2018-04-06 RX ADMIN — FENTANYL CITRATE 50 MCG: 50 INJECTION, SOLUTION INTRAMUSCULAR; INTRAVENOUS at 10:00

## 2018-04-06 RX ADMIN — PROPOFOL 30 MG: 10 INJECTION, EMULSION INTRAVENOUS at 10:05

## 2018-04-06 RX ADMIN — PROPOFOL 20 MG: 10 INJECTION, EMULSION INTRAVENOUS at 10:13

## 2018-04-06 RX ADMIN — DEXTROSE AND SODIUM CHLORIDE 20 ML/HR: 5; 450 INJECTION, SOLUTION INTRAVENOUS at 09:32

## 2018-04-06 RX ADMIN — PROPOFOL 30 MG: 10 INJECTION, EMULSION INTRAVENOUS at 10:11

## 2018-04-06 RX ADMIN — PROPOFOL 30 MG: 10 INJECTION, EMULSION INTRAVENOUS at 10:20

## 2018-04-06 RX ADMIN — PROPOFOL 20 MG: 10 INJECTION, EMULSION INTRAVENOUS at 10:08

## 2018-04-06 RX ADMIN — PROPOFOL 20 MG: 10 INJECTION, EMULSION INTRAVENOUS at 10:00

## 2018-04-06 RX ADMIN — PROPOFOL 20 MG: 10 INJECTION, EMULSION INTRAVENOUS at 10:16

## 2018-04-06 RX ADMIN — PROPOFOL 60 MG: 10 INJECTION, EMULSION INTRAVENOUS at 09:58

## 2018-04-06 RX ADMIN — LIDOCAINE HYDROCHLORIDE 60 MG: 10 INJECTION, SOLUTION INFILTRATION; PERINEURAL at 09:58

## 2018-04-06 RX ADMIN — PROPOFOL 20 MG: 10 INJECTION, EMULSION INTRAVENOUS at 10:03

## 2018-04-06 RX ADMIN — FENTANYL CITRATE 50 MCG: 50 INJECTION, SOLUTION INTRAMUSCULAR; INTRAVENOUS at 09:57

## 2018-04-06 NOTE — H&P (VIEW-ONLY)
Chief Complaint   Patient presents with   • Colonoscopy     recall letter       Subjective    Chio Mary is a 62 y.o. female. she is here today.    History of Present Illness  62-year-old female presents to discuss screening colonoscopy.  History of chronic lymphatic leukemia followed by Dr. Jovanny Turner in [facility].  States her colonoscopy was due last year however she had to have  chemotherapy was unable to complete colonoscopy at that time.  States she was set up to get 8 doses of chemotherapy only had due 2 and is currently in remission.  Her last colonoscopy 2/13/15 noted adenomatous colonic polyp of the sigmoid colon.  Repeat was recommended in 3 years for surveillance.  She denies any abdominal pain, nausea, vomiting or changes in her bowel habits.  She denies any family history of colorectal cancer.  Plan; we'll schedule patient for screening colonoscopy due to history of adenomatous colonic polyp in the sigmoid colon.     The following portions of the patient's history were reviewed and updated as appropriate:   Past Medical History:   Diagnosis Date   • Abnormal liver function     improved   • Blood in feces    • Chronic lymphatic leukemia     Chronic lymphoid leukemia, disease - CLL, Dr. Mccullough was following. Care transferred to Dr. Michaels 11/2014. Care transferred to Dr. Soriano, 11/2015      • Degenerative joint disease involving multiple joints     left foot pain aggravated by daily walking for exercise   • Depressive disorder     anxiety. on fluoxetine   • Epigastric pain    • Essential hypertension     severe. improved with weight loss   • Gastritis    • Generalized anxiety disorder     on xanax at bedtime. on fluoxetine. stopped buspar 2/2016   • H/O bone density study 11/11/2014    x2; 11/11/14 - Persistent osteopenia. Lumbar improved 11%.; 06/2010 - revealed osteopenia   • History of colon polyps     x2 3/2010   • Hypokalemia     on oral potassium and spironolactone   •  Hypomagnesemia     started magox 7/2016   • Iron deficiency anemia secondary to blood loss (chronic)     much improved after iron infusions   • Migraine     h/o   • Obesity    • Osteopenia     next DEXA due 11/2016   • Primary malignant neoplasm of kidney     renal cell carcinoma s/p R nephrectomy 4/2003   • Primary malignant neoplasm of right kidney - R nephrectomy 2003     renal cell carcinoma s/p R nephrectomy 4/2003   • Prolapsed thoracic intervertebral disc     Prolapsed intervertebral disc without myelopathy - T8, T9      • Pure hypercholesterolemia     changed zocor and zetia to lipitor 11/2014   • Sleep disorder     on xanax   • Tobacco use     tobacco use and exposure - quit 2002   • Tubular adenoma     h/o 2010   • Type 2 diabetes mellitus     on low-dose metformin. higher dose metformin produced diarrhea   • Vitamin D deficiency     on oral vit D     Past Surgical History:   Procedure Laterality Date   • BACK SURGERY      low back disk surgery   • CHOLECYSTECTOMY     • COLONOSCOPY  03/22/2010    Two polyps were found in the cecum and ascending colon. Internal and external hemorrhoids were found.    • COLONOSCOPY W/ POLYPECTOMY  02/13/2015    1 polyp in sigmoid colon; removed by snare cautery polypectomy. Internal & external hemorrhoids found.   • ENDOSCOPY W/ PEG TUBE PLACEMENT  02/13/2015    Normal esophagus. Gastritis in stomach. Biopsy taken. Normal duodenum. Biopsy taken.   • HYSTERECTOMY     • INGUINAL HERNIA REPAIR LAPAROSCOPIC AND UMBILICAL HERNIA REPAIR Right 01/2004    R inguinal, umbilical hernia repair   • INJECTION OF MEDICATION  06/23/2014    depo medrol 80mg   • INJECTION OF MEDICATION  03/24/2015    IV Infusion, Med    • INJECTION OF MEDICATION  06/23/2014    rocephin   • NEPHRECTOMY Right 2003    s/p R nephrectomy 2003 Dr. Franke secondary to RCCA   • OTHER SURGICAL HISTORY  03/18/2013    Inj(s) Tend-Sheath, Ligament, Single [id number]    • OTHER SURGICAL HISTORY  10/23/2013    Small Joint  Injection/Aspiration     • TOTAL HIP ARTHROPLASTY      s/p R 2009, s/p L 2009   • VARICOSE VEIN SURGERY      phleb veins - extrem (to 20) - Varicose veins, left lower extremity. Stripping and ligation fo saphenous varicose veins, left thigh and left lower lip.     Family History   Problem Relation Age of Onset   • Pancreatic cancer Mother    • Colon cancer Other      strong family h/o   • Cancer Other    • Thyroid disease Other    • Breast cancer Sister      OB History      Para Term  AB Living    2 2 2       SAB TAB Ectopic Molar Multiple Live Births                 Current Outpatient Prescriptions   Medication Sig Dispense Refill   • ALPRAZolam (XANAX) 0.5 MG tablet Take 1-2 tablets by mouth At Night As Needed for Sleep. 60 tablet 2   • aspirin 81 MG EC tablet Take 81 mg by mouth Daily.     • atorvastatin (LIPITOR) 40 MG tablet Take 1 tablet by mouth Daily. 90 tablet 3   • carvedilol (COREG) 6.25 MG tablet Take 1 tablet by mouth 2 (Two) Times a Day With Meals. 180 tablet 3   • cholecalciferol (VITAMIN D3) 1000 UNITS tablet Take 1,000 Units by mouth Daily.     • Empagliflozin (JARDIANCE) 10 MG tablet Take 10 mg by mouth Every Morning. For diabetes 90 tablet 3   • FLUoxetine (PROzac) 10 MG capsule Take 3 capsules by mouth Every Morning. 270 capsule 3   • lisinopril (PRINIVIL,ZESTRIL) 20 MG tablet Take 1 tablet by mouth Daily. For blood pressure 90 tablet 3   • loratadine (CLARITIN) 10 MG tablet Take 10 mg by mouth Daily.     • omeprazole (priLOSEC) 40 MG capsule Take 20 mg by mouth Every Morning.     • POTASSIUM CHLORIDE ER PO Take 100 mEq/day by mouth Daily.     • spironolactone (ALDACTONE) 25 MG tablet Take 1 tablet by mouth 2 (Two) Times a Day. 180 tablet 3   • vitamin B-12 (CYANOCOBALAMIN) 500 MCG tablet Take 2,500 mcg by mouth Every Other Day.     • vitamin C (ASCORBIC ACID) 500 MG tablet Take 500 mg by mouth Daily.       No current facility-administered medications for this visit.     "    Allergies   Allergen Reactions   • Nsaids      Due to only one kidney     Social History     Social History   • Marital status:      Social History Main Topics   • Smoking status: Former Smoker   • Smokeless tobacco: Never Used   • Alcohol use No   • Drug use: No   • Sexual activity: Defer     Other Topics Concern   • Not on file       Review of Systems  Review of Systems   Constitutional: Negative for activity change, appetite change, chills, diaphoresis, fatigue, fever and unexpected weight change.   HENT: Negative for sore throat and trouble swallowing.    Respiratory: Negative for shortness of breath.    Gastrointestinal: Negative for abdominal distention, abdominal pain, anal bleeding, blood in stool, constipation, diarrhea, nausea, rectal pain and vomiting.   Musculoskeletal: Negative for arthralgias.   Skin: Negative for pallor.   Neurological: Negative for light-headedness.        /86   Pulse 80   Ht 162.6 cm (64.02\")   Wt 93.4 kg (205 lb 12.8 oz)   BMI 35.31 kg/m²     Objective    Physical Exam   Constitutional: She is oriented to person, place, and time. She appears well-developed and well-nourished. She is cooperative. No distress.   HENT:   Head: Normocephalic and atraumatic.   Neck: Normal range of motion. Neck supple. No thyromegaly present.   Cardiovascular: Normal rate, regular rhythm and normal heart sounds.    Pulmonary/Chest: Effort normal and breath sounds normal. She has no wheezes. She has no rhonchi. She has no rales.   Abdominal: Soft. Normal appearance and bowel sounds are normal. She exhibits no shifting dullness and no distension. There is no hepatosplenomegaly. There is no tenderness. There is no rigidity and no guarding. No hernia.   Lymphadenopathy:     She has no cervical adenopathy.   Neurological: She is alert and oriented to person, place, and time.   Skin: Skin is warm, dry and intact. No rash noted. No pallor.   Psychiatric: She has a normal mood and affect. " Her speech is normal.     Lab on 02/05/2018   Component Date Value Ref Range Status   • WBC 02/05/2018 2.25* 3.20 - 9.80 10*3/mm3 Final   • RBC 02/05/2018 4.23  3.77 - 5.16 10*6/mm3 Final   • Hemoglobin 02/05/2018 12.5  12.0 - 15.5 g/dL Final   • Hematocrit 02/05/2018 38.4  35.0 - 45.0 % Final   • MCV 02/05/2018 90.8  80.0 - 98.0 fL Final   • MCH 02/05/2018 29.6  26.5 - 34.0 pg Final   • MCHC 02/05/2018 32.6  31.4 - 36.0 g/dL Final   • RDW 02/05/2018 14.9* 11.5 - 14.5 % Final   • RDW-SD 02/05/2018 48.3* 36.4 - 46.3 fl Final   • MPV 02/05/2018 9.8  8.0 - 12.0 fL Final   • Platelets 02/05/2018 99* 150 - 450 10*3/mm3 Final   • Vitamin B-12 02/05/2018 870  239 - 931 pg/mL Final   • Glucose 02/05/2018 129* 70 - 100 mg/dL Final   • BUN 02/05/2018 17  8 - 25 mg/dL Final   • Creatinine 02/05/2018 0.90  0.40 - 1.30 mg/dL Final   • Sodium 02/05/2018 136  134 - 146 mmol/L Final   • Potassium 02/05/2018 3.7  3.4 - 5.4 mmol/L Final   • Chloride 02/05/2018 99* 100 - 112 mmol/L Final   • CO2 02/05/2018 30.0  20.0 - 32.0 mmol/L Final   • Calcium 02/05/2018 9.3  8.4 - 10.8 mg/dL Final   • Total Protein 02/05/2018 7.0  6.7 - 8.2 g/dL Final   • Albumin 02/05/2018 4.20  3.20 - 5.50 g/dL Final   • ALT (SGPT) 02/05/2018 28  10 - 60 U/L Final   • AST (SGOT) 02/05/2018 31  10 - 60 U/L Final   • Alkaline Phosphatase 02/05/2018 72  15 - 121 U/L Final   • Total Bilirubin 02/05/2018 1.9* 0.2 - 1.0 mg/dL Final   • eGFR Non African Amer 02/05/2018 63  45 - 104 mL/min/1.73 Final   • Globulin 02/05/2018 2.8  2.5 - 4.6 gm/dL Final   • A/G Ratio 02/05/2018 1.5  1.0 - 3.0 g/dL Final   • BUN/Creatinine Ratio 02/05/2018 18.9  7.0 - 25.0 Final   • Anion Gap 02/05/2018 7.0  5.0 - 15.0 mmol/L Final   • Hemoglobin A1C 02/05/2018 6.1* 4 - 5.6 % Final   • LDL Cholesterol  02/05/2018 99  0 - 129 mg/dL Final   • Microalbumin/Creatinine Ratio 02/05/2018 41.8* 0.0 - 30.0 mg/g Final   • Creatinine, Urine 02/05/2018 121.9  mg/dL Final   • Microalbumin, Urine  02/05/2018 5.1  mg/L Final   • Ferritin 02/05/2018 27.10  11.10 - 264.00 ng/mL Final   • Magnesium 02/05/2018 1.7* 1.8 - 2.5 mg/dL Final   • Neutrophil % 02/05/2018 57.0  37.0 - 80.0 % Final   • Lymphocyte % 02/05/2018 29.0  10.0 - 50.0 % Final   • Monocyte % 02/05/2018 9.0  0.0 - 12.0 % Final   • Eosinophil % 02/05/2018 2.0  0.0 - 7.0 % Final   • Basophil % 02/05/2018 1.0  0.0 - 2.0 % Final   • Bands %  02/05/2018 2.0  0.0 - 5.0 % Final   • Neutrophils Absolute 02/05/2018 1.33* 2.00 - 8.60 10*3/mm3 Final   • Lymphocytes Absolute 02/05/2018 0.65  0.60 - 4.20 10*3/mm3 Final   • Monocytes Absolute 02/05/2018 0.20  0.00 - 0.90 10*3/mm3 Final   • Eosinophils Absolute 02/05/2018 0.05  0.00 - 0.70 10*3/mm3 Final   • Basophils Absolute 02/05/2018 0.02  0.00 - 0.20 10*3/mm3 Final   • Anisocytosis 02/05/2018 Slight/1+  None Seen Final   • Ovalocytes 02/05/2018 Slight/1+  None Seen Final   • WBC Morphology 02/05/2018 Normal  Normal Final   • Platelet Estimate 02/05/2018 Decreased  Normal Final     Assessment/Plan      1. Encounter for colonoscopy due to history of adenomatous colonic polyps    .       Orders placed during this encounter include:      COLONOSCOPY (N/A)    Review and/or summary of lab tests, radiology, procedures, medications. Review and summary of old records and obtaining of history. The risks and benefits of my recommendations, as well as other treatment options were discussed with the patient today. Questions were answered.    No orders of the defined types were placed in this encounter.      Follow-up: Return if symptoms worsen or fail to improve.          This document has been electronically signed by ISMAEL Leone on March 19, 2018 10:10 AM             Results for orders placed or performed in visit on 02/05/18   CBC Auto Differential   Result Value Ref Range    WBC 2.25 (L) 3.20 - 9.80 10*3/mm3    RBC 4.23 3.77 - 5.16 10*6/mm3    Hemoglobin 12.5 12.0 - 15.5 g/dL    Hematocrit 38.4 35.0 - 45.0 %     MCV 90.8 80.0 - 98.0 fL    MCH 29.6 26.5 - 34.0 pg    MCHC 32.6 31.4 - 36.0 g/dL    RDW 14.9 (H) 11.5 - 14.5 %    RDW-SD 48.3 (H) 36.4 - 46.3 fl    MPV 9.8 8.0 - 12.0 fL    Platelets 99 (L) 150 - 450 10*3/mm3   Microalbumin / Creatinine Urine Ratio   Result Value Ref Range    Microalbumin/Creatinine Ratio 41.8 (H) 0.0 - 30.0 mg/g    Creatinine, Urine 121.9 mg/dL    Microalbumin, Urine 5.1 mg/L   Manual Differential   Result Value Ref Range    Neutrophil % 57.0 37.0 - 80.0 %    Lymphocyte % 29.0 10.0 - 50.0 %    Monocyte % 9.0 0.0 - 12.0 %    Eosinophil % 2.0 0.0 - 7.0 %    Basophil % 1.0 0.0 - 2.0 %    Bands %  2.0 0.0 - 5.0 %    Neutrophils Absolute 1.33 (L) 2.00 - 8.60 10*3/mm3    Lymphocytes Absolute 0.65 0.60 - 4.20 10*3/mm3    Monocytes Absolute 0.20 0.00 - 0.90 10*3/mm3    Eosinophils Absolute 0.05 0.00 - 0.70 10*3/mm3    Basophils Absolute 0.02 0.00 - 0.20 10*3/mm3    Anisocytosis Slight/1+ None Seen    Ovalocytes Slight/1+ None Seen    WBC Morphology Normal Normal    Platelet Estimate Decreased Normal   Magnesium   Result Value Ref Range    Magnesium 1.7 (L) 1.8 - 2.5 mg/dL   LDL Cholesterol, Direct   Result Value Ref Range    LDL Cholesterol  99 0 - 129 mg/dL   Hemoglobin A1c   Result Value Ref Range    Hemoglobin A1C 6.1 (H) 4 - 5.6 %   Ferritin   Result Value Ref Range    Ferritin 27.10 11.10 - 264.00 ng/mL   Vitamin B12   Result Value Ref Range    Vitamin B-12 870 239 - 931 pg/mL   Comprehensive Metabolic Panel   Result Value Ref Range    Glucose 129 (H) 70 - 100 mg/dL    BUN 17 8 - 25 mg/dL    Creatinine 0.90 0.40 - 1.30 mg/dL    Sodium 136 134 - 146 mmol/L    Potassium 3.7 3.4 - 5.4 mmol/L    Chloride 99 (L) 100 - 112 mmol/L    CO2 30.0 20.0 - 32.0 mmol/L    Calcium 9.3 8.4 - 10.8 mg/dL    Total Protein 7.0 6.7 - 8.2 g/dL    Albumin 4.20 3.20 - 5.50 g/dL    ALT (SGPT) 28 10 - 60 U/L    AST (SGOT) 31 10 - 60 U/L    Alkaline Phosphatase 72 15 - 121 U/L    Total Bilirubin 1.9 (H) 0.2 - 1.0 mg/dL    eGFR  Non  Amer 63 45 - 104 mL/min/1.73    Globulin 2.8 2.5 - 4.6 gm/dL    A/G Ratio 1.5 1.0 - 3.0 g/dL    BUN/Creatinine Ratio 18.9 7.0 - 25.0    Anion Gap 7.0 5.0 - 15.0 mmol/L   Results for orders placed or performed in visit on 10/03/17   CBC Auto Differential   Result Value Ref Range    WBC 93.17 (H) 3.20 - 9.80 10*3/mm3    RBC 3.80 3.77 - 5.16 10*6/mm3    Hemoglobin 11.0 (L) 12.0 - 15.5 g/dL    Hematocrit 36.4 35.0 - 45.0 %    MCV 95.8 80.0 - 98.0 fL    MCH 28.9 26.5 - 34.0 pg    MCHC 30.2 (L) 31.4 - 36.0 g/dL    RDW 16.4 (H) 11.5 - 14.5 %    RDW-SD 56.8 (H) 36.4 - 46.3 fl    MPV 10.7 8.0 - 12.0 fL    Platelets 61 (L) 150 - 450 10*3/mm3    Neutrophil %  37.0 - 80.0 %    Lymphocyte %  10.0 - 50.0 %    Monocyte %  0.0 - 12.0 %    Eosinophil % 0.1 0.0 - 7.0 %    Basophil % 0.2 0.0 - 2.0 %    Immature Grans %  0.0 - 0.5 %    Neutrophils, Absolute  2.00 - 8.60 10*3/mm3    Lymphocytes, Absolute  0.60 - 4.20 10*3/mm3    Monocytes, Absolute  0.00 - 0.90 10*3/mm3    Eosinophils, Absolute 0.13 0.00 - 0.70 10*3/mm3    Basophils, Absolute 0.21 (H) 0.00 - 0.20 10*3/mm3    Immature Grans, Absolute  0.00 - 0.02 10*3/mm3   CBC Auto Differential   Result Value Ref Range    WBC 93.49 (C) 3.20 - 9.80 10*3/mm3    RBC 3.80 3.77 - 5.16 10*6/mm3    Hemoglobin 11.0 (L) 12.0 - 15.5 g/dL    Hematocrit 36.4 35.0 - 45.0 %    MCV 95.8 80.0 - 98.0 fL    MCH 28.9 26.5 - 34.0 pg    MCHC 30.1 (L) 31.4 - 36.0 g/dL    RDW 16.3 (H) 11.5 - 14.5 %    RDW-SD 56.8 (H) 36.4 - 46.3 fl    MPV 9.9 8.0 - 12.0 fL    Platelets 61 (L) 150 - 450 10*3/mm3   Manual Differential   Result Value Ref Range    Neutrophil % 5.0 (L) 37.0 - 80.0 %    Lymphocyte % 92.0 (H) 10.0 - 50.0 %    Monocyte % 3.0 0.0 - 12.0 %    Neutrophils Absolute 4.67 2.00 - 8.60 10*3/mm3    Lymphocytes Absolute 86.01 (H) 0.60 - 4.20 10*3/mm3    Monocytes Absolute 2.80 (H) 0.00 - 0.90 10*3/mm3    RBC Morphology Normal Normal    WBC Morphology Normal Normal    Platelet Morphology Normal  Normal   Results for orders placed or performed in visit on 08/01/17   Scan Slide   Result Value Ref Range    Scan Slide     CBC Auto Differential   Result Value Ref Range    WBC 85.68 (C) 3.20 - 9.80 10*3/mm3    RBC 3.92 3.77 - 5.16 10*6/mm3    Hemoglobin 11.6 (L) 12.0 - 15.5 g/dL    Hematocrit 37.1 35.0 - 45.0 %    MCV 94.6 80.0 - 98.0 fL    MCH 29.6 26.5 - 34.0 pg    MCHC 31.3 (L) 31.4 - 36.0 g/dL    RDW 16.1 (H) 11.5 - 14.5 %    RDW-SD 53.0 (H) 36.4 - 46.3 fl    MPV 10.3 8.0 - 12.0 fL    Platelets 82 (L) 150 - 450 10*3/mm3     *Note: Due to a large number of results and/or encounters for the requested time period, some results have not been displayed. A complete set of results can be found in Results Review.

## 2018-04-06 NOTE — INTERVAL H&P NOTE
Fully discussed cscope with patient and risks and benefits and wishes to proceed.    H&P reviewed. The patient was examined and there are no changes to the H&P.

## 2018-04-06 NOTE — ANESTHESIA POSTPROCEDURE EVALUATION
Patient: Chio Mary    Procedure Summary     Date:  04/06/18 Room / Location:  [facility] ENDOSCOPY 2 / [facility] ENDOSCOPY    Anesthesia Start:  0956 Anesthesia Stop:  1025    Procedure:  COLONOSCOPY (N/A ) Diagnosis:       Encounter for colonoscopy due to history of adenomatous colonic polyps      (Encounter for colonoscopy due to history of adenomatous colonic polyps [Z12.11, Z86.010])    Surgeon:  Lm Gutierrez MD Provider:  Munir Tomas CRNA    Anesthesia Type:  MAC ASA Status:  3          Anesthesia Type: MAC  Last vitals  BP   139/97 (04/06/18 0920)   Temp   97.3 °F (36.3 °C) (04/06/18 0920)   Pulse   76 (04/06/18 0920)   Resp   18 (04/06/18 0920)     SpO2   98 % (04/06/18 0920)     Post Anesthesia Care and Evaluation    Patient location during evaluation: bedside  Patient participation: complete - patient participated  Level of consciousness: awake and awake and alert  Pain score: 0  Pain management: satisfactory to patient  Airway patency: patent  Anesthetic complications: No anesthetic complications  PONV Status: none  Cardiovascular status: acceptable and stable  Respiratory status: acceptable, room air, unassisted and spontaneous ventilation  Hydration status: acceptable

## 2018-04-06 NOTE — ANESTHESIA PREPROCEDURE EVALUATION
Anesthesia Evaluation     Patient summary reviewed and Nursing notes reviewed   NPO Solid Status: > 8 hours  NPO Liquid Status: > 4 hours           Airway   Mallampati: II  TM distance: >3 FB  Neck ROM: full  No difficulty expected  Dental - normal exam     Pulmonary - normal exam   (+) a smoker Former,   Cardiovascular - normal exam    (+) hypertension, hyperlipidemia,       Neuro/Psych  (+) headaches, psychiatric history Anxiety and Depression,     GI/Hepatic/Renal/Endo    (+) obesity,  GI bleeding, diabetes mellitus type 2,     Musculoskeletal     (+) back pain,       ROS comment: DJD  Abdominal  - normal exam   Substance History - negative use     OB/GYN negative ob/gyn ROS         Other   (+) blood dyscrasia, arthritis   history of cancer                    Anesthesia Plan    ASA 3     MAC     intravenous induction   Anesthetic plan and risks discussed with patient.

## 2018-04-09 LAB
LAB AP CASE REPORT: NORMAL
Lab: NORMAL
PATH REPORT.FINAL DX SPEC: NORMAL
PATH REPORT.GROSS SPEC: NORMAL

## 2018-04-27 ENCOUNTER — OFFICE VISIT (OUTPATIENT)
Dept: SURGERY | Facility: CLINIC | Age: 63
End: 2018-04-27

## 2018-04-27 VITALS
HEIGHT: 64 IN | BODY MASS INDEX: 35.17 KG/M2 | DIASTOLIC BLOOD PRESSURE: 82 MMHG | SYSTOLIC BLOOD PRESSURE: 126 MMHG | WEIGHT: 206 LBS

## 2018-04-27 DIAGNOSIS — K57.30 DIVERTICULOSIS OF LARGE INTESTINE WITHOUT HEMORRHAGE: ICD-10-CM

## 2018-04-27 DIAGNOSIS — Z86.010 HISTORY OF COLON POLYPS: Primary | ICD-10-CM

## 2018-04-27 PROCEDURE — 99212 OFFICE O/P EST SF 10 MIN: CPT | Performed by: SURGERY

## 2018-04-27 NOTE — PROGRESS NOTES
62-year-old female here for follow-up after recent colonoscopy.  Up was adequate to identify 6 mm polyps.  But is significant for diverticulosis and she also had a small tubular adenomatous polyp removed from her cecum she also had 2 submucosal lipomas good biopsies of the overlying mucosa was unremarkable.  I went over these findings with her.  She did well with the procedure.  Would recommend repeat colonoscopy in 3 years or sooner she has any other concerns or questions.  We discussed fiber and its importance is supplement.

## 2018-04-27 NOTE — PATIENT INSTRUCTIONS

## 2018-05-07 ENCOUNTER — LAB (OUTPATIENT)
Dept: LAB | Facility: OTHER | Age: 63
End: 2018-05-07

## 2018-05-07 ENCOUNTER — TRANSCRIBE ORDERS (OUTPATIENT)
Dept: GENERAL RADIOLOGY | Facility: CLINIC | Age: 63
End: 2018-05-07

## 2018-05-07 DIAGNOSIS — Z79.899 LONG TERM USE OF DRUG: Primary | ICD-10-CM

## 2018-05-07 DIAGNOSIS — Z79.899 LONG TERM USE OF DRUG: ICD-10-CM

## 2018-05-07 LAB
ANION GAP SERPL CALCULATED.3IONS-SCNC: 7 MMOL/L (ref 5–15)
BUN BLD-MCNC: 18 MG/DL (ref 8–25)
BUN/CREAT SERPL: 18 (ref 7–25)
CALCIUM SPEC-SCNC: 9.3 MG/DL (ref 8.4–10.8)
CHLORIDE SERPL-SCNC: 103 MMOL/L (ref 100–112)
CO2 SERPL-SCNC: 29 MMOL/L (ref 20–32)
CREAT BLD-MCNC: 1 MG/DL (ref 0.4–1.3)
GFR SERPL CREATININE-BSD FRML MDRD: 56 ML/MIN/1.73 (ref 45–104)
GLUCOSE BLD-MCNC: 102 MG/DL (ref 70–100)
POTASSIUM BLD-SCNC: 3.8 MMOL/L (ref 3.4–5.4)
SODIUM BLD-SCNC: 139 MMOL/L (ref 134–146)

## 2018-05-07 PROCEDURE — 80048 BASIC METABOLIC PNL TOTAL CA: CPT | Performed by: INTERNAL MEDICINE

## 2018-05-07 PROCEDURE — 36415 COLL VENOUS BLD VENIPUNCTURE: CPT | Performed by: INTERNAL MEDICINE

## 2018-06-11 DIAGNOSIS — Z12.31 ENCOUNTER FOR SCREENING MAMMOGRAM FOR MALIGNANT NEOPLASM OF BREAST: Primary | ICD-10-CM

## 2018-08-07 ENCOUNTER — LAB (OUTPATIENT)
Dept: LAB | Facility: OTHER | Age: 63
End: 2018-08-07

## 2018-08-07 DIAGNOSIS — E11.9 TYPE 2 DIABETES MELLITUS WITHOUT COMPLICATION, WITHOUT LONG-TERM CURRENT USE OF INSULIN (HCC): Chronic | ICD-10-CM

## 2018-08-07 DIAGNOSIS — E55.9 VITAMIN D DEFICIENCY: Chronic | ICD-10-CM

## 2018-08-07 DIAGNOSIS — E83.42 HYPOMAGNESEMIA: Chronic | ICD-10-CM

## 2018-08-07 DIAGNOSIS — E78.2 MIXED HYPERLIPIDEMIA: Chronic | ICD-10-CM

## 2018-08-07 DIAGNOSIS — D50.0 IRON DEFICIENCY ANEMIA DUE TO CHRONIC BLOOD LOSS: Chronic | ICD-10-CM

## 2018-08-07 LAB
25(OH)D3 SERPL-MCNC: 51.9 NG/ML (ref 30–100)
ALBUMIN SERPL-MCNC: 4.3 G/DL (ref 3.5–5)
ALBUMIN/GLOB SERPL: 1.4 G/DL (ref 1.1–1.8)
ALP SERPL-CCNC: 105 U/L (ref 38–126)
ALT SERPL W P-5'-P-CCNC: 18 U/L
ANION GAP SERPL CALCULATED.3IONS-SCNC: 7 MMOL/L (ref 5–15)
AST SERPL-CCNC: 21 U/L (ref 14–36)
BASOPHILS # BLD AUTO: 0.03 10*3/MM3 (ref 0–0.2)
BASOPHILS NFR BLD AUTO: 0.7 % (ref 0–2)
BILIRUB SERPL-MCNC: 1.7 MG/DL (ref 0.2–1.3)
BUN BLD-MCNC: 16 MG/DL (ref 7–17)
BUN/CREAT SERPL: 15.4 (ref 7–25)
CALCIUM SPEC-SCNC: 9.3 MG/DL (ref 8.4–10.2)
CHLORIDE SERPL-SCNC: 109 MMOL/L (ref 98–107)
CHOLEST SERPL-MCNC: 144 MG/DL (ref 150–200)
CO2 SERPL-SCNC: 29 MMOL/L (ref 22–30)
CREAT BLD-MCNC: 1.04 MG/DL (ref 0.52–1.04)
DEPRECATED RDW RBC AUTO: 49.8 FL (ref 36.4–46.3)
EOSINOPHIL # BLD AUTO: 0.09 10*3/MM3 (ref 0–0.7)
EOSINOPHIL NFR BLD AUTO: 2.2 % (ref 0–7)
ERYTHROCYTE [DISTWIDTH] IN BLOOD BY AUTOMATED COUNT: 15.2 % (ref 11.5–14.5)
GFR SERPL CREATININE-BSD FRML MDRD: 53 ML/MIN/1.73 (ref 45–104)
GLOBULIN UR ELPH-MCNC: 3 GM/DL (ref 2.3–3.5)
GLUCOSE BLD-MCNC: 115 MG/DL (ref 74–99)
HBA1C MFR BLD: 6.4 % (ref 4–5.6)
HCT VFR BLD AUTO: 42.4 % (ref 35–45)
HDLC SERPL-MCNC: 35 MG/DL (ref 40–59)
HGB BLD-MCNC: 13.7 G/DL (ref 12–15.5)
LDLC SERPL CALC-MCNC: 73 MG/DL
LDLC/HDLC SERPL: 2.1 {RATIO} (ref 0–3.22)
LYMPHOCYTES # BLD AUTO: 1.96 10*3/MM3 (ref 0.6–4.2)
LYMPHOCYTES NFR BLD AUTO: 48.4 % (ref 10–50)
MAGNESIUM SERPL-MCNC: 1.7 MG/DL (ref 1.6–2.3)
MCH RBC QN AUTO: 30 PG (ref 26.5–34)
MCHC RBC AUTO-ENTMCNC: 32.3 G/DL (ref 31.4–36)
MCV RBC AUTO: 93 FL (ref 80–98)
MONOCYTES # BLD AUTO: 0.29 10*3/MM3 (ref 0–0.9)
MONOCYTES NFR BLD AUTO: 7.2 % (ref 0–12)
NEUTROPHILS # BLD AUTO: 1.68 10*3/MM3 (ref 2–8.6)
NEUTROPHILS NFR BLD AUTO: 41.5 % (ref 37–80)
PLATELET # BLD AUTO: 119 10*3/MM3 (ref 150–450)
PMV BLD AUTO: 10.1 FL (ref 8–12)
POTASSIUM BLD-SCNC: 4.1 MMOL/L (ref 3.4–5)
PROT SERPL-MCNC: 7.3 G/DL (ref 6.3–8.2)
RBC # BLD AUTO: 4.56 10*6/MM3 (ref 3.77–5.16)
SODIUM BLD-SCNC: 145 MMOL/L (ref 137–145)
TRIGL SERPL-MCNC: 178 MG/DL
TSH SERPL DL<=0.05 MIU/L-ACNC: 1.28 MIU/ML (ref 0.46–4.68)
VIT B12 BLD-MCNC: 763 PG/ML (ref 239–931)
VLDLC SERPL-MCNC: 35.6 MG/DL
WBC NRBC COR # BLD: 4.05 10*3/MM3 (ref 3.2–9.8)

## 2018-08-07 PROCEDURE — 85025 COMPLETE CBC W/AUTO DIFF WBC: CPT | Performed by: INTERNAL MEDICINE

## 2018-08-07 PROCEDURE — 83735 ASSAY OF MAGNESIUM: CPT | Performed by: INTERNAL MEDICINE

## 2018-08-07 PROCEDURE — 82306 VITAMIN D 25 HYDROXY: CPT | Performed by: INTERNAL MEDICINE

## 2018-08-07 PROCEDURE — 80053 COMPREHEN METABOLIC PANEL: CPT | Performed by: INTERNAL MEDICINE

## 2018-08-07 PROCEDURE — 84443 ASSAY THYROID STIM HORMONE: CPT | Performed by: INTERNAL MEDICINE

## 2018-08-07 PROCEDURE — 82607 VITAMIN B-12: CPT | Performed by: INTERNAL MEDICINE

## 2018-08-07 PROCEDURE — 36415 COLL VENOUS BLD VENIPUNCTURE: CPT | Performed by: INTERNAL MEDICINE

## 2018-08-07 PROCEDURE — 83036 HEMOGLOBIN GLYCOSYLATED A1C: CPT | Performed by: INTERNAL MEDICINE

## 2018-08-07 PROCEDURE — 80061 LIPID PANEL: CPT | Performed by: INTERNAL MEDICINE

## 2018-08-14 ENCOUNTER — OFFICE VISIT (OUTPATIENT)
Dept: FAMILY MEDICINE CLINIC | Facility: CLINIC | Age: 63
End: 2018-08-14

## 2018-08-14 VITALS
WEIGHT: 207.2 LBS | HEIGHT: 64 IN | BODY MASS INDEX: 35.37 KG/M2 | SYSTOLIC BLOOD PRESSURE: 138 MMHG | TEMPERATURE: 98.3 F | DIASTOLIC BLOOD PRESSURE: 86 MMHG | HEART RATE: 64 BPM

## 2018-08-14 DIAGNOSIS — G47.9 SLEEP DISORDER: Chronic | ICD-10-CM

## 2018-08-14 DIAGNOSIS — E11.9 TYPE 2 DIABETES MELLITUS WITHOUT COMPLICATION, WITHOUT LONG-TERM CURRENT USE OF INSULIN (HCC): Chronic | ICD-10-CM

## 2018-08-14 DIAGNOSIS — F33.42 RECURRENT MAJOR DEPRESSIVE DISORDER, IN FULL REMISSION (HCC): Chronic | ICD-10-CM

## 2018-08-14 DIAGNOSIS — C64.1: Chronic | ICD-10-CM

## 2018-08-14 DIAGNOSIS — E78.2 MIXED HYPERLIPIDEMIA: Primary | Chronic | ICD-10-CM

## 2018-08-14 DIAGNOSIS — E55.9 VITAMIN D DEFICIENCY: Chronic | ICD-10-CM

## 2018-08-14 DIAGNOSIS — F41.1 GENERALIZED ANXIETY DISORDER: Chronic | ICD-10-CM

## 2018-08-14 DIAGNOSIS — D51.3 OTHER DIETARY VITAMIN B12 DEFICIENCY ANEMIA: Chronic | ICD-10-CM

## 2018-08-14 DIAGNOSIS — IMO0001 CLASS 2 OBESITY DUE TO EXCESS CALORIES WITH SERIOUS COMORBIDITY AND BODY MASS INDEX (BMI) OF 35.0 TO 35.9 IN ADULT: Chronic | ICD-10-CM

## 2018-08-14 DIAGNOSIS — I83.93 VARICOSE VEINS OF BOTH LOWER EXTREMITIES: Chronic | ICD-10-CM

## 2018-08-14 DIAGNOSIS — I10 ESSENTIAL HYPERTENSION: Chronic | ICD-10-CM

## 2018-08-14 DIAGNOSIS — D50.0 IRON DEFICIENCY ANEMIA DUE TO CHRONIC BLOOD LOSS: Chronic | ICD-10-CM

## 2018-08-14 DIAGNOSIS — C91.10 CHRONIC LYMPHATIC LEUKEMIA (HCC): Chronic | ICD-10-CM

## 2018-08-14 DIAGNOSIS — E83.42 HYPOMAGNESEMIA: Chronic | ICD-10-CM

## 2018-08-14 PROCEDURE — 99214 OFFICE O/P EST MOD 30 MIN: CPT | Performed by: INTERNAL MEDICINE

## 2018-08-14 RX ORDER — ALPRAZOLAM 0.5 MG/1
.5-1 TABLET ORAL NIGHTLY PRN
Qty: 60 TABLET | Refills: 2 | Status: SHIPPED | OUTPATIENT
Start: 2018-08-14 | End: 2019-02-20 | Stop reason: SDUPTHER

## 2018-08-14 NOTE — PATIENT INSTRUCTIONS
Exercising to Lose Weight  Exercising can help you to lose weight. In order to lose weight through exercise, you need to do vigorous-intensity exercise. You can tell that you are exercising with vigorous intensity if you are breathing very hard and fast and cannot hold a conversation while exercising.  Moderate-intensity exercise helps to maintain your current weight. You can tell that you are exercising at a moderate level if you have a higher heart rate and faster breathing, but you are still able to hold a conversation.  How often should I exercise?  Choose an activity that you enjoy and set realistic goals. Your health care provider can help you to make an activity plan that works for you. Exercise regularly as directed by your health care provider. This may include:  · Doing resistance training twice each week, such as:  ? Push-ups.  ? Sit-ups.  ? Lifting weights.  ? Using resistance bands.  · Doing a given intensity of exercise for a given amount of time. Choose from these options:  ? 150 minutes of moderate-intensity exercise every week.  ? 75 minutes of vigorous-intensity exercise every week.  ? A mix of moderate-intensity and vigorous-intensity exercise every week.    Children, pregnant women, people who are out of shape, people who are overweight, and older adults may need to consult a health care provider for individual recommendations. If you have any sort of medical condition, be sure to consult your health care provider before starting a new exercise program.  What are some activities that can help me to lose weight?  · Walking at a rate of at least 4.5 miles an hour.  · Jogging or running at a rate of 5 miles per hour.  · Biking at a rate of at least 10 miles per hour.  · Lap swimming.  · Roller-skating or in-line skating.  · Cross-country skiing.  · Vigorous competitive sports, such as football, basketball, and soccer.  · Jumping rope.  · Aerobic dancing.  How can I be more active in my day-to-day  activities?  · Use the stairs instead of the elevator.  · Take a walk during your lunch break.  · If you drive, park your car farther away from work or school.  · If you take public transportation, get off one stop early and walk the rest of the way.  · Make all of your phone calls while standing up and walking around.  · Get up, stretch, and walk around every 30 minutes throughout the day.  What guidelines should I follow while exercising?  · Do not exercise so much that you hurt yourself, feel dizzy, or get very short of breath.  · Consult your health care provider prior to starting a new exercise program.  · Wear comfortable clothes and shoes with good support.  · Drink plenty of water while you exercise to prevent dehydration or heat stroke. Body water is lost during exercise and must be replaced.  · Work out until you breathe faster and your heart beats faster.  This information is not intended to replace advice given to you by your health care provider. Make sure you discuss any questions you have with your health care provider.  Document Released: 01/20/2012 Document Revised: 05/25/2017 Document Reviewed: 05/21/2015  [employer] Interactive Patient Education © 2018 [employer]      Calorie Counting for Weight Loss  Calories are units of energy. Your body needs a certain amount of calories from food to keep you going throughout the day. When you eat more calories than your body needs, your body stores the extra calories as fat. When you eat fewer calories than your body needs, your body burns fat to get the energy it needs.  Calorie counting means keeping track of how many calories you eat and drink each day. Calorie counting can be helpful if you need to lose weight. If you make sure to eat fewer calories than your body needs, you should lose weight. Ask your health care provider what a healthy weight is for you.  For calorie counting to work, you will need to eat the right number of calories in a day in order  to lose a healthy amount of weight per week. A dietitian can help you determine how many calories you need in a day and will give you suggestions on how to reach your calorie goal.  · A healthy amount of weight to lose per week is usually 1-2 lb (0.5-0.9 kg). This usually means that your daily calorie intake should be reduced by 500-750 calories.  · Eating 1,200 - 1,500 calories per day can help most women lose weight.  · Eating 1,500 - 1,800 calories per day can help most men lose weight.    What do I need to know about calorie counting?  In order to meet your daily calorie goal, you will need to:  · Find out how many calories are in each food you would like to eat. Try to do this before you eat.  · Decide how much of the food you plan to eat.  · Write down what you ate and how many calories it had. Doing this is called keeping a food log.    To successfully lose weight, it is important to balance calorie counting with a healthy lifestyle that includes regular activity. Aim for 150 minutes of moderate exercise (such as walking) or 75 minutes of vigorous exercise (such as running) each week.  Where do I find calorie information?    The number of calories in a food can be found on a Nutrition Facts label. If a food does not have a Nutrition Facts label, try to look up the calories online or ask your dietitian for help.  Remember that calories are listed per serving. If you choose to have more than one serving of a food, you will have to multiply the calories per serving by the amount of servings you plan to eat. For example, the label on a package of bread might say that a serving size is 1 slice and that there are 90 calories in a serving. If you eat 1 slice, you will have eaten 90 calories. If you eat 2 slices, you will have eaten 180 calories.  How do I keep a food log?  Immediately after each meal, record the following information in your food log:  · What you ate. Don't forget to include toppings, sauces, and  "other extras on the food.  · How much you ate. This can be measured in cups, ounces, or number of items.  · How many calories each food and drink had.  · The total number of calories in the meal.    Keep your food log near you, such as in a small notebook in your pocket, or use a mobile eloisa or website. Some programs will calculate calories for you and show you how many calories you have left for the day to meet your goal.  What are some calorie counting tips?  · Use your calories on foods and drinks that will fill you up and not leave you hungry:  ? Some examples of foods that fill you up are nuts and nut butters, vegetables, lean proteins, and high-fiber foods like whole grains. High-fiber foods are foods with more than 5 g fiber per serving.  ? Drinks such as sodas, specialty coffee drinks, alcohol, and juices have a lot of calories, yet do not fill you up.  · Eat nutritious foods and avoid empty calories. Empty calories are calories you get from foods or beverages that do not have many vitamins or protein, such as candy, sweets, and soda. It is better to have a nutritious high-calorie food (such as an avocado) than a food with few nutrients (such as a bag of chips).  · Know how many calories are in the foods you eat most often. This will help you calculate calorie counts faster.  · Pay attention to calories in drinks. Low-calorie drinks include water and unsweetened drinks.  · Pay attention to nutrition labels for \"low fat\" or \"fat free\" foods. These foods sometimes have the same amount of calories or more calories than the full fat versions. They also often have added sugar, starch, or salt, to make up for flavor that was removed with the fat.  · Find a way of tracking calories that works for you. Get creative. Try different apps or programs if writing down calories does not work for you.  What are some portion control tips?  · Know how many calories are in a serving. This will help you know how many servings of " a certain food you can have.  · Use a measuring cup to measure serving sizes. You could also try weighing out portions on a kitchen scale. With time, you will be able to estimate serving sizes for some foods.  · Take some time to put servings of different foods on your favorite plates, bowls, and cups so you know what a serving looks like.  · Try not to eat straight from a bag or box. Doing this can lead to overeating. Put the amount you would like to eat in a cup or on a plate to make sure you are eating the right portion.  · Use smaller plates, glasses, and bowls to prevent overeating.  · Try not to multitask (for example, watch TV or use your computer) while eating. If it is time to eat, sit down at a table and enjoy your food. This will help you to know when you are full. It will also help you to be aware of what you are eating and how much you are eating.  What are tips for following this plan?  Reading food labels  · Check the calorie count compared to the serving size. The serving size may be smaller than what you are used to eating.  · Check the source of the calories. Make sure the food you are eating is high in vitamins and protein and low in saturated and trans fats.  Shopping  · Read nutrition labels while you shop. This will help you make healthy decisions before you decide to purchase your food.  · Make a grocery list and stick to it.  Cooking  · Try to cook your favorite foods in a healthier way. For example, try baking instead of frying.  · Use low-fat dairy products.  Meal planning  · Use more fruits and vegetables. Half of your plate should be fruits and vegetables.  · Include lean proteins like poultry and fish.  How do I count calories when eating out?  · Ask for smaller portion sizes.  · Consider sharing an entree and sides instead of getting your own entree.  · If you get your own entree, eat only half. Ask for a box at the beginning of your meal and put the rest of your entree in it so you are  not tempted to eat it.  · If calories are listed on the menu, choose the lower calorie options.  · Choose dishes that include vegetables, fruits, whole grains, low-fat dairy products, and lean protein.  · Choose items that are boiled, broiled, grilled, or steamed. Stay away from items that are buttered, battered, fried, or served with cream sauce. Items labeled “crispy” are usually fried, unless stated otherwise.  · Choose water, low-fat milk, unsweetened iced tea, or other drinks without added sugar. If you want an alcoholic beverage, choose a lower calorie option such as a glass of wine or light beer.  · Ask for dressings, sauces, and syrups on the side. These are usually high in calories, so you should limit the amount you eat.  · If you want a salad, choose a garden salad and ask for grilled meats. Avoid extra toppings like rodriguez, cheese, or fried items. Ask for the dressing on the side, or ask for olive oil and vinegar or lemon to use as dressing.  · Estimate how many servings of a food you are given. For example, a serving of cooked rice is ½ cup or about the size of half a baseball. Knowing serving sizes will help you be aware of how much food you are eating at restaurants. The list below tells you how big or small some common portion sizes are based on everyday objects:  ? 1 oz--4 stacked dice.  ? 3 oz--1 deck of cards.  ? 1 tsp--1 die.  ? 1 Tbsp--½ a ping-pong ball.  ? 2 Tbsp--1 ping-pong ball.  ? ½ cup--½ baseball.  ? 1 cup--1 baseball.  Summary  · Calorie counting means keeping track of how many calories you eat and drink each day. If you eat fewer calories than your body needs, you should lose weight.  · A healthy amount of weight to lose per week is usually 1-2 lb (0.5-0.9 kg). This usually means reducing your daily calorie intake by 500-750 calories.  · The number of calories in a food can be found on a Nutrition Facts label. If a food does not have a Nutrition Facts label, try to look up the calories  online or ask your dietitian for help.  · Use your calories on foods and drinks that will fill you up, and not on foods and drinks that will leave you hungry.  · Use smaller plates, glasses, and bowls to prevent overeating.  This information is not intended to replace advice given to you by your health care provider. Make sure you discuss any questions you have with your health care provider.  Document Released: 12/18/2006 Document Revised: 11/17/2017 Document Reviewed: 11/17/2017  [employer] Interactive Patient Education © 2018 [employer]

## 2018-08-14 NOTE — PROGRESS NOTES
Subjective        History of Present Illness     Chio Mary is a 62 y.o. female six month follow up on multiple medical issues including diabetes, hypertension, high cholesterol, CLL, vitamin D deficiency, and obesity among other issues. She has history of right nephrectomy by Dr. Franke in 2003 due to RCCA.  Dr. Soriano in [place] follows her CLL every three months.  She has an appointment with Dr. Soriano next week.  She denies current symptoms of night sweats or unexplained weight loss.    Diabetes has progressed slightly.  Last February, we had her stop the metformin due to chronic diarrhea, even on low-dose metformin, and start Jardiance 10 mg one tablet q.a.m..  She had some mild vaginitis symptoms at onset of Jardiance, but this has improved.  We reviewed diabetic diet principles.  Weight is up 4 pounds in the past six months.  She reports some persistent episodes of postprandial diarrhea despite stopping the metformin.  I recommended she premedicate with Imodium when she is going to be having a meal out.      Dr. Arana performed extensive workup for chronic venous insufficiency, for which he recommended medical management.  She has difficulty tolerating compression stockings, especially during the summer months.  She continues to have some lower extremity pain, which is adequately managed.      DEXA 11/2016 revealed normal bone density.  She is up-to-date on colonoscopy.  Colonoscopy revealed diverticulosis in the sigmoid colon as well as one 5 mm polyp in the ascending colon and medium-sized lipoma in the transverse colon.     She struggles with depression and generalized anxiety disorder with some sleep difficulties.  However, she reports adequate control of anxiety on her current combination of Xanax and fluoxetine with no tolerability issues.  She takes Xanax 0.5 to 1 mg nightly to help with sleep and denies side effects including excessive daytime sedation or hallucinations.  She has a  "prescribers agreement in place and reports that she is using her medications compliantly.  Her Ezequiel reported as unremarkable.      Blood pressure at goal.      She is up to date on immunizations.      The patient's relevant past medical, surgical, and social history was reviewed in Epic.   Lab results are reviewed with the patient today.  CBC unremarkable. Fasting glucose 115.  A1c is 6.4. Renal and liver function normal.  Vitamin D and vitamin B-12 at goal.        Review of Systems   Constitutional: Negative for chills, fatigue and fever.   HENT: Negative for congestion, ear pain, postnasal drip, sinus pressure and sore throat.    Respiratory: Negative for cough, shortness of breath and wheezing.    Cardiovascular: Negative for chest pain, palpitations and leg swelling.   Gastrointestinal: Positive for diarrhea. Negative for abdominal pain, blood in stool, constipation, nausea and vomiting.   Endocrine: Negative for cold intolerance, heat intolerance, polydipsia and polyuria.   Genitourinary: Negative for dysuria, frequency, hematuria and urgency.   Skin: Negative for rash.   Neurological: Negative for syncope and weakness.   Psychiatric/Behavioral: The patient is nervous/anxious.         Objective     Vitals:    08/14/18 1037   BP: 138/86   Pulse: 64   Temp: 98.3 °F (36.8 °C)   TempSrc: Oral   Weight: 94 kg (207 lb 3.2 oz)   Height: 162.6 cm (64\")     Physical Exam   Constitutional: She is oriented to person, place, and time. She appears well-developed and well-nourished. No distress.   Obese female.    HENT:   Head: Normocephalic and atraumatic.   Nose: Right sinus exhibits no maxillary sinus tenderness and no frontal sinus tenderness. Left sinus exhibits no maxillary sinus tenderness and no frontal sinus tenderness.   Mouth/Throat: Uvula is midline, oropharynx is clear and moist and mucous membranes are normal. No oral lesions. No tonsillar exudate.   Eyes: Pupils are equal, round, and reactive to light. " Conjunctivae and EOM are normal.   Neck: Trachea normal. Neck supple. No JVD present. Carotid bruit is not present. No tracheal deviation present. No thyroid mass and no thyromegaly present.   Cardiovascular: Normal rate, regular rhythm, normal heart sounds and intact distal pulses.   No extrasystoles are present. PMI is not displaced.    No murmur heard.  Pulmonary/Chest: Effort normal and breath sounds normal. No accessory muscle usage. No respiratory distress. She has no decreased breath sounds. She has no wheezes. She has no rhonchi. She has no rales.   Abdominal: Soft. Bowel sounds are normal. She exhibits no distension. There is no hepatosplenomegaly. There is no tenderness.   Obese abdomen limits exam.      Vascular Status -  Her right foot exhibits normal foot vasculature  (Extensive varicosities noted bilateral lower extremities.) and no edema. Her left foot exhibits normal foot vasculature  and no edema.  Lymphadenopathy:     She has no cervical adenopathy.   Neurological: She is alert and oriented to person, place, and time. No cranial nerve deficit. Coordination normal.   Skin: Skin is warm, dry and intact. No rash noted. No cyanosis. Nails show no clubbing.   Psychiatric: She has a normal mood and affect. Her speech is normal and behavior is normal. Judgment and thought content normal.   Vitals reviewed.      Assessment/Plan      No medication changes made today.    Continue the Lipitor and dietary efforts.    She can premedicate with Imodium to help with episodic postprandial diarrhea.  Also use the Imodium per label directions after diarrhea or loose stools.     Continue the Jardiance.  Pursue diet, exercise, and weight loss efforts.  Written literature regarding diet and exercise included in patient's AVS today.  She has annual diabetic eye exams by Dr. Worrell.   Continue monitoring glucose daily      Continue combination of Xanax and fluoxetine.  A refill is given for Xanax 0.5 mg to take 1-2  tablets by mouth At Night As Needed for Sleep.  I also suggested she continue to try dosing adjustments/reductions regularly.  Patient understands the risks associated with this controlled medication, including tolerance and addiction.  She also agrees to only obtain this medication from me, and not from a another provider, unless that provider is covering for me in my absence.  She also agrees to be compliant in dosing, and not self adjust the dose of medication.  A signed controlled substance agreement is on file, and she has received a controlled substance education sheet at this a previous visit.  She has also signed a consent for treatment with a controlled substance as per [facility] policy. MURALI was obtained.    Continue the other medications and vitamin/mineral supplements to treat these other issues we addressed.    Continue to follow with [facility].    She is not inclined to wear compression stockings during the summer months.    Encouraged her to wear full-length compression stocking/hosiery during the rest of the year.  She has never tried the full-length pantyhosery.    Return in six months for follow up with fasting labs one week prior.     Scribed for Dr. Quiñonez by Catherine Maldonado [facility].     Diagnoses and all orders for this visit:    Mixed hyperlipidemia  -     LDL Cholesterol, Direct; Future    Essential hypertension  -     CBC Auto Differential; Future  -     Comprehensive Metabolic Panel; Future    Type 2 diabetes mellitus without complication, without long-term current use of insulin (CMS/[facility])  -     Hemoglobin A1c; Future    Varicose veins of both lower extremities    Vitamin D deficiency  -     Vitamin D 25 Hydroxy; Future    Class 2 obesity due to excess calories with serious comorbidity and body mass index (BMI) of 35.0 to 35.9 in adult    Primary malignant neoplasm of right kidney - R nephrectomy 2003    Iron deficiency anemia secondary to blood loss (chronic)  -      Ferritin; Future  -     Vitamin B12; Future    Chronic lymphatic leukemia (CMS/HCC)    Sleep disorder    Hypomagnesemia    Generalized anxiety disorder    Recurrent major depressive disorder, in full remission (CMS/HCC)    Other dietary vitamin B12 deficiency anemia  -     Vitamin B12; Future    Other orders  -     ALPRAZolam (XANAX) 0.5 MG tablet; Take 1-2 tablets by mouth At Night As Needed for Sleep.        Lab on 08/07/2018   Component Date Value Ref Range Status   • WBC 08/07/2018 4.05  3.20 - 9.80 10*3/mm3 Final   • RBC 08/07/2018 4.56  3.77 - 5.16 10*6/mm3 Final   • Hemoglobin 08/07/2018 13.7  12.0 - 15.5 g/dL Final   • Hematocrit 08/07/2018 42.4  35.0 - 45.0 % Final   • MCV 08/07/2018 93.0  80.0 - 98.0 fL Final   • MCH 08/07/2018 30.0  26.5 - 34.0 pg Final   • MCHC 08/07/2018 32.3  31.4 - 36.0 g/dL Final   • RDW 08/07/2018 15.2* 11.5 - 14.5 % Final   • RDW-SD 08/07/2018 49.8* 36.4 - 46.3 fl Final   • MPV 08/07/2018 10.1  8.0 - 12.0 fL Final   • Platelets 08/07/2018 119* 150 - 450 10*3/mm3 Final   • Neutrophil % 08/07/2018 41.5  37.0 - 80.0 % Final   • Lymphocyte % 08/07/2018 48.4  10.0 - 50.0 % Final   • Monocyte % 08/07/2018 7.2  0.0 - 12.0 % Final   • Eosinophil % 08/07/2018 2.2  0.0 - 7.0 % Final   • Basophil % 08/07/2018 0.7  0.0 - 2.0 % Final   • Neutrophils, Absolute 08/07/2018 1.68* 2.00 - 8.60 10*3/mm3 Final   • Lymphocytes, Absolute 08/07/2018 1.96  0.60 - 4.20 10*3/mm3 Final   • Monocytes, Absolute 08/07/2018 0.29  0.00 - 0.90 10*3/mm3 Final   • Eosinophils, Absolute 08/07/2018 0.09  0.00 - 0.70 10*3/mm3 Final   • Basophils, Absolute 08/07/2018 0.03  0.00 - 0.20 10*3/mm3 Final   • Glucose 08/07/2018 115* 74 - 99 mg/dL Final   • BUN 08/07/2018 16  7 - 17 mg/dL Final   • Creatinine 08/07/2018 1.04  0.52 - 1.04 mg/dL Final   • Sodium 08/07/2018 145  137 - 145 mmol/L Final   • Potassium 08/07/2018 4.1  3.4 - 5.0 mmol/L Final   • Chloride 08/07/2018 109* 98 - 107 mmol/L Final   • CO2 08/07/2018 29.0   22.0 - 30.0 mmol/L Final   • Calcium 08/07/2018 9.3  8.4 - 10.2 mg/dL Final   • Total Protein 08/07/2018 7.3  6.3 - 8.2 g/dL Final   • Albumin 08/07/2018 4.30  3.50 - 5.00 g/dL Final   • ALT (SGPT) 08/07/2018 18  <=35 U/L Final   • AST (SGOT) 08/07/2018 21  14 - 36 U/L Final   • Alkaline Phosphatase 08/07/2018 105  38 - 126 U/L Final   • Total Bilirubin 08/07/2018 1.7* 0.2 - 1.3 mg/dL Final   • eGFR Non  Amer 08/07/2018 53  45 - 104 mL/min/1.73 Final   • Globulin 08/07/2018 3.0  2.3 - 3.5 gm/dL Final   • A/G Ratio 08/07/2018 1.4  1.1 - 1.8 g/dL Final   • BUN/Creatinine Ratio 08/07/2018 15.4  7.0 - 25.0 Final   • Anion Gap 08/07/2018 7.0  5.0 - 15.0 mmol/L Final   • Vitamin B-12 08/07/2018 763  239 - 931 pg/mL Final   • 25 Hydroxy, Vitamin D 08/07/2018 51.9  30.0 - 100.0 ng/ml Final   • Hemoglobin A1C 08/07/2018 6.4* 4 - 5.6 % Final   • Total Cholesterol 08/07/2018 144* 150 - 200 mg/dL Final   • Triglycerides 08/07/2018 178* <=150 mg/dL Final   • HDL Cholesterol 08/07/2018 35* 40 - 59 mg/dL Final   • LDL Cholesterol  08/07/2018 73  <=100 mg/dL Final   • VLDL Cholesterol 08/07/2018 35.6  mg/dL Final   • LDL/HDL Ratio 08/07/2018 2.10  0.00 - 3.22 Final   • Magnesium 08/07/2018 1.7  1.6 - 2.3 mg/dL Final   • TSH 08/07/2018 1.280  0.460 - 4.680 mIU/mL Final   ]

## 2018-09-24 ENCOUNTER — OFFICE VISIT (OUTPATIENT)
Dept: OBSTETRICS AND GYNECOLOGY | Facility: CLINIC | Age: 63
End: 2018-09-24

## 2018-09-24 VITALS
SYSTOLIC BLOOD PRESSURE: 142 MMHG | RESPIRATION RATE: 16 BRPM | BODY MASS INDEX: 35.71 KG/M2 | HEIGHT: 64 IN | DIASTOLIC BLOOD PRESSURE: 88 MMHG | WEIGHT: 209.2 LBS | HEART RATE: 78 BPM

## 2018-09-24 DIAGNOSIS — Z01.419 ENCOUNTER FOR GYNECOLOGICAL EXAMINATION: Primary | ICD-10-CM

## 2018-09-24 DIAGNOSIS — N90.89 VULVAR IRRITATION: ICD-10-CM

## 2018-09-24 DIAGNOSIS — Z12.72 VAGINAL PAP SMEAR: ICD-10-CM

## 2018-09-24 PROCEDURE — G0101 CA SCREEN;PELVIC/BREAST EXAM: HCPCS | Performed by: NURSE PRACTITIONER

## 2018-09-24 PROCEDURE — G0123 SCREEN CERV/VAG THIN LAYER: HCPCS | Performed by: NURSE PRACTITIONER

## 2018-09-24 PROCEDURE — 87210 SMEAR WET MOUNT SALINE/INK: CPT | Performed by: NURSE PRACTITIONER

## 2018-09-24 RX ORDER — FAMOTIDINE 20 MG/1
1 TABLET, FILM COATED ORAL
COMMUNITY
End: 2018-09-24 | Stop reason: ALTCHOICE

## 2018-09-25 NOTE — PROGRESS NOTES
"Subjective   Chief Complaint   Patient presents with   • Gynecologic Exam     well woman annual visit     Chio Mary is a 62 y.o. year old  presenting to be seen for her annual exam.  Today she has had concerns of vaginitis since starting Jardiance earlier this year. She stopped taking last week and symptoms have greatly decreased. She describes it as \"raw and irritated\" Externally mostly. Does wear pads but denies any recent changes in soaps or products.     She is not sexually active.  In the past 12 months there has not been new sexual partners.      She exercises regularly: no.  She wears her seat belt:yes.  She has concerns about domestic violence: no.  She is taking Vit D and Calcium:yes  Last colonoscopy: , normal, has ever 2 years due to hx of polyps  Last DEXA: 14  Last MM18, 2 sisters with breast cancer  Last PAP: , exam only. No pap testing in many many years per pt.   Hx of abnormal pap: No      SURGICAL MENOPAUSE  LMP: Hysterectomy w/BSO      PMH: CLL, Kidney cancer with nephrectomy, Bartholin's Gland abscess in May 2018, polyps in the colon.   OB History      Para Term  AB Living    2 2 2          SAB TAB Ectopic Molar Multiple Live Births                         No Additional Complaints Reported    The following portions of the patient's history were reviewed and updated as appropriate:problem list, current medications, allergies, past family history, past medical history, past social history and past surgical history.    Smoking status: Former Smoker                                                              Packs/day: 0.00      Years: 0.00         Quit date:   Smokeless tobacco: Never Used                        Review of Systems   Constitutional: Negative.  Negative for chills and fever.   Respiratory: Negative.    Cardiovascular: Negative.    Gastrointestinal: Negative.    Endocrine: Negative.    Genitourinary: Negative.  Negative for " "dysuria, genital sores, pelvic pain, urgency, vaginal bleeding, vaginal discharge and vaginal pain.        Vulvar irritation   Skin: Negative.    Neurological: Negative for dizziness, syncope, light-headedness and headaches.   Psychiatric/Behavioral: Negative for suicidal ideas. The patient is not nervous/anxious.         Anxiety and depression well controlled          Objective   /88 (BP Location: Right arm, Patient Position: Sitting, Cuff Size: Adult)   Pulse 78   Resp 16   Ht 162.6 cm (64\")   Wt 94.9 kg (209 lb 3.2 oz)   LMP  (LMP Unknown) Comment: Hyst w/ BSO in 1990  Breastfeeding? No   BMI 35.91 kg/m²     General:  well developed; well nourished  no acute distress   Skin:  multiple seborrheic keratosis on abdomen, chest and back   Cardiac: Heart sounds are normal.  Regular rate and rhythm without murmur, gallop or rub.   Resp:  Normal expansion.  Clear to auscultation.  No rales, rhonchi, or wheezing.   Thyroid: not examined   Breasts:  Examined in supine position  Symmetric without masses or skin dimpling  Nipples normal without inversion, lesions or discharge  There are no palpable axillary nodes   Abdomen: soft, non-tender; no masses  no umbilical or inginual hernias are present  no hepato-splenomegaly  Normal findings: bowel sounds normal   Psych: alert,oriented, in NAD with a full range of affect, normal behavior and no psychotic features   Pelvis: Clinical staff was present for exam  External genitalia:  erythema on the labia majora and above the clitoris on the mons pubis. Appears to be mildly lichenified. No fissures, discharge, flaking.   :  urethral meatus normal;  Vaginal:  atrophic mucosal changes are present; wet prep: positive for atrophic epithelial cells without clue cells, yeast buds, hyphae or trichomonads. Evaluated by CHEO Holt  Cervix:  absent.  Uterus:  absent.  Adnexa:  absent, bilateral.  Rectal:  anus visually normal appearing. recto-vaginal exam " unremarkable and confirms findings; guaiac negative;     Lab Review   CBC, CMP, TSH and lipid    Imaging  DEXA  Mammogram report       Diagnoses and all orders for this visit:    Encounter for gynecological examination  -     Liquid-based Pap Smear, Screening    Vaginal Pap smear  -     Liquid-based Pap Smear, Screening  Pap results: I will send card in mail or call if abnormal. Cytology collected as pt has not had in many many years. RTC every 2 years for well woman exams.    Vulvar irritation  -     triamcinolone (KENALOG) 0.1 % ointment; Apply  topically to the appropriate area as directed Every Night.  Discussed findings on exam. Negative wet prep. Some atrophy but I believe external irritation is not fungal and would best respond to topical steroid ointment. Begin nightly x 7 nights, up to 2 weeks PRN. Then use PRN. Pt is scheduled with Dr. Wright to discuss other diabetes medications for management as Jardiance does have this as a possible side effects. Pt cannot tolerate Metformin due to GI side effects. RTC if symptoms, persist or worsen despite treatment.        This note was electronically signed.    Priti Hernandez, APRN  September 25, 2018

## 2018-09-28 LAB
LAB AP CASE REPORT: NORMAL
LAB AP GYN ADDITIONAL INFORMATION: NORMAL
LAB AP GYN OTHER FINDINGS: NORMAL
PATH INTERP SPEC-IMP: NORMAL
STAT OF ADQ CVX/VAG CYTO-IMP: NORMAL

## 2018-10-01 ENCOUNTER — OFFICE VISIT (OUTPATIENT)
Dept: FAMILY MEDICINE CLINIC | Facility: CLINIC | Age: 63
End: 2018-10-01

## 2018-10-01 VITALS
HEIGHT: 64 IN | WEIGHT: 210.8 LBS | TEMPERATURE: 98.5 F | BODY MASS INDEX: 35.99 KG/M2 | HEART RATE: 80 BPM | SYSTOLIC BLOOD PRESSURE: 150 MMHG | DIASTOLIC BLOOD PRESSURE: 100 MMHG

## 2018-10-01 DIAGNOSIS — T50.905A MEDICATION SIDE EFFECT, INITIAL ENCOUNTER: Primary | ICD-10-CM

## 2018-10-01 DIAGNOSIS — I10 ESSENTIAL HYPERTENSION: Chronic | ICD-10-CM

## 2018-10-01 DIAGNOSIS — E66.01 CLASS 2 SEVERE OBESITY DUE TO EXCESS CALORIES WITH SERIOUS COMORBIDITY AND BODY MASS INDEX (BMI) OF 35.0 TO 35.9 IN ADULT (HCC): Chronic | ICD-10-CM

## 2018-10-01 DIAGNOSIS — E11.9 TYPE 2 DIABETES MELLITUS WITHOUT COMPLICATION, WITHOUT LONG-TERM CURRENT USE OF INSULIN (HCC): Chronic | ICD-10-CM

## 2018-10-01 PROCEDURE — 99214 OFFICE O/P EST MOD 30 MIN: CPT | Performed by: INTERNAL MEDICINE

## 2018-10-01 NOTE — PROGRESS NOTES
Subjective          History of Present Illness     Chio Mary is a 63 y.o. female with multiple medical issues including diabetes, hypertension, high cholesterol, CLL, vitamin D deficiency, and obesity among other issues.   When she was here in February, I had her stop metformin due to diarrhea and start Jardiance after experiencing postprandial diarrhea even with low dose metformin.  She has now stopped the Jardiance due to frequent vaginitis symptoms and reports she feels much better since stopping the Jardiance.  She was seen by Priti Alvarez for GYN exam last week, for which she prescribed Kenalog to apply topically for the vaginitis symptoms.  She is asking about alternative medication to manage her diabetes.  A1c was 6.1 with labs in August 2018.   We discussed options including weekly injections with Ozempic or Trulicity.  Ozempic is included on her formulary and the first injection is demonstrated today in the office.  She does not have a glucometer at home.  She had one for years, but stopped using it and does not know where he is.  We will send a prescription for a glucometer and test strips.       Blood pressure is above goal today at 150/100.   She attributes the elevated blood pressure to some increased stress this morning after helping her daughter out after having a vehicle break down.  She doesn't check BP at home, as she does not have a blood pressure cuff.   Weight is up 4 pounds in the past five months.  We discussed how she was getting appetite suppression with the Jardiance and will need to intensify her diet and weight loss efforts without the Jardiance.        Review of Systems   Constitutional: Negative for chills, fatigue and fever.   HENT: Negative for congestion, ear pain, postnasal drip, sinus pressure and sore throat.    Respiratory: Negative for cough, shortness of breath and wheezing.    Cardiovascular: Negative for chest pain, palpitations and leg swelling.  "  Gastrointestinal: Negative for abdominal pain, blood in stool, constipation, diarrhea, nausea and vomiting.   Endocrine: Negative for cold intolerance, heat intolerance, polydipsia and polyuria.   Genitourinary: Negative for dysuria, frequency, hematuria and urgency.   Skin: Negative for rash.   Neurological: Negative for syncope and weakness.        Objective      Vitals:    10/01/18 1355   BP: 150/100   Pulse: 80   Temp: 98.5 °F (36.9 °C)   TempSrc: Oral   Weight: 95.6 kg (210 lb 12.8 oz)   Height: 162.6 cm (64\")         Physical Exam   Constitutional: She is oriented to person, place, and time. She appears well-developed and well-nourished. No distress.   Very pleasant, obese female.   HENT:   Head: Normocephalic and atraumatic.   Nose: Nose normal.   Mouth/Throat: Oropharynx is clear and moist. No oropharyngeal exudate.   Eyes: Pupils are equal, round, and reactive to light. EOM are normal.   Neck: Neck supple. No JVD present. No thyromegaly present.   Cardiovascular: Normal rate, regular rhythm and normal heart sounds.    Pulmonary/Chest: Effort normal and breath sounds normal. No accessory muscle usage. No respiratory distress. She has no wheezes. She has no rales.   Abdominal: Soft. Bowel sounds are normal. She exhibits no distension. There is no tenderness.   Obese abdomen limits exam.    Musculoskeletal: She exhibits no edema.   Lymphadenopathy:     She has no cervical adenopathy.   Neurological: She is alert and oriented to person, place, and time. No cranial nerve deficit.   Psychiatric: She has a normal mood and affect. Her speech is normal and behavior is normal. Judgment and thought content normal.     Future Appointments  Date Time Provider Department Center   2/19/2019 9:30 AM Michael Quiñonez MD [facility] None         Assessment/Plan       The medication side effects with Jardiance is a new problem.  Stop the Jardiance and start once weekly injections of Ozempic 0.25 mg for the first two weeks " increasing to 0.5 mg weekly thereafter.  Her first dose is demonstrated and given today in the office.  Intensify diabetic diet, exercise and weight loss efforts, especially with the upcoming holidays.  Weigh weekly to help track progress.  Increase physical activity.  She was intolerant to low-dose metformin due to diarrhea.  A prescription is sent for glucometer and test strips to monitor glucose 2-3 times weekly alternating morning and evening glucose.     I recommended she stop by in a couple of weeks to recheck blood pressure.  No appointment needed.      I have asked her to reduce portion sizes and intensify efforts at compliance with diabetic diet, exercise, and weight loss goals.  Weekly weights.  Weight loss goal of approximately 3-4 pounds monthly.    Return in February for routine follow up with fasting labs one week prior.     Scribed for Dr. Quiñonez by Catherine Maldonado [facility].     Diagnoses and all orders for this visit:    Medication side effect, initial encounter    Type 2 diabetes mellitus without complication, without long-term current use of insulin (CMS/[facility])    Class 2 severe obesity due to excess calories with serious comorbidity and body mass index (BMI) of 35.0 to 35.9 in adult (CMS/[facility])    Essential hypertension    Other orders  -     Semaglutide (OZEMPIC) 0.25 or 0.5 MG/DOSE solution pen-injector; Inject 0.5 mg under the skin into the appropriate area as directed 1 (One) Time Per Week.  -     glucose blood (ONETOUCH VERIO) test strip; Check glucose daily  E11.9  -     Lancets 30G misc; Check one time daily E11.9 For Verio glucometer        Office Visit on 09/24/2018   Component Date Value Ref Range Status   • Case Report 09/24/2018    Final                    Value:Gynecologic Cytology Report                       Case: [id number]                                  Authorizing Provider:  Priti Hernandez    Collected:           09/24/2018 09:45 AM                                 Moira  APRN                                                                  Ordering Location:     [facility]     Received:            09/24/2018 01:48 PM                                 [facility]                                                               First Screen:          Hortencia Olson                                                          Specimen:    Liquid-Based Pap, Screening, Vagina                                                       • Interpretation 09/24/2018 Negative for intraepithelial lesion or malignancy    Final   • Other Findings 09/24/2018 Atrophy with inflammation   Final   • Specimen Adequacy 09/24/2018 Satisfactory for evaluation   Final   • Additional Information 09/24/2018    Final                    Value:This result contains rich text formatting which cannot be displayed here.   ]

## 2018-11-14 ENCOUNTER — OFFICE VISIT (OUTPATIENT)
Dept: FAMILY MEDICINE CLINIC | Facility: CLINIC | Age: 63
End: 2018-11-14

## 2018-11-14 VITALS
TEMPERATURE: 98.6 F | HEIGHT: 64 IN | WEIGHT: 208.4 LBS | BODY MASS INDEX: 35.58 KG/M2 | DIASTOLIC BLOOD PRESSURE: 110 MMHG | SYSTOLIC BLOOD PRESSURE: 158 MMHG | HEART RATE: 80 BPM

## 2018-11-14 DIAGNOSIS — I10 UNCONTROLLED HYPERTENSION: Primary | ICD-10-CM

## 2018-11-14 DIAGNOSIS — I10 ESSENTIAL HYPERTENSION: Chronic | ICD-10-CM

## 2018-11-14 DIAGNOSIS — C91.10 CHRONIC LYMPHATIC LEUKEMIA (HCC): Chronic | ICD-10-CM

## 2018-11-14 DIAGNOSIS — E66.01 CLASS 2 SEVERE OBESITY DUE TO EXCESS CALORIES WITH SERIOUS COMORBIDITY AND BODY MASS INDEX (BMI) OF 35.0 TO 35.9 IN ADULT (HCC): Chronic | ICD-10-CM

## 2018-11-14 DIAGNOSIS — E87.6 HYPOKALEMIA: Chronic | ICD-10-CM

## 2018-11-14 DIAGNOSIS — C64.1: Chronic | ICD-10-CM

## 2018-11-14 DIAGNOSIS — Z23 NEED FOR PROPHYLACTIC VACCINATION AGAINST STREPTOCOCCUS PNEUMONIAE (PNEUMOCOCCUS): ICD-10-CM

## 2018-11-14 DIAGNOSIS — E11.9 TYPE 2 DIABETES MELLITUS WITHOUT COMPLICATION, WITHOUT LONG-TERM CURRENT USE OF INSULIN (HCC): Chronic | ICD-10-CM

## 2018-11-14 PROCEDURE — 90732 PPSV23 VACC 2 YRS+ SUBQ/IM: CPT | Performed by: INTERNAL MEDICINE

## 2018-11-14 PROCEDURE — G0009 ADMIN PNEUMOCOCCAL VACCINE: HCPCS | Performed by: INTERNAL MEDICINE

## 2018-11-14 PROCEDURE — 99214 OFFICE O/P EST MOD 30 MIN: CPT | Performed by: INTERNAL MEDICINE

## 2018-11-14 RX ORDER — CARVEDILOL 12.5 MG/1
12.5 TABLET ORAL 2 TIMES DAILY
Qty: 180 TABLET | Refills: 3 | Status: SHIPPED | OUTPATIENT
Start: 2018-11-14 | End: 2019-02-20 | Stop reason: SDUPTHER

## 2018-11-14 NOTE — PROGRESS NOTES
Subjective     History of Present Illness     Chio Mary is a 63 y.o. female with multiple medical issues including diabetes, hypertension, high cholesterol, CLL, vitamin D deficiency, and obesity among other issues who comes in today reporting elevated blood pressure.  She continues to follow with Dr. Soriano for CLL in 2009.  She had worsening thrombocytopenia and a rapid rise in leucocyte count making it necessary to initiate treatment with rituximab and bendamustine 11/7/17. She became significantly pancytopenic after one course of therapy. Due to the prolonged pancytopenia and achievement of treatment goals, the decision was made not to pursue further therapy.  Her disease is currently controlled, although, her blood pressure was elevated at 172/118 at her oncology follow up visit yesterday.  She is currently taking Coreg 6.25 mg b.i.d. and lisinopril 20 mg daily.   Her blood pressure was also elevated at 150/100 at her last visit here on 10/01/18.  I recommended she stop by for recheck of blood pressure after that visit, as she felt the elevated blood pressure that day was due to some increased stress.  Today, blood pressure is 158/110.  Potassium was at goal with her labs in August. She plans to purchase a blood pressure cuff.      Ozempic was added to her diabetic regimen in October.  She had GI side effects and constipation when she increased the Ozempic to 0.5 mg.  Therefore, she resumed the 0.25 mg    Glucose has been in normal range. I recommended she gradually try to increase from 0.25 to 0.5 mg     She has history of right nephrectomy by Dr. Franke in 2003 due to RCCA. Renal function normal range in August 2018.     She had Prevnar in 2015 and initial Pneumovax 2009.  Now that she is age 63, we will give Pneumovax booster.      Review of Systems   Constitutional: Negative for chills, fatigue and fever.   HENT: Negative for congestion, ear pain, postnasal drip, sinus pressure and sore throat.   "  Respiratory: Negative for cough, shortness of breath and wheezing.    Cardiovascular: Negative for chest pain, palpitations and leg swelling.   Gastrointestinal: Negative for abdominal pain, blood in stool, constipation, diarrhea, nausea and vomiting.   Endocrine: Negative for cold intolerance, heat intolerance, polydipsia and polyuria.   Genitourinary: Negative for dysuria, frequency, hematuria and urgency.   Skin: Negative for rash.   Neurological: Negative for syncope and weakness.        Objective      Vitals:    11/14/18 0900   BP: (!) 158/110   Pulse: 80   Temp: 98.6 °F (37 °C)   TempSrc: Oral   Weight: 94.5 kg (208 lb 6.4 oz)   Height: 162.6 cm (64\")         Physical Exam   Constitutional: She is oriented to person, place, and time. She appears well-developed and well-nourished. No distress.   Obese female.    HENT:   Head: Normocephalic and atraumatic.   Nose: Nose normal.   Mouth/Throat: Oropharynx is clear and moist. No oropharyngeal exudate.   Eyes: EOM are normal. Pupils are equal, round, and reactive to light.   Neck: Neck supple. No JVD present. No thyromegaly present.   Cardiovascular: Normal rate, regular rhythm and normal heart sounds.   Pulmonary/Chest: Effort normal and breath sounds normal. No accessory muscle usage. No respiratory distress. She has no wheezes. She has no rales.   Abdominal: Soft. Bowel sounds are normal. She exhibits no distension. There is no tenderness.   Obese abdomen   Musculoskeletal: She exhibits no edema.   Lymphadenopathy:     She has no cervical adenopathy.   Neurological: She is alert and oriented to person, place, and time. No cranial nerve deficit.   Psychiatric: She has a normal mood and affect. Her speech is normal and behavior is normal. Judgment and thought content normal.       Future Appointments   Date Time Provider Department Center   2/19/2019  9:30 AM Michael Quiñonez MD [facility] None       Assessment/Plan      Increase Coreg to 12.5 mg b.i.d.  Continue " with the lisinopril 20 mg daily.  Continue the spironolactone and potassium supplement.  She is going to purchase a blood pressure cuff today.  Recommended she monitor blood pressure with a goal of keeping systolic blood pressure less than 140 and diastolic less than 90.  We discussed the benefits of weight loss.  Intensify diet, exercise, and weight loss efforts.    She is given Pneumovax today.      I recommended she gradually increase her dose of Ozempic from 0.25 mg to 0.50 to help prevent GI side effects.  She is to take the maximum tolerable dose of Ozempic.  Reduce portion sizes.  She has been unable to tolerate metformin or SGLT-2 agents.    We will continue to monitor her blood counts.  She will continue to keep close follow-up with Dr. Forte every 3 months due to her CLL.    We continue to monitor her history of right renal cell carcinoma in 2003.  There is no evidence of recurrence.  There is been no evidence of aldosterone secreting adrenal adenomas.    Return in February for routine follow up visit or sooner if blood pressure is consistently above goal.     Scribed for Dr. Quiñonez by Catherine Maldonado [facility].     Diagnoses and all orders for this visit:    Uncontrolled hypertension    Need for prophylactic vaccination against Streptococcus pneumoniae (pneumococcus)    Essential hypertension    Class 2 severe obesity due to excess calories with serious comorbidity and body mass index (BMI) of 35.0 to 35.9 in adult (CMS/[facility])    Type 2 diabetes mellitus without complication, without long-term current use of insulin (CMS/[facility])    Primary malignant neoplasm of right kidney - R nephrectomy 2003    Chronic lymphatic leukemia (CMS/HCC)    Other orders  -     carvedilol (COREG) 12.5 MG tablet; Take 1 tablet by mouth 2 (Two) Times a Day.  -     Pneumococcal Polysaccharide Vaccine 23-Valent Greater Than or Equal To 1yo Subcutaneous / IM        No visits with results within 3 Week(s) from this visit.   Latest  known visit with results is:   Office Visit on 09/24/2018   Component Date Value Ref Range Status   • Case Report 09/24/2018    Final                    Value:Gynecologic Cytology Report                       Case: [id number]                                  Authorizing Provider:  Priti Hernandez    Collected:           09/24/2018 09:45 AM                                 ISMAEL Pizarro                                                                  Ordering Location:     [facility]     Received:            09/24/2018 01:48 PM                                 [facility]                                                               First Screen:          Hortencia Olson                                                          Specimen:    Liquid-Based Pap, Screening, Vagina                                                       • Interpretation 09/24/2018 Negative for intraepithelial lesion or malignancy    Final   • Other Findings 09/24/2018 Atrophy with inflammation   Final   • Specimen Adequacy 09/24/2018 Satisfactory for evaluation   Final   • Additional Information 09/24/2018    Final                    Value:This result contains rich text formatting which cannot be displayed here.   ]

## 2019-02-13 ENCOUNTER — LAB (OUTPATIENT)
Dept: LAB | Facility: OTHER | Age: 64
End: 2019-02-13

## 2019-02-13 DIAGNOSIS — E11.9 TYPE 2 DIABETES MELLITUS WITHOUT COMPLICATION, WITHOUT LONG-TERM CURRENT USE OF INSULIN (HCC): Chronic | ICD-10-CM

## 2019-02-13 DIAGNOSIS — D50.0 IRON DEFICIENCY ANEMIA DUE TO CHRONIC BLOOD LOSS: Chronic | ICD-10-CM

## 2019-02-13 DIAGNOSIS — E55.9 VITAMIN D DEFICIENCY: Chronic | ICD-10-CM

## 2019-02-13 DIAGNOSIS — I10 ESSENTIAL HYPERTENSION: ICD-10-CM

## 2019-02-13 DIAGNOSIS — D51.3 OTHER DIETARY VITAMIN B12 DEFICIENCY ANEMIA: Chronic | ICD-10-CM

## 2019-02-13 DIAGNOSIS — E78.2 MIXED HYPERLIPIDEMIA: Chronic | ICD-10-CM

## 2019-02-13 LAB
25(OH)D3 SERPL-MCNC: 48.6 NG/ML (ref 30–100)
ALBUMIN SERPL-MCNC: 4.5 G/DL (ref 3.5–5)
ALBUMIN/GLOB SERPL: 1.7 G/DL (ref 1.1–1.8)
ALP SERPL-CCNC: 100 U/L (ref 38–126)
ALT SERPL W P-5'-P-CCNC: 18 U/L
ANION GAP SERPL CALCULATED.3IONS-SCNC: 7 MMOL/L (ref 5–15)
ANISOCYTOSIS BLD QL: ABNORMAL
ARTICHOKE IGE QN: 85 MG/DL (ref 1–129)
AST SERPL-CCNC: 21 U/L (ref 14–36)
BASOPHILS # BLD MANUAL: 0.08 10*3/MM3 (ref 0–0.2)
BASOPHILS NFR BLD AUTO: 1 % (ref 0–2)
BILIRUB SERPL-MCNC: 2.7 MG/DL (ref 0.2–1.3)
BUN BLD-MCNC: 16 MG/DL (ref 7–17)
BUN/CREAT SERPL: 14.2 (ref 7–25)
CALCIUM SPEC-SCNC: 9.3 MG/DL (ref 8.4–10.2)
CHLORIDE SERPL-SCNC: 105 MMOL/L (ref 98–107)
CO2 SERPL-SCNC: 31 MMOL/L (ref 22–30)
CREAT BLD-MCNC: 1.13 MG/DL (ref 0.52–1.04)
DEPRECATED RDW RBC AUTO: 50.6 FL (ref 36.4–46.3)
ERYTHROCYTE [DISTWIDTH] IN BLOOD BY AUTOMATED COUNT: 15.2 % (ref 11.5–14.5)
FERRITIN SERPL-MCNC: 25.8 NG/ML (ref 11.1–264)
GFR SERPL CREATININE-BSD FRML MDRD: 48 ML/MIN/1.73 (ref 45–104)
GLOBULIN UR ELPH-MCNC: 2.7 GM/DL (ref 2.3–3.5)
GLUCOSE BLD-MCNC: 99 MG/DL (ref 74–99)
HBA1C MFR BLD: 5.8 % (ref 4–5.6)
HCT VFR BLD AUTO: 44.1 % (ref 35–45)
HGB BLD-MCNC: 14.2 G/DL (ref 12–15.5)
LYMPHOCYTES # BLD MANUAL: 4.35 10*3/MM3 (ref 0.6–4.2)
LYMPHOCYTES NFR BLD MANUAL: 57 % (ref 10–50)
LYMPHOCYTES NFR BLD MANUAL: 8 % (ref 0–12)
MCH RBC QN AUTO: 30.3 PG (ref 26.5–34)
MCHC RBC AUTO-ENTMCNC: 32.2 G/DL (ref 31.4–36)
MCV RBC AUTO: 94 FL (ref 80–98)
MONOCYTES # BLD AUTO: 0.61 10*3/MM3 (ref 0–0.9)
NEUTROPHILS # BLD AUTO: 2.59 10*3/MM3 (ref 2–8.6)
NEUTROPHILS NFR BLD MANUAL: 34 % (ref 37–80)
PLATELET # BLD AUTO: 111 10*3/MM3 (ref 150–450)
PMV BLD AUTO: 9.9 FL (ref 8–12)
POTASSIUM BLD-SCNC: 4.7 MMOL/L (ref 3.4–5)
PROT SERPL-MCNC: 7.2 G/DL (ref 6.3–8.2)
RBC # BLD AUTO: 4.69 10*6/MM3 (ref 3.77–5.16)
SCAN SLIDE: NORMAL
SMALL PLATELETS BLD QL SMEAR: ABNORMAL
SODIUM BLD-SCNC: 143 MMOL/L (ref 137–145)
VIT B12 BLD-MCNC: 688 PG/ML (ref 239–931)
WBC MORPH BLD: NORMAL
WBC NRBC COR # BLD: 7.63 10*3/MM3 (ref 3.2–9.8)

## 2019-02-13 PROCEDURE — 82306 VITAMIN D 25 HYDROXY: CPT | Performed by: INTERNAL MEDICINE

## 2019-02-13 PROCEDURE — 85025 COMPLETE CBC W/AUTO DIFF WBC: CPT | Performed by: INTERNAL MEDICINE

## 2019-02-13 PROCEDURE — 82728 ASSAY OF FERRITIN: CPT | Performed by: INTERNAL MEDICINE

## 2019-02-13 PROCEDURE — 36415 COLL VENOUS BLD VENIPUNCTURE: CPT | Performed by: INTERNAL MEDICINE

## 2019-02-13 PROCEDURE — 82607 VITAMIN B-12: CPT | Performed by: INTERNAL MEDICINE

## 2019-02-13 PROCEDURE — 83721 ASSAY OF BLOOD LIPOPROTEIN: CPT | Performed by: INTERNAL MEDICINE

## 2019-02-13 PROCEDURE — 83036 HEMOGLOBIN GLYCOSYLATED A1C: CPT | Performed by: INTERNAL MEDICINE

## 2019-02-13 PROCEDURE — 80053 COMPREHEN METABOLIC PANEL: CPT | Performed by: INTERNAL MEDICINE

## 2019-02-20 ENCOUNTER — OFFICE VISIT (OUTPATIENT)
Dept: FAMILY MEDICINE CLINIC | Facility: CLINIC | Age: 64
End: 2019-02-20

## 2019-02-20 VITALS
HEIGHT: 64 IN | TEMPERATURE: 98.6 F | WEIGHT: 206.6 LBS | BODY MASS INDEX: 35.27 KG/M2 | HEART RATE: 80 BPM | SYSTOLIC BLOOD PRESSURE: 110 MMHG | DIASTOLIC BLOOD PRESSURE: 80 MMHG

## 2019-02-20 DIAGNOSIS — E66.01 CLASS 2 SEVERE OBESITY DUE TO EXCESS CALORIES WITH SERIOUS COMORBIDITY AND BODY MASS INDEX (BMI) OF 35.0 TO 35.9 IN ADULT (HCC): Chronic | ICD-10-CM

## 2019-02-20 DIAGNOSIS — Z00.00 MEDICARE ANNUAL WELLNESS VISIT, INITIAL: Primary | ICD-10-CM

## 2019-02-20 DIAGNOSIS — E83.42 HYPOMAGNESEMIA: Chronic | ICD-10-CM

## 2019-02-20 DIAGNOSIS — C64.1: Chronic | ICD-10-CM

## 2019-02-20 DIAGNOSIS — E78.2 MIXED HYPERLIPIDEMIA: Chronic | ICD-10-CM

## 2019-02-20 DIAGNOSIS — I83.93 VARICOSE VEINS OF BOTH LOWER EXTREMITIES, UNSPECIFIED WHETHER COMPLICATED: Chronic | ICD-10-CM

## 2019-02-20 DIAGNOSIS — D51.3 OTHER DIETARY VITAMIN B12 DEFICIENCY ANEMIA: Chronic | ICD-10-CM

## 2019-02-20 DIAGNOSIS — C91.10 CHRONIC LYMPHATIC LEUKEMIA (HCC): Chronic | ICD-10-CM

## 2019-02-20 DIAGNOSIS — E55.9 VITAMIN D DEFICIENCY: Chronic | ICD-10-CM

## 2019-02-20 DIAGNOSIS — F33.42 RECURRENT MAJOR DEPRESSIVE DISORDER, IN FULL REMISSION (HCC): Chronic | ICD-10-CM

## 2019-02-20 DIAGNOSIS — K29.50 CHRONIC GASTRITIS WITHOUT BLEEDING, UNSPECIFIED GASTRITIS TYPE: Chronic | ICD-10-CM

## 2019-02-20 DIAGNOSIS — E87.6 HYPOKALEMIA: Chronic | ICD-10-CM

## 2019-02-20 DIAGNOSIS — F41.1 GENERALIZED ANXIETY DISORDER: Chronic | ICD-10-CM

## 2019-02-20 DIAGNOSIS — I10 ESSENTIAL HYPERTENSION: Chronic | ICD-10-CM

## 2019-02-20 DIAGNOSIS — G47.9 SLEEP DISORDER: Chronic | ICD-10-CM

## 2019-02-20 DIAGNOSIS — E11.9 TYPE 2 DIABETES MELLITUS WITHOUT COMPLICATION, WITHOUT LONG-TERM CURRENT USE OF INSULIN (HCC): Chronic | ICD-10-CM

## 2019-02-20 DIAGNOSIS — D50.0 IRON DEFICIENCY ANEMIA DUE TO CHRONIC BLOOD LOSS: Chronic | ICD-10-CM

## 2019-02-20 PROCEDURE — G0438 PPPS, INITIAL VISIT: HCPCS | Performed by: INTERNAL MEDICINE

## 2019-02-20 PROCEDURE — 99214 OFFICE O/P EST MOD 30 MIN: CPT | Performed by: INTERNAL MEDICINE

## 2019-02-20 RX ORDER — SPIRONOLACTONE 25 MG/1
25 TABLET ORAL 2 TIMES DAILY
Qty: 180 TABLET | Refills: 3 | Status: SHIPPED | OUTPATIENT
Start: 2019-02-20 | End: 2019-08-22 | Stop reason: SDUPTHER

## 2019-02-20 RX ORDER — CARVEDILOL 12.5 MG/1
12.5 TABLET ORAL 2 TIMES DAILY
Qty: 180 TABLET | Refills: 3 | Status: SHIPPED | OUTPATIENT
Start: 2019-02-20 | End: 2019-08-22 | Stop reason: SDUPTHER

## 2019-02-20 RX ORDER — ALPRAZOLAM 0.5 MG/1
.5-1 TABLET ORAL NIGHTLY PRN
Qty: 60 TABLET | Refills: 2 | Status: SHIPPED | OUTPATIENT
Start: 2019-02-20 | End: 2019-08-22 | Stop reason: SDUPTHER

## 2019-02-20 RX ORDER — FLUOXETINE 10 MG/1
30 CAPSULE ORAL EVERY MORNING
Qty: 270 CAPSULE | Refills: 3 | Status: SHIPPED | OUTPATIENT
Start: 2019-02-20 | End: 2019-08-22 | Stop reason: SDUPTHER

## 2019-02-20 RX ORDER — LISINOPRIL 20 MG/1
20 TABLET ORAL DAILY
Qty: 90 TABLET | Refills: 3 | Status: SHIPPED | OUTPATIENT
Start: 2019-02-20 | End: 2019-08-22 | Stop reason: SDUPTHER

## 2019-02-20 RX ORDER — ATORVASTATIN CALCIUM 40 MG/1
40 TABLET, FILM COATED ORAL DAILY
Qty: 90 TABLET | Refills: 3 | Status: SHIPPED | OUTPATIENT
Start: 2019-02-20 | End: 2019-08-22 | Stop reason: SDUPTHER

## 2019-02-20 NOTE — PROGRESS NOTES
Subjective        History of Present Illness     Chio Mary is a 63 y.o. female who presents for six month follow up on multiple medical issues including diabetes, hypertension, high cholesterol, CLL, vitamin D deficiency, and obesity among other issues. She has history of right nephrectomy by Dr. Franke in 2003 due to RCCA with no recurrence.  Dr. Soriano in [place] follows her CLL every three months.     She reports adequate control of anxiety on her current combination of Xanax and fluoxetine with no tolerability issues.   She denies significant side effects including excessive daytime sedation.     DEXA 11/2016 revealed normal bone density.      Diabetes management is improved.  We added weekly Ozempic to her diabetic regimen in October.  She is taking just slightly less than 0.5 mg, as she reports nausea with the 0.5 mg dose.  Diabetes remained at goal despite patient reporting a steroid injection in her right knee by Dr. Rodriguez in January      She is up-to-date on colonoscopy.  Colonoscopy 04/2018 revealed diverticulosis in the sigmoid colon as well as one 5 mm polyp in the ascending colon and medium-sized lipoma in the transverse colon.     Weight is down 3 pounds in the past six months.   She has a treadmill at home and reports now that her knee pain is improved with the recent injection, she plans to start walking on the treadmill.   I increased her dose of Coreg to 12.5 mg b.i.d. and she continues on lisinopril. She has been monitoring BP at home and reports BP has fluctuated with systolic normally below 140 and diastolic under 90 the majority of the time with rare elevations above 90.  BP at goal today at 110/80.      The patient's relevant past medical, surgical, and social history was reviewed in Epic.   Lab results are reviewed with the patient today.  CBC unremarkable.   Fasting glucose 99. A1c is 5.8.  Renal and liver function normal.   Vitamin D and vitamin B-12 levels at goal with oral  "supplements.     Review of Systems   Constitutional: Negative for chills, fatigue and fever.   HENT: Negative for congestion, ear pain, postnasal drip, sinus pressure and sore throat.    Respiratory: Negative for cough, shortness of breath and wheezing.    Cardiovascular: Negative for chest pain, palpitations and leg swelling.   Gastrointestinal: Negative for abdominal pain, blood in stool, constipation, diarrhea, nausea and vomiting.   Endocrine: Negative for cold intolerance, heat intolerance, polydipsia and polyuria.   Genitourinary: Negative for dysuria, frequency, hematuria and urgency.   Skin: Negative for rash.   Neurological: Negative for syncope and weakness.            Objective     Vitals:    02/20/19 0935   BP: 110/80   Pulse: 80   Temp: 98.6 °F (37 °C)   TempSrc: Oral   Weight: 93.7 kg (206 lb 9.6 oz)   Height: 162.6 cm (64\")     Physical Exam   Constitutional: She is oriented to person, place, and time. She appears well-developed and well-nourished. No distress.   Very pleasant female.    HENT:   Head: Normocephalic and atraumatic.   Nose: Right sinus exhibits no maxillary sinus tenderness and no frontal sinus tenderness. Left sinus exhibits no maxillary sinus tenderness and no frontal sinus tenderness.   Mouth/Throat: Uvula is midline, oropharynx is clear and moist and mucous membranes are normal. No oral lesions. No tonsillar exudate.   Eyes: Conjunctivae and EOM are normal. Pupils are equal, round, and reactive to light.   Neck: Trachea normal. Neck supple. No JVD present. Carotid bruit is not present. No tracheal deviation present. No thyroid mass and no thyromegaly present.   Cardiovascular: Normal rate, regular rhythm, normal heart sounds and intact distal pulses.  No extrasystoles are present. PMI is not displaced.   No murmur heard.  Pulmonary/Chest: Effort normal and breath sounds normal. No accessory muscle usage. No respiratory distress. She has no decreased breath sounds. She has no " wheezes. She has no rhonchi. She has no rales.   Abdominal: Soft. Bowel sounds are normal. She exhibits no distension. There is no hepatosplenomegaly. There is no tenderness.   Obese abdomen limits exam.      Vascular Status -  Her right foot exhibits abnormal foot vasculature  (Multiple varicosities bilateral lower extremities. ). Her right foot exhibits no edema. Her left foot exhibits abnormal foot vasculature . Her left foot exhibits no edema.  Lymphadenopathy:     She has no cervical adenopathy.   Neurological: She is alert and oriented to person, place, and time. No cranial nerve deficit. Coordination normal.   Skin: Skin is warm, dry and intact. No rash noted. No cyanosis. Nails show no clubbing.   Psychiatric: She has a normal mood and affect. Her speech is normal and behavior is normal. Judgment and thought content normal.   Vitals reviewed.        Assessment/Plan      Continue to follow with [facility] for history of CLL every three months.     Continue Lipitor and dietary efforts.     Continue vitamin D and vitamin B-12 supplements.      Continue Prilosec for GERD.    Continue Xanax and fluoxetine.  A refill is given for Xanax 0.5 mg to take 1-2 tablets by mouth At Night As Needed for Sleep.  I also suggested she continue to try dosing adjustments/reductions regularly.  Patient understands the risks associated with this controlled medication, including tolerance and addiction.  She also agrees to only obtain this medication from me, and not from a another provider, unless that provider is covering for me in my absence.  She also agrees to be compliant in dosing, and not self adjust the dose of medication.  A signed controlled substance agreement is on file, and she has received a controlled substance education sheet at this a previous visit.  She has also signed a consent for treatment with a controlled substance as per [facility] policy. MURALI was obtained.    Continue with the  weekly Ozempic injections.  Pursue and diabetic diet, exercise, and weight loss goals.  I recommended walking on the treadmill gradually increasing her time.  She feels like the knee pain     Scribed for Dr. Quiñonez by Catherine Maldonado [facility].     Diagnoses and all orders for this visit:    Medicare annual wellness visit, initial    Essential hypertension    Mixed hyperlipidemia  -     Lipid Panel; Future    Varicose veins of both lower extremities, unspecified whether complicated    Chronic gastritis without bleeding, unspecified gastritis type    Type 2 diabetes mellitus without complication, without long-term current use of insulin (CMS/[facility])  -     CBC Auto Differential; Future  -     Comprehensive Metabolic Panel; Future  -     Hemoglobin A1c; Future  -     Lipid Panel; Future  -     Microalbumin / Creatinine Urine Ratio - Urine, Clean Catch; Future  -     TSH; Future    Vitamin D deficiency  -     Vitamin D 25 Hydroxy; Future    Class 2 severe obesity due to excess calories with serious comorbidity and body mass index (BMI) of 35.0 to 35.9 in adult (CMS/[facility])    Primary malignant neoplasm of right kidney - R nephrectomy 2003    Chronic lymphatic leukemia (CMS/[facility])    Iron deficiency anemia secondary to blood loss (chronic)    Other dietary vitamin B12 deficiency anemia  -     Vitamin B12; Future    Generalized anxiety disorder    Hypokalemia    Hypomagnesemia    Recurrent major depressive disorder, in full remission (CMS/[facility])    Sleep disorder    Other orders  -     lisinopril (PRINIVIL,ZESTRIL) 20 MG tablet; Take 1 tablet by mouth Daily. For blood pressure  -     carvedilol (COREG) 12.5 MG tablet; Take 1 tablet by mouth 2 (Two) Times a Day.  -     FLUoxetine (PROzac) 10 MG capsule; Take 3 capsules by mouth Every Morning.  -     atorvastatin (LIPITOR) 40 MG tablet; Take 1 tablet by mouth Daily.  -     spironolactone (ALDACTONE) 25 MG tablet; Take 1 tablet by mouth 2 (Two) Times a Day.  -     Semaglutide (OZEMPIC)  0.25 or 0.5 MG/DOSE solution pen-injector; Inject 0.5 mg under the skin into the appropriate area as directed 1 (One) Time Per Week.  -     ALPRAZolam (XANAX) 0.5 MG tablet; Take 1-2 tablets by mouth At Night As Needed for Sleep.        Lab on 02/13/2019   Component Date Value Ref Range Status   • WBC 02/13/2019 7.63  3.20 - 9.80 10*3/mm3 Final   • RBC 02/13/2019 4.69  3.77 - 5.16 10*6/mm3 Final   • Hemoglobin 02/13/2019 14.2  12.0 - 15.5 g/dL Final   • Hematocrit 02/13/2019 44.1  35.0 - 45.0 % Final   • MCV 02/13/2019 94.0  80.0 - 98.0 fL Final   • MCH 02/13/2019 30.3  26.5 - 34.0 pg Final   • MCHC 02/13/2019 32.2  31.4 - 36.0 g/dL Final   • RDW 02/13/2019 15.2* 11.5 - 14.5 % Final   • RDW-SD 02/13/2019 50.6* 36.4 - 46.3 fl Final   • MPV 02/13/2019 9.9  8.0 - 12.0 fL Final   • Platelets 02/13/2019 111* 150 - 450 10*3/mm3 Final   • Glucose 02/13/2019 99  74 - 99 mg/dL Final   • BUN 02/13/2019 16  7 - 17 mg/dL Final   • Creatinine 02/13/2019 1.13* 0.52 - 1.04 mg/dL Final   • Sodium 02/13/2019 143  137 - 145 mmol/L Final   • Potassium 02/13/2019 4.7  3.4 - 5.0 mmol/L Final   • Chloride 02/13/2019 105  98 - 107 mmol/L Final   • CO2 02/13/2019 31.0* 22.0 - 30.0 mmol/L Final   • Calcium 02/13/2019 9.3  8.4 - 10.2 mg/dL Final   • Total Protein 02/13/2019 7.2  6.3 - 8.2 g/dL Final   • Albumin 02/13/2019 4.50  3.50 - 5.00 g/dL Final   • ALT (SGPT) 02/13/2019 18  <=35 U/L Final   • AST (SGOT) 02/13/2019 21  14 - 36 U/L Final   • Alkaline Phosphatase 02/13/2019 100  38 - 126 U/L Final   • Total Bilirubin 02/13/2019 2.7* 0.2 - 1.3 mg/dL Final   • eGFR Non African Amer 02/13/2019 48  45 - 104 mL/min/1.73 Final   • Globulin 02/13/2019 2.7  2.3 - 3.5 gm/dL Final   • A/G Ratio 02/13/2019 1.7  1.1 - 1.8 g/dL Final   • BUN/Creatinine Ratio 02/13/2019 14.2  7.0 - 25.0 Final   • Anion Gap 02/13/2019 7.0  5.0 - 15.0 mmol/L Final   • Hemoglobin A1C 02/13/2019 5.8* 4 - 5.6 % Final   • LDL Cholesterol  02/13/2019 85  1 - 129 mg/dL Final    • Ferritin 02/13/2019 25.80  11.10 - 264.00 ng/mL Final   • 25 Hydroxy, Vitamin D 02/13/2019 48.6  30.0 - 100.0 ng/ml Final   • Vitamin B-12 02/13/2019 688  239 - 931 pg/mL Final   • Scan Slide 02/13/2019    Final    See Manual Differential Results   • Neutrophil % 02/13/2019 34.0* 37.0 - 80.0 % Final   • Lymphocyte % 02/13/2019 57.0* 10.0 - 50.0 % Final   • Monocyte % 02/13/2019 8.0  0.0 - 12.0 % Final   • Basophil % 02/13/2019 1.0  0.0 - 2.0 % Final   • Neutrophils Absolute 02/13/2019 2.59  2.00 - 8.60 10*3/mm3 Final   • Lymphocytes Absolute 02/13/2019 4.35* 0.60 - 4.20 10*3/mm3 Final   • Monocytes Absolute 02/13/2019 0.61  0.00 - 0.90 10*3/mm3 Final   • Basophils Absolute 02/13/2019 0.08  0.00 - 0.20 10*3/mm3 Final   • Anisocytosis 02/13/2019 Slight/1+  None Seen Final   • WBC Morphology 02/13/2019 Normal  Normal Final   • Platelet Estimate 02/13/2019 Decreased  Normal Final   ]

## 2019-02-20 NOTE — PATIENT INSTRUCTIONS
Medicare Wellness  Personal Prevention Plan of Service     Date of Office Visit:  2019  Encounter Provider:  Michael Quiñonez MD  Place of Service:  [facility]  Patient Name: Chio Mary  :  1955    As part of the Medicare Wellness portion of your visit today, we are providing you with this personalized preventive plan of services (PPPS). This plan is based upon recommendations of the [place] Preventive Services Task Force (USPSTF) and the Advisory Committee on Immunization Practices (ACIP).    This lists the preventive care services that should be considered, and provides dates of when you are due. Items listed as completed are up-to-date and do not require any further intervention.    Health Maintenance   Topic Date Due   • TDAP/TD VACCINES (1 - Tdap) 1972   • ZOSTER VACCINE (1 of 2) 2003   • HEPATITIS C SCREENING  2016   • MEDICARE ANNUAL WELLNESS  2016   • DIABETIC FOOT EXAM  2016   • DXA SCAN  2018   • URINE MICROALBUMIN  2019   • HEMOGLOBIN A1C  2019   • DIABETIC EYE EXAM  2019   • LIPID PANEL  2020   • MAMMOGRAM  2020   • PAP SMEAR  2021   • COLONOSCOPY  2028   • INFLUENZA VACCINE  Completed   • PNEUMOCOCCAL VACCINES (65+ LOW/MEDIUM RISK)  Completed       No orders of the defined types were placed in this encounter.      Return in about 6 months (around 2019) for Next scheduled follow up.

## 2019-02-20 NOTE — PROGRESS NOTES
QUICK REFERENCE INFORMATION:  The ABCs of the Annual Wellness Visit    Initial Medicare Wellness Visit    HEALTH RISK ASSESSMENT    1955    Recent Hospitalizations:  No hospitalization(s) within the last year..        Current Medical Providers:  Patient Care Team:  Michael Quiñonez MD as PCP - General Hernandez, ISMAEL Cordova as Gynecologist (Nurse Practitioner)        Smoking Status:  Social History     Tobacco Use   Smoking Status Former Smoker   • Last attempt to quit:    • Years since quittin.1   Smokeless Tobacco Never Used       Alcohol Consumption:  Social History     Substance and Sexual Activity   Alcohol Use No       Depression Screen:   PHQ-2/PHQ-9 Depression Screening 2019   Little interest or pleasure in doing things 0   Feeling down, depressed, or hopeless 0   Trouble falling or staying asleep, or sleeping too much 0   Feeling tired or having little energy 0   Poor appetite or overeating 0   Feeling bad about yourself - or that you are a failure or have let yourself or your family down 0   Trouble concentrating on things, such as reading the newspaper or watching television 0   Moving or speaking so slowly that other people could have noticed. Or the opposite - being so fidgety or restless that you have been moving around a lot more than usual 0   Thoughts that you would be better off dead, or of hurting yourself in some way 0   Total Score 0   If you checked off any problems, how difficult have these problems made it for you to do your work, take care of things at home, or get along with other people? -       Health Habits and Functional and Cognitive Screening:  Functional & Cognitive Status 2019   Do you have difficulty preparing food and eating? No   Do you have difficulty bathing yourself, getting dressed or grooming yourself? No   Do you have difficulty using the toilet? No   Do you have difficulty moving around from place to place? No   Do you have trouble with  steps or getting out of a bed or a chair? No   In the past year have you fallen or experienced a near fall? No   Current Diet Unhealthy Diet   Dental Exam Up to date   Eye Exam Up to date   Exercise (times per week) 7 times per week   Current Exercise Activities Include Housecleaning   Do you need help using the phone?  No   Are you deaf or do you have serious difficulty hearing?  No   Do you need help with transportation? No   Do you need help shopping? No   Do you need help preparing meals?  No   Do you need help with housework?  No   Do you need help with laundry? No   Do you need help taking your medications? No   Do you need help managing money? No   Do you ever drive or ride in a car without wearing a seat belt? No   Have you felt unusual stress, anger or loneliness in the last month? No   Who do you live with? Alone   If you need help, do you have trouble finding someone available to you? No   Have you been bothered in the last four weeks by sexual problems? No   Do you have difficulty concentrating, remembering or making decisions? No           Does the patient have evidence of cognitive impairment? No    Asiprin use counseling: Taking ASA appropriately as indicated      Recent Lab Results:    Visual Acuity:  No exam data present    Age-appropriate Screening Schedule:  Refer to the list below for future screening recommendations based on patient's age, sex and/or medical conditions. Orders for these recommended tests are listed in the plan section. The patient has been provided with a written plan.    Health Maintenance   Topic Date Due   • TDAP/TD VACCINES (1 - Tdap) 09/26/1972   • ZOSTER VACCINE (1 of 2) 09/26/2003   • DIABETIC FOOT EXAM  11/01/2016   • DXA SCAN  11/14/2018   • URINE MICROALBUMIN  02/05/2019   • HEMOGLOBIN A1C  08/13/2019   • DIABETIC EYE EXAM  12/08/2019   • LIPID PANEL  02/13/2020   • MAMMOGRAM  06/19/2020   • PAP SMEAR  09/24/2021   • COLONOSCOPY  04/06/2028   • INFLUENZA VACCINE   Completed   • PNEUMOCOCCAL VACCINES (65+ LOW/MEDIUM RISK)  Completed        Subjective   History of Present Illness    Chio Mary is a 63 y.o. female who presents for an Annual Wellness Visit.    The following portions of the patient's history were reviewed and updated as appropriate:   She  has a past medical history of Abnormal liver function, Anxiety, Blood in feces, Chronic lymphatic leukemia (CMS/HCC), Degenerative joint disease involving multiple joints, Depressive disorder, Epigastric pain, Essential hypertension, Gastritis, Generalized anxiety disorder, H/O bone density study (11/11/2014), History of colon polyps, History of transfusion, Hypokalemia, Hypomagnesemia, Iron deficiency anemia secondary to blood loss (chronic), Migraine, Obesity, Osteopenia, Other dietary vitamin B12 deficiency anemia (8/14/2018), Primary malignant neoplasm of kidney (CMS/HCC), Primary malignant neoplasm of right kidney - R nephrectomy 2003, Prolapsed thoracic intervertebral disc, Pure hypercholesterolemia, Sleep disorder, Tobacco use, Tubular adenoma, Type 2 diabetes mellitus (CMS/HCC), Varicose veins of both lower extremities (8/14/2018), Vitamin D deficiency, and Yeast infection.  She does not have any pertinent problems on file.  She  has a past surgical history that includes inguinal hernia repair laparoscopic and umbilical hernia repair (Right, 01/2004); Cholecystectomy; Colonoscopy w/ polypectomy (02/13/2015); Colonoscopy (03/22/2010); Injection of Medication (06/23/2014); Esophagogastroduodenoscopy w/ PEG (02/13/2015); Other surgical history (03/18/2013); Injection of Medication (03/24/2015); Back surgery; Nephrectomy (Right, 2003); Varicose vein surgery (1990); Injection of Medication (06/23/2014); Other surgical history (10/23/2013); Total hip arthroplasty; Joint replacement; Total abdominal hysterectomy w/ bilateral salpingoophorectomy (90's); Ovarian cyst surgery; and COLONOSCOPY (N/A, 4/6/2018).  Her family  history includes Breast cancer in her sister; Cancer in her other; Colon cancer in her other; Pancreatic cancer in her mother; Thyroid disease in her other.  She  reports that she quit smoking about 16 years ago. she has never used smokeless tobacco. She reports that she does not drink alcohol or use drugs.  Current Outpatient Medications   Medication Sig Dispense Refill   • ALPRAZolam (XANAX) 0.5 MG tablet Take 1-2 tablets by mouth At Night As Needed for Sleep. 60 tablet 2   • aspirin 81 MG EC tablet Take 81 mg by mouth Daily.     • atorvastatin (LIPITOR) 40 MG tablet Take 1 tablet by mouth Daily. 90 tablet 3   • carvedilol (COREG) 12.5 MG tablet Take 1 tablet by mouth 2 (Two) Times a Day. 180 tablet 3   • cholecalciferol (VITAMIN D3) 1000 UNITS tablet Take 1,000 Units by mouth Daily.     • FLUoxetine (PROzac) 10 MG capsule Take 3 capsules by mouth Every Morning. 270 capsule 3   • glucose blood (ONETOUCH VERIO) test strip Check glucose daily  E11.9 100 each 3   • Lancets 30G misc Check one time daily E11.9 For Verio glucometer 100 each 3   • lisinopril (PRINIVIL,ZESTRIL) 20 MG tablet Take 1 tablet by mouth Daily. For blood pressure 90 tablet 3   • loratadine (CLARITIN) 10 MG tablet Take 10 mg by mouth Daily.     • omeprazole (priLOSEC) 40 MG capsule Take 20 mg by mouth Every Morning.     • POTASSIUM CHLORIDE ER PO Take 100 mEq/day by mouth Daily.     • Semaglutide (OZEMPIC) 0.25 or 0.5 MG/DOSE solution pen-injector Inject 0.5 mg under the skin into the appropriate area as directed 1 (One) Time Per Week. 3 pen 3   • spironolactone (ALDACTONE) 25 MG tablet Take 1 tablet by mouth 2 (Two) Times a Day. 180 tablet 3   • triamcinolone (KENALOG) 0.1 % ointment Apply  topically to the appropriate area as directed Every Night. 30 g 0   • vitamin B-12 (CYANOCOBALAMIN) 500 MCG tablet Take 2,500 mcg by mouth Every Other Day.     • vitamin C (ASCORBIC ACID) 500 MG tablet Take 500 mg by mouth Daily.       No current  facility-administered medications for this visit.      Current Outpatient Medications on File Prior to Visit   Medication Sig   • aspirin 81 MG EC tablet Take 81 mg by mouth Daily.   • cholecalciferol (VITAMIN D3) 1000 UNITS tablet Take 1,000 Units by mouth Daily.   • glucose blood (ONETOUCH VERIO) test strip Check glucose daily  E11.9   • Lancets 30G misc Check one time daily E11.9 For Verio glucometer   • loratadine (CLARITIN) 10 MG tablet Take 10 mg by mouth Daily.   • omeprazole (priLOSEC) 40 MG capsule Take 20 mg by mouth Every Morning.   • POTASSIUM CHLORIDE ER PO Take 100 mEq/day by mouth Daily.   • triamcinolone (KENALOG) 0.1 % ointment Apply  topically to the appropriate area as directed Every Night.   • vitamin B-12 (CYANOCOBALAMIN) 500 MCG tablet Take 2,500 mcg by mouth Every Other Day.   • vitamin C (ASCORBIC ACID) 500 MG tablet Take 500 mg by mouth Daily.   • [DISCONTINUED] ALPRAZolam (XANAX) 0.5 MG tablet Take 1-2 tablets by mouth At Night As Needed for Sleep.   • [DISCONTINUED] atorvastatin (LIPITOR) 40 MG tablet Take 1 tablet by mouth Daily.   • [DISCONTINUED] carvedilol (COREG) 12.5 MG tablet Take 1 tablet by mouth 2 (Two) Times a Day.   • [DISCONTINUED] FLUoxetine (PROzac) 10 MG capsule Take 3 capsules by mouth Every Morning.   • [DISCONTINUED] lisinopril (PRINIVIL,ZESTRIL) 20 MG tablet Take 1 tablet by mouth Daily. For blood pressure   • [DISCONTINUED] Semaglutide (OZEMPIC) 0.25 or 0.5 MG/DOSE solution pen-injector Inject 0.5 mg under the skin into the appropriate area as directed 1 (One) Time Per Week.   • [DISCONTINUED] spironolactone (ALDACTONE) 25 MG tablet Take 1 tablet by mouth 2 (Two) Times a Day.     No current facility-administered medications on file prior to visit.      She is allergic to jardiance [empagliflozin]; metformin; nsaids; and tolmetin..    Outpatient Medications Prior to Visit   Medication Sig Dispense Refill   • aspirin 81 MG EC tablet Take 81 mg by mouth Daily.     •  cholecalciferol (VITAMIN D3) 1000 UNITS tablet Take 1,000 Units by mouth Daily.     • glucose blood (ONETOUCH VERIO) test strip Check glucose daily  E11.9 100 each 3   • Lancets 30G misc Check one time daily E11.9 For Verio glucometer 100 each 3   • loratadine (CLARITIN) 10 MG tablet Take 10 mg by mouth Daily.     • omeprazole (priLOSEC) 40 MG capsule Take 20 mg by mouth Every Morning.     • POTASSIUM CHLORIDE ER PO Take 100 mEq/day by mouth Daily.     • triamcinolone (KENALOG) 0.1 % ointment Apply  topically to the appropriate area as directed Every Night. 30 g 0   • vitamin B-12 (CYANOCOBALAMIN) 500 MCG tablet Take 2,500 mcg by mouth Every Other Day.     • vitamin C (ASCORBIC ACID) 500 MG tablet Take 500 mg by mouth Daily.     • ALPRAZolam (XANAX) 0.5 MG tablet Take 1-2 tablets by mouth At Night As Needed for Sleep. 60 tablet 2   • atorvastatin (LIPITOR) 40 MG tablet Take 1 tablet by mouth Daily. 90 tablet 3   • carvedilol (COREG) 12.5 MG tablet Take 1 tablet by mouth 2 (Two) Times a Day. 180 tablet 3   • FLUoxetine (PROzac) 10 MG capsule Take 3 capsules by mouth Every Morning. 270 capsule 3   • lisinopril (PRINIVIL,ZESTRIL) 20 MG tablet Take 1 tablet by mouth Daily. For blood pressure 90 tablet 3   • Semaglutide (OZEMPIC) 0.25 or 0.5 MG/DOSE solution pen-injector Inject 0.5 mg under the skin into the appropriate area as directed 1 (One) Time Per Week. 3 pen 3   • spironolactone (ALDACTONE) 25 MG tablet Take 1 tablet by mouth 2 (Two) Times a Day. 180 tablet 3     No facility-administered medications prior to visit.        Patient Active Problem List   Diagnosis   • Vitamin D deficiency   • Type 2 diabetes mellitus without complication, without long-term current use of insulin (CMS/HCC)   • Tubular adenoma   • Sleep disorder   • Prolapsed thoracic intervertebral disc   • Primary malignant neoplasm of right kidney - R nephrectomy 2003   • Osteopenia   • Obesity   • Iron deficiency anemia secondary to blood loss  "(chronic)   • Hypomagnesemia   • Hypokalemia   • History of colon polyps   • Generalized anxiety disorder   • Gastritis   • Essential hypertension   • Degenerative joint disease involving multiple joints   • Chronic lymphatic leukemia (CMS/HCC)   • Mixed hyperlipidemia   • Encounter for colonoscopy due to history of adenomatous colonic polyps   • Diverticulosis of large intestine without hemorrhage   • Varicose veins of both lower extremities   • Recurrent major depressive disorder, in full remission (CMS/HCC)   • Other dietary vitamin B12 deficiency anemia       Advance Care Planning:  has an advance directive - a copy HAS NOT been provided. Have asked the patient to send this to us to add to record.    Identification of Risk Factors:  Risk factors include: weight .    Review of Systems    Compared to one year ago, the patient feels her physical health is the same.  Compared to one year ago, the patient feels her mental health is the same.    Objective     Physical Exam    Vitals:    02/20/19 0935   BP: 110/80   Pulse: 80   Temp: 98.6 °F (37 °C)   TempSrc: Oral   Weight: 93.7 kg (206 lb 9.6 oz)   Height: 162.6 cm (64\")   PainSc: 0-No pain       Patient's Body mass index is 35.46 kg/m². BMI is above normal parameters. Recommendations include: educational material, exercise counseling and nutrition counseling.      Assessment/Plan   Patient Self-Management and Personalized Health Advice  The patient has been provided with information about: diet, exercise and the relationship between weight and GERD and preventive services including:   · Bone densitometry screening, Exercise counseling provided, Fall Risk assessment done, Influenza vaccine, Nutrition counseling provided, Pneumococcal vaccine .    Visit Diagnoses:    ICD-10-CM ICD-9-CM   1. Medicare annual wellness visit, initial Z00.00 V70.0   2. Essential hypertension I10 401.9   3. Mixed hyperlipidemia E78.2 272.2   4. Varicose veins of both lower extremities, " unspecified whether complicated I83.93 454.9   5. Chronic gastritis without bleeding, unspecified gastritis type K29.50 535.10   6. Type 2 diabetes mellitus without complication, without long-term current use of insulin (CMS/HCC) E11.9 250.00   7. Vitamin D deficiency E55.9 268.9   8. Class 2 severe obesity due to excess calories with serious comorbidity and body mass index (BMI) of 35.0 to 35.9 in adult (CMS/[facility]) E66.01 278.01    Z68.35 V85.35   9. Primary malignant neoplasm of right kidney - R nephrectomy 2003 C64.1 189.0   10. Chronic lymphatic leukemia (CMS/HCC) C91.90 204.10   11. Iron deficiency anemia secondary to blood loss (chronic) D50.0 280.0   12. Other dietary vitamin B12 deficiency anemia D51.3 281.1   13. Generalized anxiety disorder F41.1 300.02   14. Hypokalemia E87.6 276.8   15. Hypomagnesemia E83.42 275.2   16. Recurrent major depressive disorder, in full remission (CMS/HCC) F33.42 296.36   17. Sleep disorder G47.9 780.50       No orders of the defined types were placed in this encounter.      Outpatient Encounter Medications as of 2/20/2019   Medication Sig Dispense Refill   • ALPRAZolam (XANAX) 0.5 MG tablet Take 1-2 tablets by mouth At Night As Needed for Sleep. 60 tablet 2   • aspirin 81 MG EC tablet Take 81 mg by mouth Daily.     • atorvastatin (LIPITOR) 40 MG tablet Take 1 tablet by mouth Daily. 90 tablet 3   • carvedilol (COREG) 12.5 MG tablet Take 1 tablet by mouth 2 (Two) Times a Day. 180 tablet 3   • cholecalciferol (VITAMIN D3) 1000 UNITS tablet Take 1,000 Units by mouth Daily.     • FLUoxetine (PROzac) 10 MG capsule Take 3 capsules by mouth Every Morning. 270 capsule 3   • glucose blood (ONETOUCH VERIO) test strip Check glucose daily  E11.9 100 each 3   • Lancets 30G misc Check one time daily E11.9 For Verio glucometer 100 each 3   • lisinopril (PRINIVIL,ZESTRIL) 20 MG tablet Take 1 tablet by mouth Daily. For blood pressure 90 tablet 3   • loratadine (CLARITIN) 10 MG tablet Take 10 mg  by mouth Daily.     • omeprazole (priLOSEC) 40 MG capsule Take 20 mg by mouth Every Morning.     • POTASSIUM CHLORIDE ER PO Take 100 mEq/day by mouth Daily.     • Semaglutide (OZEMPIC) 0.25 or 0.5 MG/DOSE solution pen-injector Inject 0.5 mg under the skin into the appropriate area as directed 1 (One) Time Per Week. 3 pen 3   • spironolactone (ALDACTONE) 25 MG tablet Take 1 tablet by mouth 2 (Two) Times a Day. 180 tablet 3   • triamcinolone (KENALOG) 0.1 % ointment Apply  topically to the appropriate area as directed Every Night. 30 g 0   • vitamin B-12 (CYANOCOBALAMIN) 500 MCG tablet Take 2,500 mcg by mouth Every Other Day.     • vitamin C (ASCORBIC ACID) 500 MG tablet Take 500 mg by mouth Daily.     • [DISCONTINUED] ALPRAZolam (XANAX) 0.5 MG tablet Take 1-2 tablets by mouth At Night As Needed for Sleep. 60 tablet 2   • [DISCONTINUED] atorvastatin (LIPITOR) 40 MG tablet Take 1 tablet by mouth Daily. 90 tablet 3   • [DISCONTINUED] carvedilol (COREG) 12.5 MG tablet Take 1 tablet by mouth 2 (Two) Times a Day. 180 tablet 3   • [DISCONTINUED] FLUoxetine (PROzac) 10 MG capsule Take 3 capsules by mouth Every Morning. 270 capsule 3   • [DISCONTINUED] lisinopril (PRINIVIL,ZESTRIL) 20 MG tablet Take 1 tablet by mouth Daily. For blood pressure 90 tablet 3   • [DISCONTINUED] Semaglutide (OZEMPIC) 0.25 or 0.5 MG/DOSE solution pen-injector Inject 0.5 mg under the skin into the appropriate area as directed 1 (One) Time Per Week. 3 pen 3   • [DISCONTINUED] spironolactone (ALDACTONE) 25 MG tablet Take 1 tablet by mouth 2 (Two) Times a Day. 180 tablet 3     No facility-administered encounter medications on file as of 2/20/2019.        Reviewed use of high risk medication in the elderly: yes  Reviewed for potential of harmful drug interactions in the elderly: yes    Follow Up:  Return in about 6 months (around 8/20/2019) for Next scheduled follow up.     An After Visit Summary and PPPS with all of these plans were given to the  patient.

## 2019-03-19 ENCOUNTER — OFFICE VISIT (OUTPATIENT)
Dept: GASTROENTEROLOGY | Facility: CLINIC | Age: 64
End: 2019-03-19

## 2019-03-19 ENCOUNTER — LAB (OUTPATIENT)
Dept: LAB | Facility: HOSPITAL | Age: 64
End: 2019-03-19

## 2019-03-19 VITALS
WEIGHT: 205.6 LBS | BODY MASS INDEX: 35.1 KG/M2 | HEIGHT: 64 IN | HEART RATE: 81 BPM | SYSTOLIC BLOOD PRESSURE: 160 MMHG | DIASTOLIC BLOOD PRESSURE: 88 MMHG

## 2019-03-19 DIAGNOSIS — K58.0 IRRITABLE BOWEL SYNDROME WITH DIARRHEA: ICD-10-CM

## 2019-03-19 DIAGNOSIS — R19.7 DIARRHEA, UNSPECIFIED TYPE: ICD-10-CM

## 2019-03-19 DIAGNOSIS — R19.7 DIARRHEA, UNSPECIFIED TYPE: Primary | ICD-10-CM

## 2019-03-19 PROCEDURE — 86003 ALLG SPEC IGE CRUDE XTRC EA: CPT

## 2019-03-19 PROCEDURE — 83516 IMMUNOASSAY NONANTIBODY: CPT

## 2019-03-19 PROCEDURE — 99214 OFFICE O/P EST MOD 30 MIN: CPT | Performed by: NURSE PRACTITIONER

## 2019-03-19 RX ORDER — HYOSCYAMINE SULFATE 0.125 MG
0.12 TABLET ORAL
Qty: 120 TABLET | Refills: 5 | Status: SHIPPED | OUTPATIENT
Start: 2019-03-19 | End: 2021-01-01 | Stop reason: SDUPTHER

## 2019-03-19 NOTE — PROGRESS NOTES
Chief Complaint   Patient presents with   • Diarrhea       Subjective    Chio Mary is a 63 y.o. female. she is here today for follow-up.    63-year-old female presents due to chronic diarrhea.  States symptoms have been intermittent over the years but have caused more issues recently.  States she is unable to pinpoint certain trigger but has episodes of sudden onset diarrhea and she cannot find a bathroom very quickly will have incontinence.  She had normal colonoscopy 4/6/18, small polyp was removed with repeat recommended in 3 years for surveillance.  She had her gallbladder out several years ago.  Denies any melena or hematochezia.    Diarrhea    This is a chronic problem. The current episode started more than 1 year ago. The problem occurs 2 to 4 times per day. The problem has been waxing and waning. The stool consistency is described as watery. The patient states that diarrhea does not awaken her from sleep. Associated symptoms include abdominal pain (feels gurgling lower abdomen then urgency and diarrhea ) and arthralgias. Pertinent negatives include no bloating, chills, coughing, fever, headaches, increased  flatus, myalgias, sweats, URI, vomiting or weight loss. Nothing (unable to pinpoint certain trigger ) aggravates the symptoms.     Plan; recurrent GI distress panel to rule out allergen source of diarrhea start on Levsin before meals at bedtime for likely irritable bowel syndrome diarrhea predominant.  Follow-up in 1 month for recheck return office sooner if needed.       The following portions of the patient's history were reviewed and updated as appropriate:   Past Medical History:   Diagnosis Date   • Abnormal liver function     improved   • Anxiety    • Blood in feces    • Chronic lymphatic leukemia (CMS/HCC)     Chronic lymphoid leukemia, disease - CLL, Dr. Mccullough was following. Care transferred to Dr. Michaels 11/2014. Care transferred to Dr. Soriano, 11/2015      • Degenerative joint  disease involving multiple joints     left foot pain aggravated by daily walking for exercise   • Depressive disorder     anxiety. on fluoxetine   • Epigastric pain    • Essential hypertension     severe. improved with weight loss   • Gastritis    • Generalized anxiety disorder     on xanax at bedtime. on fluoxetine. stopped buspar 2/2016   • H/O bone density study 11/11/2014    x2; 11/11/14 - Persistent osteopenia. Lumbar improved 11%.; 06/2010 - revealed osteopenia   • History of colon polyps     x2 3/2010   • History of transfusion    • Hypokalemia     on oral potassium and spironolactone   • Hypomagnesemia     started magox 7/2016   • Iron deficiency anemia secondary to blood loss (chronic)     much improved after iron infusions   • Migraine     h/o   • Obesity    • Osteopenia     next DEXA due 11/2016   • Other dietary vitamin B12 deficiency anemia 8/14/2018   • Primary malignant neoplasm of kidney (CMS/HCC)     renal cell carcinoma s/p R nephrectomy 4/2003   • Primary malignant neoplasm of right kidney - R nephrectomy 2003     renal cell carcinoma s/p R nephrectomy 4/2003   • Prolapsed thoracic intervertebral disc     Prolapsed intervertebral disc without myelopathy - T8, T9      • Pure hypercholesterolemia     changed zocor and zetia to lipitor 11/2014   • Sleep disorder     on xanax   • Tobacco use     tobacco use and exposure - quit 2002   • Tubular adenoma     h/o 2010   • Type 2 diabetes mellitus (CMS/HCC)     on low-dose metformin. higher dose metformin produced diarrhea   • Varicose veins of both lower extremities 8/14/2018   • Vitamin D deficiency     on oral vit D   • Yeast infection      Past Surgical History:   Procedure Laterality Date   • BACK SURGERY      low back disk surgery   • CHOLECYSTECTOMY     • COLONOSCOPY  03/22/2010    Two polyps were found in the cecum and ascending colon. Internal and external hemorrhoids were found.    • COLONOSCOPY N/A 4/6/2018    Procedure: COLONOSCOPY;  Surgeon:  Lm Gutierrez MD;  Location: [facility] ENDOSCOPY;  Service: Gastroenterology   • COLONOSCOPY W/ POLYPECTOMY  2015    1 polyp in sigmoid colon; removed by snare cautery polypectomy. Internal & external hemorrhoids found.   • ENDOSCOPY W/ PEG TUBE PLACEMENT  2015    Normal esophagus. Gastritis in stomach. Biopsy taken. Normal duodenum. Biopsy taken.   • INGUINAL HERNIA REPAIR LAPAROSCOPIC AND UMBILICAL HERNIA REPAIR Right 2004    R inguinal, umbilical hernia repair   • INJECTION OF MEDICATION  2014    depo medrol 80mg   • INJECTION OF MEDICATION  2015    IV Infusion, Med    • INJECTION OF MEDICATION  2014    rocephin   • JOINT REPLACEMENT     • NEPHRECTOMY Right     s/p R nephrectomy  Dr. Franke secondary to RCCA   • OTHER SURGICAL HISTORY  2013    Inj(s) Tend-Sheath, Ligament, Single     • OTHER SURGICAL HISTORY  10/23/2013    Small Joint Injection/Aspiration     • OVARIAN CYST SURGERY     • TOTAL ABDOMINAL HYSTERECTOMY WITH SALPINGO OOPHORECTOMY  's   • TOTAL HIP ARTHROPLASTY      s/p R 2009, s/p L 2009   • VARICOSE VEIN SURGERY      phleb veins - extrem (to 20) - Varicose veins, left lower extremity. Stripping and ligation fo saphenous varicose veins, left thigh and left lower lip.     Family History   Problem Relation Age of Onset   • Pancreatic cancer Mother    • Colon cancer Other         strong family h/o   • Cancer Other    • Thyroid disease Other    • Breast cancer Sister      OB History      Para Term  AB Living    2 2 2          SAB TAB Ectopic Molar Multiple Live Births                       Prior to Admission medications    Medication Sig Start Date End Date Taking? Authorizing Provider   ALPRAZolam (XANAX) 0.5 MG tablet Take 1-2 tablets by mouth At Night As Needed for Sleep. 19  Yes Michael Quiñonez MD   aspirin 81 MG EC tablet Take 81 mg by mouth Daily.   Yes Provider, MD Kiley   atorvastatin (LIPITOR) 40 MG  tablet Take 1 tablet by mouth Daily. 2/20/19  Yes Michael Quiñonez MD   carvedilol (COREG) 12.5 MG tablet Take 1 tablet by mouth 2 (Two) Times a Day. 2/20/19  Yes Michael Quiñonez MD   cholecalciferol (VITAMIN D3) 1000 UNITS tablet Take 1,000 Units by mouth Daily.   Yes Kiley Sifuentes MD   FLUoxetine (PROzac) 10 MG capsule Take 3 capsules by mouth Every Morning. 2/20/19  Yes Michael Quiñonez MD   glucose blood (ONETOUCH VERIO) test strip Check glucose daily  E11.9 10/1/18  Yes Michael Quiñonez MD   Lancets 30G misc Check one time daily E11.9 For Verio glucometer 10/1/18  Yes Michael Quiñonez MD   lisinopril (PRINIVIL,ZESTRIL) 20 MG tablet Take 1 tablet by mouth Daily. For blood pressure 2/20/19  Yes Michael Quiñonez MD   loratadine (CLARITIN) 10 MG tablet Take 10 mg by mouth Daily.   Yes Kiley Sifuentes MD   omeprazole (priLOSEC) 40 MG capsule Take 20 mg by mouth Every Morning.   Yes Kiley Sifuentes MD   POTASSIUM CHLORIDE ER PO Take 100 mEq/day by mouth Daily.   Yes Kiley Sifuentes MD   Semaglutide (OZEMPIC) 0.25 or 0.5 MG/DOSE solution pen-injector Inject 0.5 mg under the skin into the appropriate area as directed 1 (One) Time Per Week. 2/20/19  Yes Michael Quiñonez MD   spironolactone (ALDACTONE) 25 MG tablet Take 1 tablet by mouth 2 (Two) Times a Day. 2/20/19  Yes Michael Quiñonez MD   triamcinolone (KENALOG) 0.1 % ointment Apply  topically to the appropriate area as directed Every Night. 9/24/18  Yes Priti Hernandez APRN   vitamin B-12 (CYANOCOBALAMIN) 500 MCG tablet Take 2,500 mcg by mouth Every Other Day.   Yes Kiley Sifuentes MD   vitamin C (ASCORBIC ACID) 500 MG tablet Take 500 mg by mouth Daily.   Yes Kiley Sifuentes MD     Allergies   Allergen Reactions   • Jardiance [Empagliflozin] Other (See Comments)     Vaginitis     • Metformin Diarrhea   • Nsaids      Due to only one kidney   • Tolmetin Other (See Comments)     Due to only one kidney     Social History  "    Socioeconomic History   • Marital status:      Spouse name: Not on file   • Number of children: Not on file   • Years of education: Not on file   • Highest education level: Not on file   Tobacco Use   • Smoking status: Former Smoker     Last attempt to quit:      Years since quittin.2   • Smokeless tobacco: Never Used   Substance and Sexual Activity   • Alcohol use: No   • Drug use: No   • Sexual activity: No     Comment:  for 10 years       Review of Systems  Review of Systems   Constitutional: Negative for activity change, appetite change, chills, diaphoresis, fatigue, fever, unexpected weight change and weight loss.   HENT: Negative for sore throat and trouble swallowing.    Respiratory: Negative for cough and shortness of breath.    Gastrointestinal: Positive for abdominal pain (feels gurgling lower abdomen then urgency and diarrhea ) and diarrhea. Negative for abdominal distention, anal bleeding, bloating, blood in stool, constipation, flatus, nausea, rectal pain and vomiting.   Musculoskeletal: Positive for arthralgias. Negative for myalgias.   Skin: Negative for pallor.   Neurological: Negative for light-headedness and headaches.        /88 (BP Location: Left arm)   Pulse 81   Ht 162.6 cm (64\")   Wt 93.3 kg (205 lb 9.6 oz)   LMP  (LMP Unknown) Comment: Hyst w/ BSO in   BMI 35.29 kg/m²     Objective    Physical Exam   Constitutional: She is oriented to person, place, and time. She appears well-developed and well-nourished. She is cooperative. No distress.   HENT:   Head: Normocephalic and atraumatic.   Neck: Normal range of motion. Neck supple. No thyromegaly present.   Cardiovascular: Normal rate, regular rhythm and normal heart sounds.   Pulmonary/Chest: Effort normal and breath sounds normal. She has no wheezes. She has no rhonchi. She has no rales.   Abdominal: Soft. Normal appearance and bowel sounds are normal. She exhibits no shifting dullness, no distension, no " fluid wave and no ascites. There is no hepatosplenomegaly. There is no tenderness. There is no rigidity and no guarding. No hernia.   Lymphadenopathy:     She has no cervical adenopathy.   Neurological: She is alert and oriented to person, place, and time.   Skin: Skin is warm, dry and intact. No rash noted. No pallor.   Psychiatric: She has a normal mood and affect. Her speech is normal.     Lab on 02/13/2019   Component Date Value Ref Range Status   • WBC 02/13/2019 7.63  3.20 - 9.80 10*3/mm3 Final   • RBC 02/13/2019 4.69  3.77 - 5.16 10*6/mm3 Final   • Hemoglobin 02/13/2019 14.2  12.0 - 15.5 g/dL Final   • Hematocrit 02/13/2019 44.1  35.0 - 45.0 % Final   • MCV 02/13/2019 94.0  80.0 - 98.0 fL Final   • MCH 02/13/2019 30.3  26.5 - 34.0 pg Final   • MCHC 02/13/2019 32.2  31.4 - 36.0 g/dL Final   • RDW 02/13/2019 15.2* 11.5 - 14.5 % Final   • RDW-SD 02/13/2019 50.6* 36.4 - 46.3 fl Final   • MPV 02/13/2019 9.9  8.0 - 12.0 fL Final   • Platelets 02/13/2019 111* 150 - 450 10*3/mm3 Final   • Glucose 02/13/2019 99  74 - 99 mg/dL Final   • BUN 02/13/2019 16  7 - 17 mg/dL Final   • Creatinine 02/13/2019 1.13* 0.52 - 1.04 mg/dL Final   • Sodium 02/13/2019 143  137 - 145 mmol/L Final   • Potassium 02/13/2019 4.7  3.4 - 5.0 mmol/L Final   • Chloride 02/13/2019 105  98 - 107 mmol/L Final   • CO2 02/13/2019 31.0* 22.0 - 30.0 mmol/L Final   • Calcium 02/13/2019 9.3  8.4 - 10.2 mg/dL Final   • Total Protein 02/13/2019 7.2  6.3 - 8.2 g/dL Final   • Albumin 02/13/2019 4.50  3.50 - 5.00 g/dL Final   • ALT (SGPT) 02/13/2019 18  <=35 U/L Final   • AST (SGOT) 02/13/2019 21  14 - 36 U/L Final   • Alkaline Phosphatase 02/13/2019 100  38 - 126 U/L Final   • Total Bilirubin 02/13/2019 2.7* 0.2 - 1.3 mg/dL Final   • eGFR Non African Amer 02/13/2019 48  45 - 104 mL/min/1.73 Final   • Globulin 02/13/2019 2.7  2.3 - 3.5 gm/dL Final   • A/G Ratio 02/13/2019 1.7  1.1 - 1.8 g/dL Final   • BUN/Creatinine Ratio 02/13/2019 14.2  7.0 - 25.0 Final   •  Anion Gap 02/13/2019 7.0  5.0 - 15.0 mmol/L Final   • Hemoglobin A1C 02/13/2019 5.8* 4 - 5.6 % Final   • LDL Cholesterol  02/13/2019 85  1 - 129 mg/dL Final   • Ferritin 02/13/2019 25.80  11.10 - 264.00 ng/mL Final   • 25 Hydroxy, Vitamin D 02/13/2019 48.6  30.0 - 100.0 ng/ml Final   • Vitamin B-12 02/13/2019 688  239 - 931 pg/mL Final   • Scan Slide 02/13/2019    Final    See Manual Differential Results   • Neutrophil % 02/13/2019 34.0* 37.0 - 80.0 % Final   • Lymphocyte % 02/13/2019 57.0* 10.0 - 50.0 % Final   • Monocyte % 02/13/2019 8.0  0.0 - 12.0 % Final   • Basophil % 02/13/2019 1.0  0.0 - 2.0 % Final   • Neutrophils Absolute 02/13/2019 2.59  2.00 - 8.60 10*3/mm3 Final   • Lymphocytes Absolute 02/13/2019 4.35* 0.60 - 4.20 10*3/mm3 Final   • Monocytes Absolute 02/13/2019 0.61  0.00 - 0.90 10*3/mm3 Final   • Basophils Absolute 02/13/2019 0.08  0.00 - 0.20 10*3/mm3 Final   • Anisocytosis 02/13/2019 Slight/1+  None Seen Final   • WBC Morphology 02/13/2019 Normal  Normal Final   • Platelet Estimate 02/13/2019 Decreased  Normal Final     Assessment/Plan      1. Diarrhea, unspecified type    2. Irritable bowel syndrome with diarrhea    .       Orders placed during this encounter include:  Orders Placed This Encounter   Procedures   • Recurrent Gastrointestinal Distress     Standing Status:   Future     Standing Expiration Date:   3/18/2020       * Surgery not found *    Review and/or summary of lab tests, radiology, procedures, medications. Review and summary of old records and obtaining of history. The risks and benefits of my recommendations, as well as other treatment options were discussed with the patient today. Questions were answered.    New Medications Ordered This Visit   Medications   • hyoscyamine (ANASPAZ,LEVSIN) 0.125 MG tablet     Sig: Take 1 tablet by mouth 4 (Four) Times a Day With Meals & at Bedtime.     Dispense:  120 tablet     Refill:  5       Follow-up: Return in about 4 weeks (around  4/16/2019).          This document has been electronically signed by ISMAEL Leone on March 19, 2019 11:21 AM             Results for orders placed or performed in visit on 02/13/19   Scan Slide   Result Value Ref Range    Scan Slide     CBC Auto Differential   Result Value Ref Range    WBC 7.63 3.20 - 9.80 10*3/mm3    RBC 4.69 3.77 - 5.16 10*6/mm3    Hemoglobin 14.2 12.0 - 15.5 g/dL    Hematocrit 44.1 35.0 - 45.0 %    MCV 94.0 80.0 - 98.0 fL    MCH 30.3 26.5 - 34.0 pg    MCHC 32.2 31.4 - 36.0 g/dL    RDW 15.2 (H) 11.5 - 14.5 %    RDW-SD 50.6 (H) 36.4 - 46.3 fl    MPV 9.9 8.0 - 12.0 fL    Platelets 111 (L) 150 - 450 10*3/mm3   Vitamin D 25 Hydroxy   Result Value Ref Range    25 Hydroxy, Vitamin D 48.6 30.0 - 100.0 ng/ml   Manual Differential   Result Value Ref Range    Neutrophil % 34.0 (L) 37.0 - 80.0 %    Lymphocyte % 57.0 (H) 10.0 - 50.0 %    Monocyte % 8.0 0.0 - 12.0 %    Basophil % 1.0 0.0 - 2.0 %    Neutrophils Absolute 2.59 2.00 - 8.60 10*3/mm3    Lymphocytes Absolute 4.35 (H) 0.60 - 4.20 10*3/mm3    Monocytes Absolute 0.61 0.00 - 0.90 10*3/mm3    Basophils Absolute 0.08 0.00 - 0.20 10*3/mm3    Anisocytosis Slight/1+ None Seen    WBC Morphology Normal Normal    Platelet Estimate Decreased Normal   LDL Cholesterol, Direct   Result Value Ref Range    LDL Cholesterol  85 1 - 129 mg/dL   Hemoglobin A1c   Result Value Ref Range    Hemoglobin A1C 5.8 (H) 4 - 5.6 %   Ferritin   Result Value Ref Range    Ferritin 25.80 11.10 - 264.00 ng/mL   Vitamin B12   Result Value Ref Range    Vitamin B-12 688 239 - 931 pg/mL   Comprehensive Metabolic Panel   Result Value Ref Range    Glucose 99 74 - 99 mg/dL    BUN 16 7 - 17 mg/dL    Creatinine 1.13 (H) 0.52 - 1.04 mg/dL    Sodium 143 137 - 145 mmol/L    Potassium 4.7 3.4 - 5.0 mmol/L    Chloride 105 98 - 107 mmol/L    CO2 31.0 (H) 22.0 - 30.0 mmol/L    Calcium 9.3 8.4 - 10.2 mg/dL    Total Protein 7.2 6.3 - 8.2 g/dL    Albumin 4.50 3.50 - 5.00 g/dL    ALT (SGPT) 18 <=35  U/L    AST (SGOT) 21 14 - 36 U/L    Alkaline Phosphatase 100 38 - 126 U/L    Total Bilirubin 2.7 (H) 0.2 - 1.3 mg/dL    eGFR Non  Amer 48 45 - 104 mL/min/1.73    Globulin 2.7 2.3 - 3.5 gm/dL    A/G Ratio 1.7 1.1 - 1.8 g/dL    BUN/Creatinine Ratio 14.2 7.0 - 25.0    Anion Gap 7.0 5.0 - 15.0 mmol/L   Results for orders placed or performed in visit on 09/24/18   Liquid-based Pap Smear, Screening   Result Value Ref Range    Case Report       Gynecologic Cytology Report                       Case: [id number]                                  Authorizing Provider:  Priti Hernandez    Collected:           09/24/2018 09:45 AM                                 ISMAEL Pizarro                                                                  Ordering Location:     [facility]     Received:            09/24/2018 01:48 PM                                 [facility]                                                               First Screen:          Hortencia Olson                                                          Specimen:    Liquid-Based Pap, Screening, Vagina                                                        Interpretation Negative for intraepithelial lesion or malignancy      Other Findings Atrophy with inflammation     Specimen Adequacy Satisfactory for evaluation     Additional Information       Disclaimer: Cervical cytology is a screening test primarily for squamous cancer and its precursors and has associated false-negative and false-positive results.  Technologies such as liquid-based preparations may decrease but will not eliminate all false-negative results.  Follow-up of unexplained clinical signs and symptoms is recommended to minimize false-negative results. (The [employer] System for Reporting Cervical Cytology: [place], 2015).     Results for orders placed or performed in visit on 08/07/18   CBC Auto Differential   Result Value Ref Range    WBC 4.05 3.20 - 9.80 10*3/mm3    RBC  4.56 3.77 - 5.16 10*6/mm3    Hemoglobin 13.7 12.0 - 15.5 g/dL    Hematocrit 42.4 35.0 - 45.0 %    MCV 93.0 80.0 - 98.0 fL    MCH 30.0 26.5 - 34.0 pg    MCHC 32.3 31.4 - 36.0 g/dL    RDW 15.2 (H) 11.5 - 14.5 %    RDW-SD 49.8 (H) 36.4 - 46.3 fl    MPV 10.1 8.0 - 12.0 fL    Platelets 119 (L) 150 - 450 10*3/mm3    Neutrophil % 41.5 37.0 - 80.0 %    Lymphocyte % 48.4 10.0 - 50.0 %    Monocyte % 7.2 0.0 - 12.0 %    Eosinophil % 2.2 0.0 - 7.0 %    Basophil % 0.7 0.0 - 2.0 %    Neutrophils, Absolute 1.68 (L) 2.00 - 8.60 10*3/mm3    Lymphocytes, Absolute 1.96 0.60 - 4.20 10*3/mm3    Monocytes, Absolute 0.29 0.00 - 0.90 10*3/mm3    Eosinophils, Absolute 0.09 0.00 - 0.70 10*3/mm3    Basophils, Absolute 0.03 0.00 - 0.20 10*3/mm3   Vitamin D 25 Hydroxy   Result Value Ref Range    25 Hydroxy, Vitamin D 51.9 30.0 - 100.0 ng/ml   TSH   Result Value Ref Range    TSH 1.280 0.460 - 4.680 mIU/mL   Magnesium   Result Value Ref Range    Magnesium 1.7 1.6 - 2.3 mg/dL   Hemoglobin A1c   Result Value Ref Range    Hemoglobin A1C 6.4 (H) 4 - 5.6 %   Vitamin B12   Result Value Ref Range    Vitamin B-12 763 239 - 931 pg/mL   Lipid Panel   Result Value Ref Range    Total Cholesterol 144 (L) 150 - 200 mg/dL    Triglycerides 178 (H) <=150 mg/dL    HDL Cholesterol 35 (L) 40 - 59 mg/dL    LDL Cholesterol  73 <=100 mg/dL    VLDL Cholesterol 35.6 mg/dL    LDL/HDL Ratio 2.10 0.00 - 3.22   Comprehensive Metabolic Panel   Result Value Ref Range    Glucose 115 (H) 74 - 99 mg/dL    BUN 16 7 - 17 mg/dL    Creatinine 1.04 0.52 - 1.04 mg/dL    Sodium 145 137 - 145 mmol/L    Potassium 4.1 3.4 - 5.0 mmol/L    Chloride 109 (H) 98 - 107 mmol/L    CO2 29.0 22.0 - 30.0 mmol/L    Calcium 9.3 8.4 - 10.2 mg/dL    Total Protein 7.3 6.3 - 8.2 g/dL    Albumin 4.30 3.50 - 5.00 g/dL    ALT (SGPT) 18 <=35 U/L    AST (SGOT) 21 14 - 36 U/L    Alkaline Phosphatase 105 38 - 126 U/L    Total Bilirubin 1.7 (H) 0.2 - 1.3 mg/dL    eGFR Non  Amer 53 45 - 104 mL/min/1.73     Globulin 3.0 2.3 - 3.5 gm/dL    A/G Ratio 1.4 1.1 - 1.8 g/dL    BUN/Creatinine Ratio 15.4 7.0 - 25.0    Anion Gap 7.0 5.0 - 15.0 mmol/L     *Note: Due to a large number of results and/or encounters for the requested time period, some results have not been displayed. A complete set of results can be found in Results Review.

## 2019-03-19 NOTE — PATIENT INSTRUCTIONS
Diarrhea, Adult  Diarrhea is frequent loose and watery bowel movements. Diarrhea can make you feel weak and cause you to become dehydrated. Dehydration can make you tired and thirsty, cause you to have a dry mouth, and decrease how often you urinate. Diarrhea typically lasts 2-3 days. However, it can last longer if it is a sign of something more serious. It is important to treat your diarrhea as told by your health care provider.  Follow these instructions at home:  Eating and drinking  Follow these recommendations as told by your health care provider:  · Take an oral rehydration solution (ORS). This is a drink that is sold at pharmacies and retail stores.  · Drink clear fluids, such as water, ice chips, diluted fruit juice, and low-calorie sports drinks.  · Eat bland, easy-to-digest foods in small amounts as you are able. These foods include bananas, applesauce, rice, lean meats, toast, and crackers.  · Avoid drinking fluids that contain a lot of sugar or caffeine, such as energy drinks, sports drinks, and soda.  · Avoid alcohol.  · Avoid spicy or fatty foods.    General instructions  · Drink enough fluid to keep your urine clear or pale yellow.  · Wash your hands often. If soap and water are not available, use hand .  · Make sure that all people in your household wash their hands well and often.  · Take over-the-counter and prescription medicines only as told by your health care provider.  · Rest at home while you recover.  · Watch your condition for any changes.  · Take a warm bath to relieve any burning or pain from frequent diarrhea episodes.  · Keep all follow-up visits as told by your health care provider. This is important.  Contact a health care provider if:  · You have a fever.  · Your diarrhea gets worse.  · You have new symptoms.  · You cannot keep fluids down.  · You feel light-headed or dizzy.  · You have a headache  · You have muscle cramps.  Get help right away if:  · You have chest  "pain.  · You feel extremely weak or you faint.  · You have bloody or black stools or stools that look like tar.  · You have severe pain, cramping, or bloating in your abdomen.  · You have trouble breathing or you are breathing very quickly.  · Your heart is beating very quickly.  · Your skin feels cold and clammy.  · You feel confused.  · You have signs of dehydration, such as:  ? Dark urine, very little urine, or no urine.  ? Cracked lips.  ? Dry mouth.  ? Sunken eyes.  ? Sleepiness.  ? Weakness.  This information is not intended to replace advice given to you by your health care provider. Make sure you discuss any questions you have with your health care provider.  Document Released: 12/08/2003 Document Revised: 08/01/2018 Document Reviewed: 08/23/2016  [employer] Interactive Patient Education © 2019 [employer]    Diet for Irritable Bowel Syndrome  When you have irritable bowel syndrome (IBS), it is very important to eat the foods and follow the eating habits that are best for your condition. IBS may cause various symptoms such as pain in the abdomen, constipation, or diarrhea. Choosing the right foods can help to ease the discomfort from these symptoms. Work with your health care provider and diet and nutrition specialist (dietitian) to find the eating plan that will help to control your symptoms.  What are tips for following this plan?  · Keep a food diary. This will help you identify foods that cause symptoms. Write down:  ? What you eat and when you eat it.  ? What symptoms you have.  ? When symptoms occur in relation to your meals, such as \"pain in abdomen 2 hours after dinner.\"  · Eat your meals slowly and in a relaxed setting.  · Aim to eat 5-6 small meals per day. Do not skip meals.  · Drink enough fluid to keep your urine pale yellow.  · Ask your health care provider if you should take an over-the-counter probiotic to help restore healthy bacteria in your gut (digestive tract).  ? Probiotics are foods " that contain good bacteria and yeasts.  · Your dietitian may have specific dietary recommendations for you based on your symptoms. He or she may recommend that you:  ? Avoid foods that cause symptoms. Talk with your dietitian about other ways to get the same nutrients that are in those problem foods.  ? Avoid foods with gluten. Gluten is a protein that is found in rye, wheat, and barley.  ? Eat more foods that contain soluble fiber. Examples of foods with high soluble fiber include oats, seeds, and certain fruits and vegetables. Take a fiber supplement if directed by your dietitian.  ? Reduce or avoid certain foods called FODMAPs. These are foods that contain carbohydrates that are hard to digest. Ask your doctor which foods contain these carbohydrates.  What foods are not recommended?  The following are some foods and drinks that may make your symptoms worse:  · Fatty foods, such as french fries.  · Foods that contain gluten, such as pasta and cereal.  · Dairy products, such as milk, cheese, and ice cream.  · Chocolate.  · Alcohol.  · Products with caffeine, such as coffee.  · Carbonated drinks, such as soda.  · Foods that are high in FODMAPs. These include certain fruits and vegetables.  · Products with sweeteners such as honey, high fructose corn syrup, sorbitol, and mannitol.    The items listed above may not be a complete list of foods and beverages to avoid. Contact your dietitian for more information.  What foods are good sources of fiber?  Your health care provider or dietitian may recommend that you eat more foods that contain fiber. Fiber can help to reduce constipation and other IBS symptoms. Add foods with fiber to your diet a little at a time so your body can get used to them. Too much fiber at one time might cause gas and swelling of your abdomen. The following are some foods that are good sources of fiber:  · Berries, such as raspberries, strawberries, and  blueberries.  · Tomatoes.  · Carrots.  · Brown rice.  · Oats.  · Seeds, such as soo and pumpkin seeds.    The items listed above may not be a complete list of recommended sources of fiber. Contact your dietitian for more options.  Where to find more information  · International Foundation for Functional Gastrointestinal Disorders: www.iffgd.org  · National [place] of Diabetes and Digestive and Kidney Diseases: www.niddk.nih.gov  Summary  · When you have irritable bowel syndrome (IBS), it is very important to eat the foods and follow the eating habits that are best for your condition.  · IBS may cause various symptoms such as pain in the abdomen, constipation, or diarrhea.  · Choosing the right foods can help to ease the discomfort that comes from symptoms.  · Keep a food diary. This will help you identify foods that cause symptoms.  · Your health care provider or diet and nutrition specialist (dietitian) may recommend that you eat more foods that contain fiber.  This information is not intended to replace advice given to you by your health care provider. Make sure you discuss any questions you have with your health care provider.  Document Released: 03/09/2005 Document Revised: 08/21/2018 Document Reviewed: 08/21/2018  [employer] Interactive Patient Education © 2019 [employer]

## 2019-03-20 LAB
GLIADIN PEPTIDE IGA SER-ACNC: 2 UNITS (ref 0–19)
GLIADIN PEPTIDE IGG SER-ACNC: 3 UNITS (ref 0–19)
TTG IGA SER-ACNC: <2 U/ML (ref 0–3)
TTG IGG SER-ACNC: <2 U/ML (ref 0–5)

## 2019-03-24 LAB
CALIF WALNUT POLN IGE QN: <0.1 KU/L
CODFISH IGE QN: <0.1 KU/L
CONV CLASS DESCRIPTION: NORMAL
COW MILK IGE QN: <0.1 KU/L
EGG WHITE IGE QN: <0.1 KU/L
GLUTEN IGE QN: <0.1 KU/L
HAZELNUT IGE QN: <0.1 KU/L
PEANUT IGE QN: <0.1 KU/L
SCALLOP IGE QN: <0.1 KU/L
SESAME SEED IGE: <0.1 KU/L
SHRIMP IGE: <0.1 KU/L
SOYBEAN IGE QN: <0.1 KU/L
WHEAT IGE QN: <0.1 KU/L

## 2019-03-28 ENCOUNTER — TELEPHONE (OUTPATIENT)
Dept: GASTROENTEROLOGY | Facility: CLINIC | Age: 64
End: 2019-03-28

## 2019-03-28 NOTE — TELEPHONE ENCOUNTER
Contacted patient regarding lab results. Relayed normal results to patient who voiced understanding.

## 2019-04-17 ENCOUNTER — OFFICE VISIT (OUTPATIENT)
Dept: GASTROENTEROLOGY | Facility: CLINIC | Age: 64
End: 2019-04-17

## 2019-04-17 VITALS
BODY MASS INDEX: 34.93 KG/M2 | HEIGHT: 64 IN | DIASTOLIC BLOOD PRESSURE: 82 MMHG | HEART RATE: 81 BPM | WEIGHT: 204.6 LBS | SYSTOLIC BLOOD PRESSURE: 132 MMHG

## 2019-04-17 DIAGNOSIS — K58.0 IRRITABLE BOWEL SYNDROME WITH DIARRHEA: Primary | ICD-10-CM

## 2019-04-17 PROCEDURE — 99214 OFFICE O/P EST MOD 30 MIN: CPT | Performed by: NURSE PRACTITIONER

## 2019-04-17 NOTE — PATIENT INSTRUCTIONS
Diarrhea, Adult  Diarrhea is frequent loose and watery bowel movements. Diarrhea can make you feel weak and cause you to become dehydrated. Dehydration can make you tired and thirsty, cause you to have a dry mouth, and decrease how often you urinate. Diarrhea typically lasts 2-3 days. However, it can last longer if it is a sign of something more serious. It is important to treat your diarrhea as told by your health care provider.  Follow these instructions at home:  Eating and drinking  Follow these recommendations as told by your health care provider:  · Take an oral rehydration solution (ORS). This is a drink that is sold at pharmacies and retail stores.  · Drink clear fluids, such as water, ice chips, diluted fruit juice, and low-calorie sports drinks.  · Eat bland, easy-to-digest foods in small amounts as you are able. These foods include bananas, applesauce, rice, lean meats, toast, and crackers.  · Avoid drinking fluids that contain a lot of sugar or caffeine, such as energy drinks, sports drinks, and soda.  · Avoid alcohol.  · Avoid spicy or fatty foods.    General instructions  · Drink enough fluid to keep your urine clear or pale yellow.  · Wash your hands often. If soap and water are not available, use hand .  · Make sure that all people in your household wash their hands well and often.  · Take over-the-counter and prescription medicines only as told by your health care provider.  · Rest at home while you recover.  · Watch your condition for any changes.  · Take a warm bath to relieve any burning or pain from frequent diarrhea episodes.  · Keep all follow-up visits as told by your health care provider. This is important.  Contact a health care provider if:  · You have a fever.  · Your diarrhea gets worse.  · You have new symptoms.  · You cannot keep fluids down.  · You feel light-headed or dizzy.  · You have a headache  · You have muscle cramps.  Get help right away if:  · You have chest  pain.  · You feel extremely weak or you faint.  · You have bloody or black stools or stools that look like tar.  · You have severe pain, cramping, or bloating in your abdomen.  · You have trouble breathing or you are breathing very quickly.  · Your heart is beating very quickly.  · Your skin feels cold and clammy.  · You feel confused.  · You have signs of dehydration, such as:  ? Dark urine, very little urine, or no urine.  ? Cracked lips.  ? Dry mouth.  ? Sunken eyes.  ? Sleepiness.  ? Weakness.  This information is not intended to replace advice given to you by your health care provider. Make sure you discuss any questions you have with your health care provider.  Document Released: 12/08/2003 Document Revised: 08/01/2018 Document Reviewed: 08/23/2016  [employer] Interactive Patient Education © 2019 [employer]

## 2019-04-17 NOTE — PROGRESS NOTES
Chief Complaint   Patient presents with   • Diarrhea       Subjective    Chio Mary is a 63 y.o. female. she is here today for follow-up.  63-year-old female seen for follow-up regarding irritable bowel syndrome diarrhea predominant.  At last visit we order GI distress panel which noted no reactants.  Lessened and not improved symptoms.  Colonoscopy was normal 4/6/18.  Small polyp was removed with repeat recommended in 3 years for surveillance.  States her gallbladder was removed several years ago when symptoms only recently became an issue.    Irritable Bowel Syndrome   This is a chronic problem. The current episode started more than 1 year ago. The problem occurs daily. The problem has been waxing and waning. Associated symptoms include abdominal pain. Pertinent negatives include no anorexia, arthralgias, change in bowel habit, chest pain, chills, diaphoresis, fatigue, fever, nausea, sore throat or vomiting. Associated symptoms comments: diarrhea.     Plan; Xifaxan 550 mg 3 times per day for 2 weeks for irritable bowel syndrome diarrhea predominant.  Discussed low fodmaps diet.  Follow-up in 8 weeks for recheck return office sooner if needed       The following portions of the patient's history were reviewed and updated as appropriate:   Past Medical History:   Diagnosis Date   • Abnormal liver function     improved   • Anxiety    • Blood in feces    • Chronic lymphatic leukemia (CMS/HCC)     Chronic lymphoid leukemia, disease - CLL, Dr. Mccullough was following. Care transferred to Dr. Michaels 11/2014. Care transferred to Dr. Soriano, 11/2015      • Degenerative joint disease involving multiple joints     left foot pain aggravated by daily walking for exercise   • Depressive disorder     anxiety. on fluoxetine   • Epigastric pain    • Essential hypertension     severe. improved with weight loss   • Gastritis    • Generalized anxiety disorder     on xanax at bedtime. on fluoxetine. stopped buspar 2/2016   •  H/O bone density study 11/11/2014    x2; 11/11/14 - Persistent osteopenia. Lumbar improved 11%.; 06/2010 - revealed osteopenia   • History of colon polyps     x2 3/2010   • History of transfusion    • Hypokalemia     on oral potassium and spironolactone   • Hypomagnesemia     started magox 7/2016   • Iron deficiency anemia secondary to blood loss (chronic)     much improved after iron infusions   • Migraine     h/o   • Obesity    • Osteopenia     next DEXA due 11/2016   • Other dietary vitamin B12 deficiency anemia 8/14/2018   • Primary malignant neoplasm of kidney (CMS/HCC)     renal cell carcinoma s/p R nephrectomy 4/2003   • Primary malignant neoplasm of right kidney - R nephrectomy 2003     renal cell carcinoma s/p R nephrectomy 4/2003   • Prolapsed thoracic intervertebral disc     Prolapsed intervertebral disc without myelopathy - T8, T9      • Pure hypercholesterolemia     changed zocor and zetia to lipitor 11/2014   • Sleep disorder     on xanax   • Tobacco use     tobacco use and exposure - quit 2002   • Tubular adenoma     h/o 2010   • Type 2 diabetes mellitus (CMS/HCC)     on low-dose metformin. higher dose metformin produced diarrhea   • Varicose veins of both lower extremities 8/14/2018   • Vitamin D deficiency     on oral vit D   • Yeast infection      Past Surgical History:   Procedure Laterality Date   • BACK SURGERY      low back disk surgery   • CHOLECYSTECTOMY     • COLONOSCOPY  03/22/2010    Two polyps were found in the cecum and ascending colon. Internal and external hemorrhoids were found.    • COLONOSCOPY N/A 4/6/2018    Procedure: COLONOSCOPY;  Surgeon: Lm Gutierrez MD;  Location: [facility] ENDOSCOPY;  Service: Gastroenterology   • COLONOSCOPY W/ POLYPECTOMY  02/13/2015    1 polyp in sigmoid colon; removed by snare cautery polypectomy. Internal & external hemorrhoids found.   • ENDOSCOPY W/ PEG TUBE PLACEMENT  02/13/2015    Normal esophagus. Gastritis in stomach. Biopsy taken. Normal  duodenum. Biopsy taken.   • INGUINAL HERNIA REPAIR LAPAROSCOPIC AND UMBILICAL HERNIA REPAIR Right 2004    R inguinal, umbilical hernia repair   • INJECTION OF MEDICATION  2014    depo medrol 80mg   • INJECTION OF MEDICATION  2015    IV Infusion, Med    • INJECTION OF MEDICATION  2014    rocephin   • JOINT REPLACEMENT     • NEPHRECTOMY Right     s/p R nephrectomy  Dr. Franke secondary to RCCA   • OTHER SURGICAL HISTORY  2013    Inj(s) Tend-Sheath, Ligament, Single     • OTHER SURGICAL HISTORY  10/23/2013    Small Joint Injection/Aspiration     • OVARIAN CYST SURGERY     • TOTAL ABDOMINAL HYSTERECTOMY WITH SALPINGO OOPHORECTOMY  's   • TOTAL HIP ARTHROPLASTY      s/p R 2009, s/p L 2009   • VARICOSE VEIN SURGERY      phleb veins - extrem (to ) - Varicose veins, left lower extremity. Stripping and ligation fo saphenous varicose veins, left thigh and left lower lip.     Family History   Problem Relation Age of Onset   • Pancreatic cancer Mother    • Colon cancer Other         strong family h/o   • Cancer Other    • Thyroid disease Other    • Breast cancer Sister      OB History      Para Term  AB Living    2 2 2          SAB TAB Ectopic Molar Multiple Live Births                       Prior to Admission medications    Medication Sig Start Date End Date Taking? Authorizing Provider   ALPRAZolam (XANAX) 0.5 MG tablet Take 1-2 tablets by mouth At Night As Needed for Sleep. 19  Yes Michael Quiñonez MD   aspirin 81 MG EC tablet Take 81 mg by mouth Daily.   Yes ProviderKiley MD   atorvastatin (LIPITOR) 40 MG tablet Take 1 tablet by mouth Daily. 19  Yes Michael Quiñonez MD   carvedilol (COREG) 12.5 MG tablet Take 1 tablet by mouth 2 (Two) Times a Day. 19  Yes Michael Quiñonez MD   cholecalciferol (VITAMIN D3) 1000 UNITS tablet Take 1,000 Units by mouth Daily.   Yes ProviderKiley MD   FLUoxetine (PROzac) 10 MG capsule Take 3 capsules  by mouth Every Morning. 2/20/19  Yes Michael Quiñonez MD   glucose blood (ONETOUCH VERIO) test strip Check glucose daily  E11.9 10/1/18  Yes Michael Quiñonez MD   hyoscyamine (ANASPAZ,LEVSIN) 0.125 MG tablet Take 1 tablet by mouth 4 (Four) Times a Day With Meals & at Bedtime. 3/19/19  Yes Moira Ponce APRN   Lancets 30G misc Check one time daily E11.9 For Verio glucometer 10/1/18  Yes Michael Quiñonez MD   lisinopril (PRINIVIL,ZESTRIL) 20 MG tablet Take 1 tablet by mouth Daily. For blood pressure 2/20/19  Yes Michael Quiñonez MD   loratadine (CLARITIN) 10 MG tablet Take 10 mg by mouth Daily.   Yes Kiley Sifuentes MD   omeprazole (priLOSEC) 40 MG capsule Take 20 mg by mouth Every Morning.   Yes Kiley Sifuentes MD   POTASSIUM CHLORIDE ER PO Take 100 mEq/day by mouth Daily.   Yes Kiley Sifuentes MD   Semaglutide (OZEMPIC) 0.25 or 0.5 MG/DOSE solution pen-injector Inject 0.5 mg under the skin into the appropriate area as directed 1 (One) Time Per Week. 2/20/19  Yes Michael Quiñonez MD   spironolactone (ALDACTONE) 25 MG tablet Take 1 tablet by mouth 2 (Two) Times a Day. 2/20/19  Yes Michael Quiñonez MD   triamcinolone (KENALOG) 0.1 % ointment Apply  topically to the appropriate area as directed Every Night. 9/24/18  Yes Priti Hernandez APRN   vitamin B-12 (CYANOCOBALAMIN) 500 MCG tablet Take 2,500 mcg by mouth Every Other Day.   Yes ProviderKiley MD   vitamin C (ASCORBIC ACID) 500 MG tablet Take 500 mg by mouth Daily.   Yes ProviderKiley MD     Allergies   Allergen Reactions   • Jardiance [Empagliflozin] Other (See Comments)     Vaginitis     • Metformin Diarrhea   • Nsaids      Due to only one kidney   • Tolmetin Other (See Comments)     Due to only one kidney     Social History     Socioeconomic History   • Marital status:      Spouse name: Not on file   • Number of children: Not on file   • Years of education: Not on file   • Highest education level: Not on file   Tobacco  "Use   • Smoking status: Former Smoker     Last attempt to quit:      Years since quittin.3   • Smokeless tobacco: Never Used   Substance and Sexual Activity   • Alcohol use: No   • Drug use: No   • Sexual activity: No     Comment:  for 10 years       Review of Systems  Review of Systems   Constitutional: Negative for activity change, appetite change, chills, diaphoresis, fatigue, fever and unexpected weight change.   HENT: Negative for sore throat and trouble swallowing.    Respiratory: Negative for shortness of breath.    Cardiovascular: Negative for chest pain.   Gastrointestinal: Positive for abdominal pain and diarrhea (variable 1 time per day then episodes after meals). Negative for abdominal distention, anal bleeding, anorexia, blood in stool, change in bowel habit, constipation, nausea, rectal pain and vomiting.   Musculoskeletal: Negative for arthralgias.   Skin: Negative for pallor.   Neurological: Negative for light-headedness.        /82 (BP Location: Left arm)   Pulse 81   Ht 162.6 cm (64\")   Wt 92.8 kg (204 lb 9.6 oz)   LMP  (LMP Unknown) Comment: Hyst w/ BSO in   BMI 35.12 kg/m²     Objective    Physical Exam   Constitutional: She is oriented to person, place, and time. She appears well-developed and well-nourished. She is cooperative. No distress.   HENT:   Head: Normocephalic and atraumatic.   Neck: Normal range of motion. Neck supple. No thyromegaly present.   Cardiovascular: Normal rate, regular rhythm and normal heart sounds.   Pulmonary/Chest: Effort normal and breath sounds normal. She has no wheezes. She has no rhonchi. She has no rales.   Abdominal: Soft. Normal appearance and bowel sounds are normal. She exhibits no distension. There is no hepatosplenomegaly. There is generalized tenderness. There is no rigidity and no guarding. No hernia.   Lymphadenopathy:     She has no cervical adenopathy.   Neurological: She is alert and oriented to person, place, and time. "   Skin: Skin is warm, dry and intact. No rash noted. No pallor.   Psychiatric: She has a normal mood and affect. Her speech is normal.     Lab on 03/19/2019   Component Date Value Ref Range Status   • Class Description 03/19/2019 Comment   Final        Levels of Specific IgE       Class  Description of Class      ---------------------------  -----  --------------------                     < 0.10         0         Negative             0.10 -    0.31         0/I       Equivocal/Low             0.32 -    0.55         I         Low             0.56 -    1.40         II        Moderate             1.41 -    3.90         III       High             3.91 -   19.00         IV        Very High            19.01 -  100.00         V         Very High                    >100.00         VI        Very High   • Egg White 03/19/2019 <0.10  Class 0 kU/L Final   • Milk, Cow's 03/19/2019 <0.10  Class 0 kU/L Final   • CodFish 03/19/2019 <0.10  Class 0 kU/L Final   • Sesame Seed 03/19/2019 <0.10  Class 0 kU/L Final   • Peanut 03/19/2019 <0.10  Class 0 kU/L Final   • Soybean 03/19/2019 <0.10  Class 0 kU/L Final   • Hazelnut 03/19/2019 <0.10  Class 0 kU/L Final   • Shrimp 03/19/2019 <0.10  Class 0 kU/L Final   • Scallop 03/19/2019 <0.10  Class 0 kU/L Final   • Gluten 03/19/2019 <0.10  Class 0 kU/L Final   • Fedscreek 03/19/2019 <0.10  Class 0 kU/L Final   • Wheat 03/19/2019 <0.10  Class 0 kU/L Final   • Gliadin Deamidated Peptide Ab, IgA 03/19/2019 2  0 - 19 units Final                       Negative                   0 - 19                     Weak Positive             20 - 30                     Moderate to Strong Positive   >30   • Deaminated Gliadin Ab IgG 03/19/2019 3  0 - 19 units Final                       Negative                   0 - 19                     Weak Positive             20 - 30                     Moderate to Strong Positive   >30   • Tissue Transglutaminase IgA 03/19/2019 <2  0 - 3 U/mL Final                                   Negative        0 -  3                                Weak Positive   4 - 10                                Positive           >10   Tissue Transglutaminase (tTG) has been identified   as the endomysial antigen.  Studies have demonstr-   ated that endomysial IgA antibodies have over 99%   specificity for gluten sensitive enteropathy.   • Tissue Transglutaminase IgG 03/19/2019 <2  0 - 5 U/mL Final                                  Negative        0 - 5                                Weak Positive   6 - 9                                Positive           >9     Assessment/Plan      1. Irritable bowel syndrome with diarrhea    .       Orders placed during this encounter include:  No orders of the defined types were placed in this encounter.      * Surgery not found *    Review and/or summary of lab tests, radiology, procedures, medications. Review and summary of old records and obtaining of history. The risks and benefits of my recommendations, as well as other treatment options were discussed with the patient today. Questions were answered.    New Medications Ordered This Visit   Medications   • rifaximin (XIFAXAN) 550 MG tablet     Sig: Take 1 tablet by mouth 3 (Three) Times a Day.     Dispense:  42 tablet     Refill:  1       Follow-up: Return in about 8 weeks (around 6/12/2019).          This document has been electronically signed by ISMAEL Leone on April 19, 2019 2:11 PM             Results for orders placed or performed in visit on 03/19/19   Glia(IgA / G) & TTG(IgA / G)   Result Value Ref Range    Gliadin Deamidated Peptide Ab, IgA 2 0 - 19 units    Deaminated Gliadin Ab IgG 3 0 - 19 units    Tissue Transglutaminase IgA <2 0 - 3 U/mL    Tissue Transglutaminase IgG <2 0 - 5 U/mL   Allergens (12) Foods   Result Value Ref Range    Class Description Comment     Egg White <0.10 Class 0 kU/L    Milk, Cow's <0.10 Class 0 kU/L    CodFish <0.10 Class 0 kU/L    Sesame Seed <0.10 Class 0 kU/L    Peanut <0.10  Class 0 kU/L    Soybean <0.10 Class 0 kU/L    Hazelnut <0.10 Class 0 kU/L    Shrimp <0.10 Class 0 kU/L    Scallop <0.10 Class 0 kU/L    Gluten <0.10 Class 0 kU/L    Clay <0.10 Class 0 kU/L    Wheat <0.10 Class 0 kU/L   Results for orders placed or performed in visit on 02/13/19   Scan Slide   Result Value Ref Range    Scan Slide     CBC Auto Differential   Result Value Ref Range    WBC 7.63 3.20 - 9.80 10*3/mm3    RBC 4.69 3.77 - 5.16 10*6/mm3    Hemoglobin 14.2 12.0 - 15.5 g/dL    Hematocrit 44.1 35.0 - 45.0 %    MCV 94.0 80.0 - 98.0 fL    MCH 30.3 26.5 - 34.0 pg    MCHC 32.2 31.4 - 36.0 g/dL    RDW 15.2 (H) 11.5 - 14.5 %    RDW-SD 50.6 (H) 36.4 - 46.3 fl    MPV 9.9 8.0 - 12.0 fL    Platelets 111 (L) 150 - 450 10*3/mm3   Vitamin D 25 Hydroxy   Result Value Ref Range    25 Hydroxy, Vitamin D 48.6 30.0 - 100.0 ng/ml   Manual Differential   Result Value Ref Range    Neutrophil % 34.0 (L) 37.0 - 80.0 %    Lymphocyte % 57.0 (H) 10.0 - 50.0 %    Monocyte % 8.0 0.0 - 12.0 %    Basophil % 1.0 0.0 - 2.0 %    Neutrophils Absolute 2.59 2.00 - 8.60 10*3/mm3    Lymphocytes Absolute 4.35 (H) 0.60 - 4.20 10*3/mm3    Monocytes Absolute 0.61 0.00 - 0.90 10*3/mm3    Basophils Absolute 0.08 0.00 - 0.20 10*3/mm3    Anisocytosis Slight/1+ None Seen    WBC Morphology Normal Normal    Platelet Estimate Decreased Normal   LDL Cholesterol, Direct   Result Value Ref Range    LDL Cholesterol  85 1 - 129 mg/dL   Hemoglobin A1c   Result Value Ref Range    Hemoglobin A1C 5.8 (H) 4 - 5.6 %   Ferritin   Result Value Ref Range    Ferritin 25.80 11.10 - 264.00 ng/mL   Vitamin B12   Result Value Ref Range    Vitamin B-12 688 239 - 931 pg/mL   Comprehensive Metabolic Panel   Result Value Ref Range    Glucose 99 74 - 99 mg/dL    BUN 16 7 - 17 mg/dL    Creatinine 1.13 (H) 0.52 - 1.04 mg/dL    Sodium 143 137 - 145 mmol/L    Potassium 4.7 3.4 - 5.0 mmol/L    Chloride 105 98 - 107 mmol/L    CO2 31.0 (H) 22.0 - 30.0 mmol/L    Calcium 9.3 8.4 - 10.2 mg/dL     Total Protein 7.2 6.3 - 8.2 g/dL    Albumin 4.50 3.50 - 5.00 g/dL    ALT (SGPT) 18 <=35 U/L    AST (SGOT) 21 14 - 36 U/L    Alkaline Phosphatase 100 38 - 126 U/L    Total Bilirubin 2.7 (H) 0.2 - 1.3 mg/dL    eGFR Non  Amer 48 45 - 104 mL/min/1.73    Globulin 2.7 2.3 - 3.5 gm/dL    A/G Ratio 1.7 1.1 - 1.8 g/dL    BUN/Creatinine Ratio 14.2 7.0 - 25.0    Anion Gap 7.0 5.0 - 15.0 mmol/L   Results for orders placed or performed in visit on 09/24/18   Liquid-based Pap Smear, Screening   Result Value Ref Range    Case Report       Gynecologic Cytology Report                       Case: [id number]                                  Authorizing Provider:  Priti Hernandez    Collected:           09/24/2018 09:45 AM                                 ISMAEL Pizarro                                                                  Ordering Location:     [facility]     Received:            09/24/2018 01:48 PM                                 [facility]                                                               First Screen:          Hortencia Olson                                                          Specimen:    Liquid-Based Pap, Screening, Vagina                                                        Interpretation Negative for intraepithelial lesion or malignancy      Other Findings Atrophy with inflammation     Specimen Adequacy Satisfactory for evaluation     Additional Information       Disclaimer: Cervical cytology is a screening test primarily for squamous cancer and its precursors and has associated false-negative and false-positive results.  Technologies such as liquid-based preparations may decrease but will not eliminate all false-negative results.  Follow-up of unexplained clinical signs and symptoms is recommended to minimize false-negative results. (The [place] System for Reporting Cervical Cytology: [place], 2015).     Results for orders placed or performed in visit on  08/07/18   CBC Auto Differential   Result Value Ref Range    WBC 4.05 3.20 - 9.80 10*3/mm3    RBC 4.56 3.77 - 5.16 10*6/mm3    Hemoglobin 13.7 12.0 - 15.5 g/dL    Hematocrit 42.4 35.0 - 45.0 %    MCV 93.0 80.0 - 98.0 fL    MCH 30.0 26.5 - 34.0 pg    MCHC 32.3 31.4 - 36.0 g/dL    RDW 15.2 (H) 11.5 - 14.5 %    RDW-SD 49.8 (H) 36.4 - 46.3 fl    MPV 10.1 8.0 - 12.0 fL    Platelets 119 (L) 150 - 450 10*3/mm3    Neutrophil % 41.5 37.0 - 80.0 %    Lymphocyte % 48.4 10.0 - 50.0 %    Monocyte % 7.2 0.0 - 12.0 %    Eosinophil % 2.2 0.0 - 7.0 %    Basophil % 0.7 0.0 - 2.0 %    Neutrophils, Absolute 1.68 (L) 2.00 - 8.60 10*3/mm3    Lymphocytes, Absolute 1.96 0.60 - 4.20 10*3/mm3    Monocytes, Absolute 0.29 0.00 - 0.90 10*3/mm3    Eosinophils, Absolute 0.09 0.00 - 0.70 10*3/mm3    Basophils, Absolute 0.03 0.00 - 0.20 10*3/mm3   Vitamin D 25 Hydroxy   Result Value Ref Range    25 Hydroxy, Vitamin D 51.9 30.0 - 100.0 ng/ml   TSH   Result Value Ref Range    TSH 1.280 0.460 - 4.680 mIU/mL   Magnesium   Result Value Ref Range    Magnesium 1.7 1.6 - 2.3 mg/dL   Hemoglobin A1c   Result Value Ref Range    Hemoglobin A1C 6.4 (H) 4 - 5.6 %   Vitamin B12   Result Value Ref Range    Vitamin B-12 763 239 - 931 pg/mL   Lipid Panel   Result Value Ref Range    Total Cholesterol 144 (L) 150 - 200 mg/dL    Triglycerides 178 (H) <=150 mg/dL    HDL Cholesterol 35 (L) 40 - 59 mg/dL    LDL Cholesterol  73 <=100 mg/dL    VLDL Cholesterol 35.6 mg/dL    LDL/HDL Ratio 2.10 0.00 - 3.22     *Note: Due to a large number of results and/or encounters for the requested time period, some results have not been displayed. A complete set of results can be found in Results Review.

## 2019-05-15 DIAGNOSIS — Z12.31 ENCOUNTER FOR SCREENING MAMMOGRAM FOR MALIGNANT NEOPLASM OF BREAST: Primary | ICD-10-CM

## 2019-05-17 ENCOUNTER — OFFICE VISIT (OUTPATIENT)
Dept: GASTROENTEROLOGY | Facility: CLINIC | Age: 64
End: 2019-05-17

## 2019-05-17 VITALS
WEIGHT: 202.2 LBS | BODY MASS INDEX: 34.52 KG/M2 | SYSTOLIC BLOOD PRESSURE: 142 MMHG | DIASTOLIC BLOOD PRESSURE: 88 MMHG | HEIGHT: 64 IN | HEART RATE: 84 BPM

## 2019-05-17 DIAGNOSIS — K58.0 IRRITABLE BOWEL SYNDROME WITH DIARRHEA: Primary | ICD-10-CM

## 2019-05-17 PROCEDURE — 99213 OFFICE O/P EST LOW 20 MIN: CPT | Performed by: NURSE PRACTITIONER

## 2019-05-17 NOTE — PROGRESS NOTES
Chief Complaint   Patient presents with   • Diarrhea       Subjective    Chio Mary is a 63 y.o. female. she is here today for follow-up.    History of Present Illness  63-year-old female presents for follow-up regarding irritable bowel syndrome diarrhea predominant.  She finished 2-week course of Xifaxan about 2 weeks ago and states she has done well.  States bowel habits have improved about 2 times per day and she is able to go to her grandchildren's ball games without having episodes of diarrhea and incontinence.  She previously underwent colonoscopy 4/6/2018 with repeat recommended in 3 years for surveillance due to polypectomy.  Plan; discussed dietary modifications for irritable bowel syndrome informational sheet provided along with recommendation to cross reference Internet search about low FODMAPs diet.  Follow-up in 6 months for recheck may refill Xifaxan if flare of symptoms.  Return office sooner if needed       The following portions of the patient's history were reviewed and updated as appropriate:   Past Medical History:   Diagnosis Date   • Abnormal liver function     improved   • Anxiety    • Blood in feces    • Chronic lymphatic leukemia (CMS/HCC)     Chronic lymphoid leukemia, disease - CLL, Dr. Mccullough was following. Care transferred to Dr. Michaels 11/2014. Care transferred to Dr. Soriano, 11/2015      • Degenerative joint disease involving multiple joints     left foot pain aggravated by daily walking for exercise   • Depressive disorder     anxiety. on fluoxetine   • Epigastric pain    • Essential hypertension     severe. improved with weight loss   • Gastritis    • Generalized anxiety disorder     on xanax at bedtime. on fluoxetine. stopped buspar 2/2016   • H/O bone density study 11/11/2014    x2; 11/11/14 - Persistent osteopenia. Lumbar improved 11%.; 06/2010 - revealed osteopenia   • History of colon polyps     x2 3/2010   • History of transfusion    • Hypokalemia     on oral  potassium and spironolactone   • Hypomagnesemia     started magox 7/2016   • Iron deficiency anemia secondary to blood loss (chronic)     much improved after iron infusions   • Migraine     h/o   • Obesity    • Osteopenia     next DEXA due 11/2016   • Other dietary vitamin B12 deficiency anemia 8/14/2018   • Primary malignant neoplasm of kidney (CMS/HCC)     renal cell carcinoma s/p R nephrectomy 4/2003   • Primary malignant neoplasm of right kidney - R nephrectomy 2003     renal cell carcinoma s/p R nephrectomy 4/2003   • Prolapsed thoracic intervertebral disc     Prolapsed intervertebral disc without myelopathy - T8, T9      • Pure hypercholesterolemia     changed zocor and zetia to lipitor 11/2014   • Sleep disorder     on xanax   • Tobacco use     tobacco use and exposure - quit 2002   • Tubular adenoma     h/o 2010   • Type 2 diabetes mellitus (CMS/HCC)     on low-dose metformin. higher dose metformin produced diarrhea   • Varicose veins of both lower extremities 8/14/2018   • Vitamin D deficiency     on oral vit D   • Yeast infection      Past Surgical History:   Procedure Laterality Date   • BACK SURGERY      low back disk surgery   • CHOLECYSTECTOMY     • COLONOSCOPY  03/22/2010    Two polyps were found in the cecum and ascending colon. Internal and external hemorrhoids were found.    • COLONOSCOPY N/A 4/6/2018    Procedure: COLONOSCOPY;  Surgeon: Lm Gutierrez MD;  Location: [facility] ENDOSCOPY;  Service: Gastroenterology   • COLONOSCOPY W/ POLYPECTOMY  02/13/2015    1 polyp in sigmoid colon; removed by snare cautery polypectomy. Internal & external hemorrhoids found.   • ENDOSCOPY W/ PEG TUBE PLACEMENT  02/13/2015    Normal esophagus. Gastritis in stomach. Biopsy taken. Normal duodenum. Biopsy taken.   • INGUINAL HERNIA REPAIR LAPAROSCOPIC AND UMBILICAL HERNIA REPAIR Right 01/2004    R inguinal, umbilical hernia repair   • INJECTION OF MEDICATION  06/23/2014    depo medrol 80mg   • INJECTION OF MEDICATION   2015    IV Infusion, Med    • INJECTION OF MEDICATION  2014    rocephin   • JOINT REPLACEMENT     • NEPHRECTOMY Right     s/p R nephrectomy  Dr. Franke secondary to RCCA   • OTHER SURGICAL HISTORY  2013    Inj(s) Tend-Sheath, Ligament, Single     • OTHER SURGICAL HISTORY  10/23/2013    Small Joint Injection/Aspiration     • OVARIAN CYST SURGERY     • TOTAL ABDOMINAL HYSTERECTOMY WITH SALPINGO OOPHORECTOMY     • TOTAL HIP ARTHROPLASTY      s/p R 2009, s/p L 2009   • VARICOSE VEIN SURGERY      phleb veins - extrem (to ) - Varicose veins, left lower extremity. Stripping and ligation fo saphenous varicose veins, left thigh and left lower lip.     Family History   Problem Relation Age of Onset   • Pancreatic cancer Mother    • Colon cancer Other         strong family h/o   • Cancer Other    • Thyroid disease Other    • Breast cancer Sister      OB History      Para Term  AB Living    2 2 2          SAB TAB Ectopic Molar Multiple Live Births                       Prior to Admission medications    Medication Sig Start Date End Date Taking? Authorizing Provider   ALPRAZolam (XANAX) 0.5 MG tablet Take 1-2 tablets by mouth At Night As Needed for Sleep. 19  Yes Michael Quiñonez MD   aspirin 81 MG EC tablet Take 81 mg by mouth Daily.   Yes Provider, MD Kiley   atorvastatin (LIPITOR) 40 MG tablet Take 1 tablet by mouth Daily. 19  Yes Michael Quiñonez MD   carvedilol (COREG) 12.5 MG tablet Take 1 tablet by mouth 2 (Two) Times a Day. 19  Yes Michael Quiñonez MD   cholecalciferol (VITAMIN D3) 1000 UNITS tablet Take 1,000 Units by mouth Daily.   Yes Provider, MD Kiley   FLUoxetine (PROzac) 10 MG capsule Take 3 capsules by mouth Every Morning. 19  Yes Michael Quiñonez MD   glucose blood (ONETOUCH VERIO) test strip Check glucose daily  E11.9 10/1/18  Yes Michael Quiñonez MD   hyoscyamine (ANASPAZ,LEVSIN) 0.125 MG tablet Take 1 tablet by mouth 4  (Four) Times a Day With Meals & at Bedtime. 3/19/19  Yes Moira Ponce APRN   Lancets 30G misc Check one time daily E11.9 For Verio glucometer 10/1/18  Yes Michael Quiñonez MD   lisinopril (PRINIVIL,ZESTRIL) 20 MG tablet Take 1 tablet by mouth Daily. For blood pressure 19  Yes Michael Quiñonez MD   loratadine (CLARITIN) 10 MG tablet Take 10 mg by mouth Daily.   Yes Kiley Sifuentes MD   omeprazole (priLOSEC) 40 MG capsule Take 20 mg by mouth Every Morning.   Yes Kiley Sifuentes MD   POTASSIUM CHLORIDE ER PO Take 100 mEq/day by mouth Daily.   Yes Kiley Sifuentes MD   rifaximin (XIFAXAN) 550 MG tablet Take 1 tablet by mouth 3 (Three) Times a Day. 19  Yes Moira Ponce APRN   Semaglutide (OZEMPIC) 0.25 or 0.5 MG/DOSE solution pen-injector Inject 0.5 mg under the skin into the appropriate area as directed 1 (One) Time Per Week. 19  Yes Michael Quiñonez MD   spironolactone (ALDACTONE) 25 MG tablet Take 1 tablet by mouth 2 (Two) Times a Day. 19  Yes Michael Quiñonez MD   triamcinolone (KENALOG) 0.1 % ointment Apply  topically to the appropriate area as directed Every Night. 18  Yes Priti Hernandez APRN   vitamin B-12 (CYANOCOBALAMIN) 500 MCG tablet Take 2,500 mcg by mouth Every Other Day.   Yes Kiley Sifuentes MD   vitamin C (ASCORBIC ACID) 500 MG tablet Take 500 mg by mouth Daily.   Yes ProviderKiley MD     Allergies   Allergen Reactions   • Jardiance [Empagliflozin] Other (See Comments)     Vaginitis     • Metformin Diarrhea   • Nsaids      Due to only one kidney   • Tolmetin Other (See Comments)     Due to only one kidney     Social History     Socioeconomic History   • Marital status:      Spouse name: Not on file   • Number of children: Not on file   • Years of education: Not on file   • Highest education level: Not on file   Tobacco Use   • Smoking status: Former Smoker     Last attempt to quit:      Years since quittin.3   • Smokeless  "tobacco: Never Used   Substance and Sexual Activity   • Alcohol use: No   • Drug use: No   • Sexual activity: No     Comment:  for 10 years       Review of Systems  Review of Systems   Constitutional: Negative for activity change, appetite change, chills, diaphoresis, fatigue, fever and unexpected weight change.   HENT: Negative for sore throat and trouble swallowing.    Respiratory: Negative for shortness of breath.    Gastrointestinal: Positive for diarrhea. Negative for abdominal distention, abdominal pain, anal bleeding, blood in stool, constipation, nausea, rectal pain and vomiting.   Musculoskeletal: Negative for arthralgias.   Skin: Negative for pallor.   Neurological: Negative for light-headedness.        /88 (BP Location: Left arm)   Pulse 84   Ht 162.6 cm (64\")   Wt 91.7 kg (202 lb 3.2 oz)   LMP  (LMP Unknown) Comment: Hyst w/ BSO in 1990  BMI 34.71 kg/m²     Objective    Physical Exam   Constitutional: She is oriented to person, place, and time. She appears well-developed and well-nourished. She is cooperative. No distress.   HENT:   Head: Normocephalic and atraumatic.   Neck: Normal range of motion. Neck supple. No thyromegaly present.   Cardiovascular: Normal rate, regular rhythm and normal heart sounds.   Pulmonary/Chest: Effort normal and breath sounds normal. She has no wheezes. She has no rhonchi. She has no rales.   Abdominal: Soft. Normal appearance and bowel sounds are normal. She exhibits no shifting dullness, no distension, no fluid wave and no ascites. There is no hepatosplenomegaly. There is no tenderness. There is no rigidity and no guarding. No hernia.   Lymphadenopathy:     She has no cervical adenopathy.   Neurological: She is alert and oriented to person, place, and time.   Skin: Skin is warm, dry and intact. No rash noted. No pallor.   Psychiatric: She has a normal mood and affect. Her speech is normal.     Lab on 03/19/2019   Component Date Value Ref Range Status   • " Class Description 03/19/2019 Comment   Final        Levels of Specific IgE       Class  Description of Class      ---------------------------  -----  --------------------                     < 0.10         0         Negative             0.10 -    0.31         0/I       Equivocal/Low             0.32 -    0.55         I         Low             0.56 -    1.40         II        Moderate             1.41 -    3.90         III       High             3.91 -   19.00         IV        Very High            19.01 -  100.00         V         Very High                    >100.00         VI        Very High   • Egg White 03/19/2019 <0.10  Class 0 kU/L Final   • Milk, Cow's 03/19/2019 <0.10  Class 0 kU/L Final   • CodFish 03/19/2019 <0.10  Class 0 kU/L Final   • Sesame Seed 03/19/2019 <0.10  Class 0 kU/L Final   • Peanut 03/19/2019 <0.10  Class 0 kU/L Final   • Soybean 03/19/2019 <0.10  Class 0 kU/L Final   • Hazelnut 03/19/2019 <0.10  Class 0 kU/L Final   • Shrimp 03/19/2019 <0.10  Class 0 kU/L Final   • Scallop 03/19/2019 <0.10  Class 0 kU/L Final   • Gluten 03/19/2019 <0.10  Class 0 kU/L Final   • [place] 03/19/2019 <0.10  Class 0 kU/L Final   • Wheat 03/19/2019 <0.10  Class 0 kU/L Final   • Gliadin Deamidated Peptide Ab, IgA 03/19/2019 2  0 - 19 units Final                       Negative                   0 - 19                     Weak Positive             20 - 30                     Moderate to Strong Positive   >30   • Deaminated Gliadin Ab IgG 03/19/2019 3  0 - 19 units Final                       Negative                   0 - 19                     Weak Positive             20 - 30                     Moderate to Strong Positive   >30   • Tissue Transglutaminase IgA 03/19/2019 <2  0 - 3 U/mL Final                                  Negative        0 -  3                                Weak Positive   4 - 10                                Positive           >10   Tissue Transglutaminase (tTG) has been identified   as the  endomysial antigen.  Studies have demonstr-   ated that endomysial IgA antibodies have over 99%   specificity for gluten sensitive enteropathy.   • Tissue Transglutaminase IgG 03/19/2019 <2  0 - 5 U/mL Final                                  Negative        0 - 5                                Weak Positive   6 - 9                                Positive           >9     Assessment/Plan      1. Irritable bowel syndrome with diarrhea    .       Orders placed during this encounter include:  No orders of the defined types were placed in this encounter.      * Surgery not found *    Review and/or summary of lab tests, radiology, procedures, medications. Review and summary of old records and obtaining of history. The risks and benefits of my recommendations, as well as other treatment options were discussed with the patient today. Questions were answered.    No orders of the defined types were placed in this encounter.      Follow-up: Return in about 6 months (around 11/17/2019).          This document has been electronically signed by ISMAEL Leone on May 17, 2019 1:24 PM             Results for orders placed or performed in visit on 03/19/19   Glia(IgA / G) & TTG(IgA / G)   Result Value Ref Range    Gliadin Deamidated Peptide Ab, IgA 2 0 - 19 units    Deaminated Gliadin Ab IgG 3 0 - 19 units    Tissue Transglutaminase IgA <2 0 - 3 U/mL    Tissue Transglutaminase IgG <2 0 - 5 U/mL   Allergens (12) Foods   Result Value Ref Range    Class Description Comment     Egg White <0.10 Class 0 kU/L    Milk, Cow's <0.10 Class 0 kU/L    CodFish <0.10 Class 0 kU/L    Sesame Seed <0.10 Class 0 kU/L    Peanut <0.10 Class 0 kU/L    Soybean <0.10 Class 0 kU/L    Hazelnut <0.10 Class 0 kU/L    Shrimp <0.10 Class 0 kU/L    Scallop <0.10 Class 0 kU/L    Gluten <0.10 Class 0 kU/L    [place] <0.10 Class 0 kU/L    Wheat <0.10 Class 0 kU/L   Results for orders placed or performed in visit on 02/13/19   Scan Slide   Result Value Ref Range     Scan Slide     CBC Auto Differential   Result Value Ref Range    WBC 7.63 3.20 - 9.80 10*3/mm3    RBC 4.69 3.77 - 5.16 10*6/mm3    Hemoglobin 14.2 12.0 - 15.5 g/dL    Hematocrit 44.1 35.0 - 45.0 %    MCV 94.0 80.0 - 98.0 fL    MCH 30.3 26.5 - 34.0 pg    MCHC 32.2 31.4 - 36.0 g/dL    RDW 15.2 (H) 11.5 - 14.5 %    RDW-SD 50.6 (H) 36.4 - 46.3 fl    MPV 9.9 8.0 - 12.0 fL    Platelets 111 (L) 150 - 450 10*3/mm3   Vitamin D 25 Hydroxy   Result Value Ref Range    25 Hydroxy, Vitamin D 48.6 30.0 - 100.0 ng/ml   Manual Differential   Result Value Ref Range    Neutrophil % 34.0 (L) 37.0 - 80.0 %    Lymphocyte % 57.0 (H) 10.0 - 50.0 %    Monocyte % 8.0 0.0 - 12.0 %    Basophil % 1.0 0.0 - 2.0 %    Neutrophils Absolute 2.59 2.00 - 8.60 10*3/mm3    Lymphocytes Absolute 4.35 (H) 0.60 - 4.20 10*3/mm3    Monocytes Absolute 0.61 0.00 - 0.90 10*3/mm3    Basophils Absolute 0.08 0.00 - 0.20 10*3/mm3    Anisocytosis Slight/1+ None Seen    WBC Morphology Normal Normal    Platelet Estimate Decreased Normal   LDL Cholesterol, Direct   Result Value Ref Range    LDL Cholesterol  85 1 - 129 mg/dL   Hemoglobin A1c   Result Value Ref Range    Hemoglobin A1C 5.8 (H) 4 - 5.6 %   Ferritin   Result Value Ref Range    Ferritin 25.80 11.10 - 264.00 ng/mL   Vitamin B12   Result Value Ref Range    Vitamin B-12 688 239 - 931 pg/mL   Comprehensive Metabolic Panel   Result Value Ref Range    Glucose 99 74 - 99 mg/dL    BUN 16 7 - 17 mg/dL    Creatinine 1.13 (H) 0.52 - 1.04 mg/dL    Sodium 143 137 - 145 mmol/L    Potassium 4.7 3.4 - 5.0 mmol/L    Chloride 105 98 - 107 mmol/L    CO2 31.0 (H) 22.0 - 30.0 mmol/L    Calcium 9.3 8.4 - 10.2 mg/dL    Total Protein 7.2 6.3 - 8.2 g/dL    Albumin 4.50 3.50 - 5.00 g/dL    ALT (SGPT) 18 <=35 U/L    AST (SGOT) 21 14 - 36 U/L    Alkaline Phosphatase 100 38 - 126 U/L    Total Bilirubin 2.7 (H) 0.2 - 1.3 mg/dL    eGFR Non  Amer 48 45 - 104 mL/min/1.73    Globulin 2.7 2.3 - 3.5 gm/dL    A/G Ratio 1.7 1.1 - 1.8 g/dL     BUN/Creatinine Ratio 14.2 7.0 - 25.0    Anion Gap 7.0 5.0 - 15.0 mmol/L   Results for orders placed or performed in visit on 09/24/18   Liquid-based Pap Smear, Screening   Result Value Ref Range    Case Report       Gynecologic Cytology Report                       Case: [id number]                                  Authorizing Provider:  Priti Hernandez    Collected:           09/24/2018 09:45 AM                                 ISMAEL Pizarro                                                                  Ordering Location:     [facility]     Received:            09/24/2018 01:48 PM                                 [facility]                                                               First Screen:          Hortencia Olson                                                          Specimen:    Liquid-Based Pap, Screening, Vagina                                                        Interpretation Negative for intraepithelial lesion or malignancy      Other Findings Atrophy with inflammation     Specimen Adequacy Satisfactory for evaluation     Additional Information       Disclaimer: Cervical cytology is a screening test primarily for squamous cancer and its precursors and has associated false-negative and false-positive results.  Technologies such as liquid-based preparations may decrease but will not eliminate all false-negative results.  Follow-up of unexplained clinical signs and symptoms is recommended to minimize false-negative results. (The [place] System for Reporting Cervical Cytology: Anderson, 2015).     Results for orders placed or performed in visit on 08/07/18   CBC Auto Differential   Result Value Ref Range    WBC 4.05 3.20 - 9.80 10*3/mm3    RBC 4.56 3.77 - 5.16 10*6/mm3    Hemoglobin 13.7 12.0 - 15.5 g/dL    Hematocrit 42.4 35.0 - 45.0 %    MCV 93.0 80.0 - 98.0 fL    MCH 30.0 26.5 - 34.0 pg    MCHC 32.3 31.4 - 36.0 g/dL    RDW 15.2 (H) 11.5 - 14.5 %    RDW-SD 49.8 (H)  36.4 - 46.3 fl    MPV 10.1 8.0 - 12.0 fL    Platelets 119 (L) 150 - 450 10*3/mm3    Neutrophil % 41.5 37.0 - 80.0 %    Lymphocyte % 48.4 10.0 - 50.0 %    Monocyte % 7.2 0.0 - 12.0 %    Eosinophil % 2.2 0.0 - 7.0 %    Basophil % 0.7 0.0 - 2.0 %    Neutrophils, Absolute 1.68 (L) 2.00 - 8.60 10*3/mm3    Lymphocytes, Absolute 1.96 0.60 - 4.20 10*3/mm3    Monocytes, Absolute 0.29 0.00 - 0.90 10*3/mm3    Eosinophils, Absolute 0.09 0.00 - 0.70 10*3/mm3    Basophils, Absolute 0.03 0.00 - 0.20 10*3/mm3   Vitamin D 25 Hydroxy   Result Value Ref Range    25 Hydroxy, Vitamin D 51.9 30.0 - 100.0 ng/ml   TSH   Result Value Ref Range    TSH 1.280 0.460 - 4.680 mIU/mL   Magnesium   Result Value Ref Range    Magnesium 1.7 1.6 - 2.3 mg/dL   Hemoglobin A1c   Result Value Ref Range    Hemoglobin A1C 6.4 (H) 4 - 5.6 %   Vitamin B12   Result Value Ref Range    Vitamin B-12 763 239 - 931 pg/mL   Lipid Panel   Result Value Ref Range    Total Cholesterol 144 (L) 150 - 200 mg/dL    Triglycerides 178 (H) <=150 mg/dL    HDL Cholesterol 35 (L) 40 - 59 mg/dL    LDL Cholesterol  73 <=100 mg/dL    VLDL Cholesterol 35.6 mg/dL    LDL/HDL Ratio 2.10 0.00 - 3.22     *Note: Due to a large number of results and/or encounters for the requested time period, some results have not been displayed. A complete set of results can be found in Results Review.

## 2019-05-17 NOTE — PATIENT INSTRUCTIONS
Diarrhea, Adult  Diarrhea is frequent loose and watery bowel movements. Diarrhea can make you feel weak and cause you to become dehydrated. Dehydration can make you tired and thirsty, cause you to have a dry mouth, and decrease how often you urinate. Diarrhea typically lasts 2-3 days. However, it can last longer if it is a sign of something more serious. It is important to treat your diarrhea as told by your health care provider.  Follow these instructions at home:  Eating and drinking  Follow these recommendations as told by your health care provider:  · Take an oral rehydration solution (ORS). This is a drink that is sold at pharmacies and retail stores.  · Drink clear fluids, such as water, ice chips, diluted fruit juice, and low-calorie sports drinks.  · Eat bland, easy-to-digest foods in small amounts as you are able. These foods include bananas, applesauce, rice, lean meats, toast, and crackers.  · Avoid drinking fluids that contain a lot of sugar or caffeine, such as energy drinks, sports drinks, and soda.  · Avoid alcohol.  · Avoid spicy or fatty foods.    General instructions  · Drink enough fluid to keep your urine clear or pale yellow.  · Wash your hands often. If soap and water are not available, use hand .  · Make sure that all people in your household wash their hands well and often.  · Take over-the-counter and prescription medicines only as told by your health care provider.  · Rest at home while you recover.  · Watch your condition for any changes.  · Take a warm bath to relieve any burning or pain from frequent diarrhea episodes.  · Keep all follow-up visits as told by your health care provider. This is important.  Contact a health care provider if:  · You have a fever.  · Your diarrhea gets worse.  · You have new symptoms.  · You cannot keep fluids down.  · You feel light-headed or dizzy.  · You have a headache  · You have muscle cramps.  Get help right away if:  · You have chest  pain.  · You feel extremely weak or you faint.  · You have bloody or black stools or stools that look like tar.  · You have severe pain, cramping, or bloating in your abdomen.  · You have trouble breathing or you are breathing very quickly.  · Your heart is beating very quickly.  · Your skin feels cold and clammy.  · You feel confused.  · You have signs of dehydration, such as:  ? Dark urine, very little urine, or no urine.  ? Cracked lips.  ? Dry mouth.  ? Sunken eyes.  ? Sleepiness.  ? Weakness.  This information is not intended to replace advice given to you by your health care provider. Make sure you discuss any questions you have with your health care provider.  Document Released: 12/08/2003 Document Revised: 08/01/2018 Document Reviewed: 08/23/2016  [employer] Patient Education © 2019 [employer]

## 2019-08-15 ENCOUNTER — LAB (OUTPATIENT)
Dept: LAB | Facility: OTHER | Age: 64
End: 2019-08-15

## 2019-08-15 DIAGNOSIS — E11.9 TYPE 2 DIABETES MELLITUS WITHOUT COMPLICATION, WITHOUT LONG-TERM CURRENT USE OF INSULIN (HCC): ICD-10-CM

## 2019-08-15 DIAGNOSIS — E78.2 MIXED HYPERLIPIDEMIA: Chronic | ICD-10-CM

## 2019-08-15 DIAGNOSIS — E55.9 VITAMIN D DEFICIENCY: Chronic | ICD-10-CM

## 2019-08-15 DIAGNOSIS — D51.3 OTHER DIETARY VITAMIN B12 DEFICIENCY ANEMIA: Chronic | ICD-10-CM

## 2019-08-15 LAB
25(OH)D3 SERPL-MCNC: 38.1 NG/ML (ref 30–100)
ALBUMIN SERPL-MCNC: 4.1 G/DL (ref 3.5–5)
ALBUMIN/GLOB SERPL: 1.5 G/DL (ref 1.1–1.8)
ALP SERPL-CCNC: 96 U/L (ref 38–126)
ALT SERPL W P-5'-P-CCNC: 16 U/L
ANION GAP SERPL CALCULATED.3IONS-SCNC: 9 MMOL/L (ref 5–15)
ANISOCYTOSIS BLD QL: ABNORMAL
AST SERPL-CCNC: 21 U/L (ref 14–36)
BILIRUB SERPL-MCNC: 2.4 MG/DL (ref 0.2–1.3)
BUN BLD-MCNC: 17 MG/DL (ref 7–23)
BUN/CREAT SERPL: 16.3 (ref 7–25)
CALCIUM SPEC-SCNC: 9.2 MG/DL (ref 8.4–10.2)
CHLORIDE SERPL-SCNC: 107 MMOL/L (ref 101–112)
CHOLEST SERPL-MCNC: 156 MG/DL (ref 150–200)
CO2 SERPL-SCNC: 29 MMOL/L (ref 22–30)
CREAT BLD-MCNC: 1.04 MG/DL (ref 0.52–1.04)
DEPRECATED RDW RBC AUTO: 48.6 FL (ref 37–54)
ERYTHROCYTE [DISTWIDTH] IN BLOOD BY AUTOMATED COUNT: 14.5 % (ref 12.3–15.4)
GFR SERPL CREATININE-BSD FRML MDRD: 53 ML/MIN/1.73 (ref 45–104)
GLOBULIN UR ELPH-MCNC: 2.7 GM/DL (ref 2.3–3.5)
GLUCOSE BLD-MCNC: 97 MG/DL (ref 70–99)
HBA1C MFR BLD: 5.6 % (ref 4.8–5.6)
HCT VFR BLD AUTO: 42.5 % (ref 34–46.6)
HDLC SERPL-MCNC: 37 MG/DL (ref 40–59)
HGB BLD-MCNC: 14 G/DL (ref 12–15.9)
LDLC SERPL CALC-MCNC: 83 MG/DL
LDLC/HDLC SERPL: 2.25 {RATIO} (ref 0–3.22)
LYMPHOCYTES # BLD MANUAL: 8.18 10*3/MM3 (ref 0.7–3.1)
LYMPHOCYTES NFR BLD MANUAL: 69 % (ref 19.6–45.3)
LYMPHOCYTES NFR BLD MANUAL: 8 % (ref 5–12)
MCH RBC QN AUTO: 31.7 PG (ref 26.6–33)
MCHC RBC AUTO-ENTMCNC: 32.9 G/DL (ref 31.5–35.7)
MCV RBC AUTO: 96.2 FL (ref 79–97)
MONOCYTES # BLD AUTO: 0.95 10*3/MM3 (ref 0.1–0.9)
NEUTROPHILS # BLD AUTO: 2.61 10*3/MM3 (ref 1.7–7)
NEUTROPHILS NFR BLD MANUAL: 22 % (ref 42.7–76)
PLATELET # BLD AUTO: 106 10*3/MM3 (ref 140–450)
PMV BLD AUTO: 10 FL (ref 6–12)
POTASSIUM BLD-SCNC: 4.3 MMOL/L (ref 3.4–5)
PROT SERPL-MCNC: 6.8 G/DL (ref 6.3–8.6)
RBC # BLD AUTO: 4.42 10*6/MM3 (ref 3.77–5.28)
SMALL PLATELETS BLD QL SMEAR: ABNORMAL
SODIUM BLD-SCNC: 145 MMOL/L (ref 137–145)
TRIGL SERPL-MCNC: 178 MG/DL
TSH SERPL DL<=0.05 MIU/L-ACNC: 1.73 MIU/ML (ref 0.27–4.2)
VARIANT LYMPHS NFR BLD MANUAL: 1 % (ref 0–5)
VIT B12 BLD-MCNC: 719 PG/ML (ref 211–946)
VLDLC SERPL-MCNC: 35.6 MG/DL
WBC MORPH BLD: NORMAL
WBC NRBC COR # BLD: 11.85 10*3/MM3 (ref 3.4–10.8)

## 2019-08-15 PROCEDURE — 80061 LIPID PANEL: CPT | Performed by: INTERNAL MEDICINE

## 2019-08-15 PROCEDURE — 82306 VITAMIN D 25 HYDROXY: CPT | Performed by: INTERNAL MEDICINE

## 2019-08-15 PROCEDURE — 83036 HEMOGLOBIN GLYCOSYLATED A1C: CPT | Performed by: INTERNAL MEDICINE

## 2019-08-15 PROCEDURE — 85025 COMPLETE CBC W/AUTO DIFF WBC: CPT | Performed by: INTERNAL MEDICINE

## 2019-08-15 PROCEDURE — 82607 VITAMIN B-12: CPT | Performed by: INTERNAL MEDICINE

## 2019-08-15 PROCEDURE — 84443 ASSAY THYROID STIM HORMONE: CPT | Performed by: INTERNAL MEDICINE

## 2019-08-15 PROCEDURE — 36415 COLL VENOUS BLD VENIPUNCTURE: CPT | Performed by: INTERNAL MEDICINE

## 2019-08-15 PROCEDURE — 80053 COMPREHEN METABOLIC PANEL: CPT | Performed by: INTERNAL MEDICINE

## 2019-08-16 LAB
ALBUMIN UR-MCNC: 7.3 MG/DL
CREAT UR-MCNC: 265 MG/DL
MICROALBUMIN/CREAT UR: 27.5 MG/G

## 2019-08-16 PROCEDURE — 82570 ASSAY OF URINE CREATININE: CPT | Performed by: INTERNAL MEDICINE

## 2019-08-16 PROCEDURE — 82043 UR ALBUMIN QUANTITATIVE: CPT | Performed by: INTERNAL MEDICINE

## 2019-08-22 ENCOUNTER — OFFICE VISIT (OUTPATIENT)
Dept: FAMILY MEDICINE CLINIC | Facility: CLINIC | Age: 64
End: 2019-08-22

## 2019-08-22 VITALS
HEART RATE: 70 BPM | SYSTOLIC BLOOD PRESSURE: 132 MMHG | BODY MASS INDEX: 34.31 KG/M2 | OXYGEN SATURATION: 97 % | WEIGHT: 201 LBS | TEMPERATURE: 97.3 F | HEIGHT: 64 IN | DIASTOLIC BLOOD PRESSURE: 82 MMHG

## 2019-08-22 DIAGNOSIS — D51.3 OTHER DIETARY VITAMIN B12 DEFICIENCY ANEMIA: Chronic | ICD-10-CM

## 2019-08-22 DIAGNOSIS — C64.1: Chronic | ICD-10-CM

## 2019-08-22 DIAGNOSIS — I83.93 VARICOSE VEINS OF BOTH LOWER EXTREMITIES, UNSPECIFIED WHETHER COMPLICATED: Chronic | ICD-10-CM

## 2019-08-22 DIAGNOSIS — E83.42 HYPOMAGNESEMIA: Chronic | ICD-10-CM

## 2019-08-22 DIAGNOSIS — D50.0 IRON DEFICIENCY ANEMIA DUE TO CHRONIC BLOOD LOSS: Chronic | ICD-10-CM

## 2019-08-22 DIAGNOSIS — I10 ESSENTIAL HYPERTENSION: Chronic | ICD-10-CM

## 2019-08-22 DIAGNOSIS — E78.2 MIXED HYPERLIPIDEMIA: Chronic | ICD-10-CM

## 2019-08-22 DIAGNOSIS — E66.01 CLASS 2 SEVERE OBESITY DUE TO EXCESS CALORIES WITH SERIOUS COMORBIDITY AND BODY MASS INDEX (BMI) OF 35.0 TO 35.9 IN ADULT (HCC): Chronic | ICD-10-CM

## 2019-08-22 DIAGNOSIS — E87.6 HYPOKALEMIA: Chronic | ICD-10-CM

## 2019-08-22 DIAGNOSIS — G47.9 SLEEP DISORDER: Chronic | ICD-10-CM

## 2019-08-22 DIAGNOSIS — E55.9 VITAMIN D DEFICIENCY: Chronic | ICD-10-CM

## 2019-08-22 DIAGNOSIS — F33.42 RECURRENT MAJOR DEPRESSIVE DISORDER, IN FULL REMISSION (HCC): Chronic | ICD-10-CM

## 2019-08-22 DIAGNOSIS — C91.10 CHRONIC LYMPHATIC LEUKEMIA (HCC): Chronic | ICD-10-CM

## 2019-08-22 DIAGNOSIS — E11.9 TYPE 2 DIABETES MELLITUS WITHOUT COMPLICATION, WITHOUT LONG-TERM CURRENT USE OF INSULIN (HCC): Primary | Chronic | ICD-10-CM

## 2019-08-22 DIAGNOSIS — K29.50 CHRONIC GASTRITIS WITHOUT BLEEDING, UNSPECIFIED GASTRITIS TYPE: Chronic | ICD-10-CM

## 2019-08-22 PROCEDURE — 99214 OFFICE O/P EST MOD 30 MIN: CPT | Performed by: INTERNAL MEDICINE

## 2019-08-22 RX ORDER — ATORVASTATIN CALCIUM 40 MG/1
40 TABLET, FILM COATED ORAL DAILY
Qty: 90 TABLET | Refills: 3 | Status: SHIPPED | OUTPATIENT
Start: 2019-08-22 | End: 2020-02-25 | Stop reason: SDUPTHER

## 2019-08-22 RX ORDER — FLUOXETINE 10 MG/1
30 CAPSULE ORAL EVERY MORNING
Qty: 270 CAPSULE | Refills: 3 | Status: SHIPPED | OUTPATIENT
Start: 2019-08-22 | End: 2020-02-25 | Stop reason: SDUPTHER

## 2019-08-22 RX ORDER — ALPRAZOLAM 0.5 MG/1
.5-1 TABLET ORAL NIGHTLY PRN
Qty: 60 TABLET | Refills: 2 | Status: SHIPPED | OUTPATIENT
Start: 2019-08-22 | End: 2020-02-25 | Stop reason: SDUPTHER

## 2019-08-22 RX ORDER — LISINOPRIL 20 MG/1
20 TABLET ORAL DAILY
Qty: 90 TABLET | Refills: 3 | Status: SHIPPED | OUTPATIENT
Start: 2019-08-22 | End: 2020-02-25 | Stop reason: SDUPTHER

## 2019-08-22 RX ORDER — SPIRONOLACTONE 25 MG/1
25 TABLET ORAL 2 TIMES DAILY
Qty: 180 TABLET | Refills: 3 | Status: SHIPPED | OUTPATIENT
Start: 2019-08-22 | End: 2020-02-25 | Stop reason: SDUPTHER

## 2019-08-22 RX ORDER — CARVEDILOL 12.5 MG/1
12.5 TABLET ORAL 2 TIMES DAILY
Qty: 180 TABLET | Refills: 3 | Status: SHIPPED | OUTPATIENT
Start: 2019-08-22 | End: 2020-02-25 | Stop reason: SDUPTHER

## 2019-08-22 NOTE — PROGRESS NOTES
Subjective        History of Present Illness     Chio Mary is a 63 y.o. female who presents for six month follow up on multiple medical issues including diabetes, hypertension, high cholesterol, CLL, vitamin D deficiency, and obesity among other issues. She has history of right nephrectomy by Dr. Franke in 2003 due to RCCA.  She continues to follow with [facility] every three months for history of CLL.  GERD symptoms adequately managed with Prilosec. Diabetes is adequately managed with weekly Ozempic injections.  She struggles with chronic right knee pain addressed with episodic steroid injections by Dr. Rodriguez, which give about 2-3 months of pain relief.     She reports adequate control of anxiety and is sleeping well on her current combination of Xanax and fluoxetine with no tolerability issues.   She denies significant side effects including excessive daytime sedation or sleepwalking.        She is up to date on mammogram.  DEXA 11/2016 revealed normal bone density.  She is up-to-date on colonoscopy.  Colonoscopy 04/2018 revealed diverticulosis in the sigmoid colon as well as one 5 mm polyp in the ascending colon and medium-sized lipoma in the transverse colon.  She will be due repeat colonoscopy 04/2021.      Weight is down 5 pounds in the past six months.  Blood pressure at goal.     The patient's relevant past medical, surgical, and social history was reviewed in Epic.   Lab results are reviewed with the patient today.  CBC consistent with diagnosis of CLL. Fasting glucose 97.  Normal renal and liver function.  Vitamin B-12 at goal.  Vitamin D at goal.   Total cholesterol 156 on atorvastatin. HDL 37.  LDL 83.  Triglycerides 178.  LDL/HDL ratio good at 2.25.  Vitamin B-12 and vitamin D at goal with oral supplements.     Review of Systems   Constitutional: Negative for chills, fatigue and fever.   HENT: Negative for congestion, ear pain, postnasal drip, sinus pressure and sore throat.   "  Respiratory: Negative for cough, shortness of breath and wheezing.    Cardiovascular: Negative for chest pain, palpitations and leg swelling.   Gastrointestinal: Negative for abdominal pain, blood in stool, constipation, diarrhea, nausea and vomiting.   Endocrine: Negative for cold intolerance, heat intolerance, polydipsia and polyuria.   Genitourinary: Negative for dysuria, frequency, hematuria and urgency.   Skin: Negative for rash.   Neurological: Negative for syncope and weakness.        Objective      Vitals:    08/22/19 0918   BP: 132/82   BP Location: Right arm   Patient Position: Sitting   Cuff Size: Adult   Pulse: 70   Temp: 97.3 °F (36.3 °C)   TempSrc: Temporal   SpO2: 97%   Weight: 91.2 kg (201 lb)   Height: 162.6 cm (64\")         Physical Exam   Constitutional: She is oriented to person, place, and time. She appears well-developed and well-nourished. No distress.   Obese female.   HENT:   Head: Normocephalic and atraumatic.   Nose: Right sinus exhibits no maxillary sinus tenderness and no frontal sinus tenderness. Left sinus exhibits no maxillary sinus tenderness and no frontal sinus tenderness.   Mouth/Throat: Uvula is midline, oropharynx is clear and moist and mucous membranes are normal. No oral lesions. No tonsillar exudate.   Eyes: Conjunctivae and EOM are normal. Pupils are equal, round, and reactive to light.   Neck: Trachea normal. Neck supple. No JVD present. Carotid bruit is not present. No tracheal deviation present. No thyroid mass and no thyromegaly present.   Cardiovascular: Normal rate, regular rhythm, normal heart sounds and intact distal pulses.  No extrasystoles are present. PMI is not displaced.   No murmur heard.  Pulmonary/Chest: Effort normal and breath sounds normal. No accessory muscle usage. No respiratory distress. She has no decreased breath sounds. She has no wheezes. She has no rhonchi. She has no rales.   Abdominal: Soft. Bowel sounds are normal. She exhibits no " distension. There is no hepatosplenomegaly. There is no tenderness.   Obese abdomen limits exam.      Vascular Status -  Her right foot exhibits abnormal foot vasculature  (Multiple painless varicosities. ). Her right foot exhibits no edema. Her left foot exhibits abnormal foot vasculature . Her left foot exhibits no edema.  Lymphadenopathy:     She has no cervical adenopathy.   Neurological: She is alert and oriented to person, place, and time. No cranial nerve deficit. Coordination normal.   Skin: Skin is warm, dry and intact. No rash noted. No cyanosis. Nails show no clubbing.   Psychiatric: She has a normal mood and affect. Her speech is normal and behavior is normal. Judgment and thought content normal.   Vitals reviewed.      Assessment/Plan      Continue to follow with [facility]  every three months to address CLL.      Continue Lipitor and dietary efforts.      Continue vitamin D and vitamin B-12 supplements.       Continue Prilosec for GERD.    Continue Xanax and fluoxetine.  A refill is given for Xanax 0.5 mg to take 1-2 tablets by mouth At Night As Needed for Sleep.   Patient understands the risks associated with this controlled medication, including tolerance and addiction.  She also agrees to only obtain this medication from me, and not from a another provider, unless that provider is covering for me in my absence.  She also agrees to be compliant in dosing, and not self adjust the dose of medication.  A signed controlled substance agreement is on file, and she has received a controlled substance education sheet at this a previous visit.  She has also signed a consent for treatment with a controlled substance as per [facility] policy. MURALI was obtained.     Continue with the weekly Ozempic injections.  Intensify diabetic diet, exercise, and weight loss goals.     Continue other medications and vitamin and mineral supplements to treat additional medical problems which we  addressed today. Return in six months for follow up with fasting labs one week prior or sooner if needed.     Scribed for Dr. Quiñonez by Catherine Maldonado [facility].     Diagnoses and all orders for this visit:    Type 2 diabetes mellitus without complication, without long-term current use of insulin (CMS/[facility])  -     CBC Auto Differential; Future  -     Comprehensive Metabolic Panel; Future  -     Hemoglobin A1c; Future    Essential hypertension  -     Comprehensive Metabolic Panel; Future    Mixed hyperlipidemia  -     LDL Cholesterol, Direct; Future    Varicose veins of both lower extremities, unspecified whether complicated    Class 2 severe obesity due to excess calories with serious comorbidity and body mass index (BMI) of 35.0 to 35.9 in adult (CMS/[facility])    Chronic gastritis without bleeding, unspecified gastritis type    Primary malignant neoplasm of right kidney - R nephrectomy 2003    Chronic lymphatic leukemia (CMS/[facility])    Other dietary vitamin B12 deficiency anemia  -     Vitamin B12; Future    Iron deficiency anemia secondary to blood loss (chronic)  -     Ferritin; Future    Recurrent major depressive disorder, in full remission (CMS/[facility])    Sleep disorder    Hypokalemia    Hypomagnesemia  -     Magnesium; Future    Vitamin D deficiency  -     Vitamin D 25 Hydroxy; Future    Other orders  -     spironolactone (ALDACTONE) 25 MG tablet; Take 1 tablet by mouth 2 (Two) Times a Day.  -     atorvastatin (LIPITOR) 40 MG tablet; Take 1 tablet by mouth Daily.  -     FLUoxetine (PROzac) 10 MG capsule; Take 3 capsules by mouth Every Morning.  -     carvedilol (COREG) 12.5 MG tablet; Take 1 tablet by mouth 2 (Two) Times a Day.  -     lisinopril (PRINIVIL,ZESTRIL) 20 MG tablet; Take 1 tablet by mouth Daily. For blood pressure  -     Semaglutide (OZEMPIC) 0.25 or 0.5 MG/DOSE solution pen-injector; Inject 0.5 mg under the skin into the appropriate area as directed 1 (One) Time Per Week.  -     ALPRAZolam (XANAX) 0.5 MG  tablet; Take 1-2 tablets by mouth At Night As Needed for Sleep.        Lab on 08/15/2019   Component Date Value Ref Range Status   • Microalbumin/Creatinine Ratio 08/16/2019 27.5  mg/g Final   • Creatinine, Urine 08/16/2019 265.0  mg/dL Final   • Microalbumin, Urine 08/16/2019 7.3  mg/dL Final   • WBC 08/15/2019 11.85* 3.40 - 10.80 10*3/mm3 Final   • RBC 08/15/2019 4.42  3.77 - 5.28 10*6/mm3 Final   • Hemoglobin 08/15/2019 14.0  12.0 - 15.9 g/dL Final   • Hematocrit 08/15/2019 42.5  34.0 - 46.6 % Final   • MCV 08/15/2019 96.2  79.0 - 97.0 fL Final   • MCH 08/15/2019 31.7  26.6 - 33.0 pg Final   • MCHC 08/15/2019 32.9  31.5 - 35.7 g/dL Final   • RDW 08/15/2019 14.5  12.3 - 15.4 % Final   • RDW-SD 08/15/2019 48.6  37.0 - 54.0 fl Final   • MPV 08/15/2019 10.0  6.0 - 12.0 fL Final   • Platelets 08/15/2019 106* 140 - 450 10*3/mm3 Final   • Glucose 08/15/2019 97  70 - 99 mg/dL Final   • BUN 08/15/2019 17  7 - 23 mg/dL Final   • Creatinine 08/15/2019 1.04  0.52 - 1.04 mg/dL Final   • Sodium 08/15/2019 145  137 - 145 mmol/L Final   • Potassium 08/15/2019 4.3  3.4 - 5.0 mmol/L Final   • Chloride 08/15/2019 107  101 - 112 mmol/L Final   • CO2 08/15/2019 29.0  22.0 - 30.0 mmol/L Final   • Calcium 08/15/2019 9.2  8.4 - 10.2 mg/dL Final   • Total Protein 08/15/2019 6.8  6.3 - 8.6 g/dL Final   • Albumin 08/15/2019 4.10  3.50 - 5.00 g/dL Final   • ALT (SGPT) 08/15/2019 16  <=35 U/L Final   • AST (SGOT) 08/15/2019 21  14 - 36 U/L Final   • Alkaline Phosphatase 08/15/2019 96  38 - 126 U/L Final   • Total Bilirubin 08/15/2019 2.4* 0.2 - 1.3 mg/dL Final   • eGFR Non African Amer 08/15/2019 53  45 - 104 mL/min/1.73 Final   • Globulin 08/15/2019 2.7  2.3 - 3.5 gm/dL Final   • A/G Ratio 08/15/2019 1.5  1.1 - 1.8 g/dL Final   • BUN/Creatinine Ratio 08/15/2019 16.3  7.0 - 25.0 Final   • Anion Gap 08/15/2019 9.0  5.0 - 15.0 mmol/L Final   • Hemoglobin A1C 08/15/2019 5.60  4.80 - 5.60 % Final   • Total Cholesterol 08/15/2019 156  150 - 200  mg/dL Final   • Triglycerides 08/15/2019 178* <=150 mg/dL Final   • HDL Cholesterol 08/15/2019 37* 40 - 59 mg/dL Final   • LDL Cholesterol  08/15/2019 83  <=100 mg/dL Final   • VLDL Cholesterol 08/15/2019 35.6  mg/dL Final   • LDL/HDL Ratio 08/15/2019 2.25  0.00 - 3.22 Final   • Vitamin B-12 08/15/2019 719  211 - 946 pg/mL Final   • 25 Hydroxy, Vitamin D 08/15/2019 38.1  30.0 - 100.0 ng/ml Final   • TSH 08/15/2019 1.730  0.270 - 4.200 mIU/mL Final   • Neutrophil % 08/15/2019 22.0* 42.7 - 76.0 % Final   • Lymphocyte % 08/15/2019 69.0* 19.6 - 45.3 % Final   • Monocyte % 08/15/2019 8.0  5.0 - 12.0 % Final   • Atypical Lymphocyte % 08/15/2019 1.0  0.0 - 5.0 % Final   • Neutrophils Absolute 08/15/2019 2.61  1.70 - 7.00 10*3/mm3 Final   • Lymphocytes Absolute 08/15/2019 8.18* 0.70 - 3.10 10*3/mm3 Final   • Monocytes Absolute 08/15/2019 0.95* 0.10 - 0.90 10*3/mm3 Final   • Anisocytosis 08/15/2019 Slight/1+  None Seen Final   • WBC Morphology 08/15/2019 Normal  Normal Final   • Platelet Estimate 08/15/2019 Decreased  Normal Final   ]

## 2019-08-22 NOTE — PATIENT INSTRUCTIONS
Calorie Counting for Weight Loss  Calories are units of energy. Your body needs a certain amount of calories from food to keep you going throughout the day. When you eat more calories than your body needs, your body stores the extra calories as fat. When you eat fewer calories than your body needs, your body burns fat to get the energy it needs.  Calorie counting means keeping track of how many calories you eat and drink each day. Calorie counting can be helpful if you need to lose weight. If you make sure to eat fewer calories than your body needs, you should lose weight. Ask your health care provider what a healthy weight is for you.  For calorie counting to work, you will need to eat the right number of calories in a day in order to lose a healthy amount of weight per week. A dietitian can help you determine how many calories you need in a day and will give you suggestions on how to reach your calorie goal.  · A healthy amount of weight to lose per week is usually 1-2 lb (0.5-0.9 kg). This usually means that your daily calorie intake should be reduced by 500-750 calories.  · Eating 1,200 - 1,500 calories per day can help most women lose weight.  · Eating 1,500 - 1,800 calories per day can help most men lose weight.  What is my plan?  My goal is to have __________ calories per day.  If I have this many calories per day, I should lose around __________ pounds per week.  What do I need to know about calorie counting?  In order to meet your daily calorie goal, you will need to:  · Find out how many calories are in each food you would like to eat. Try to do this before you eat.  · Decide how much of the food you plan to eat.  · Write down what you ate and how many calories it had. Doing this is called keeping a food log.  To successfully lose weight, it is important to balance calorie counting with a healthy lifestyle that includes regular activity. Aim for 150 minutes of moderate exercise (such as walking) or 75  minutes of vigorous exercise (such as running) each week.  Where do I find calorie information?    The number of calories in a food can be found on a Nutrition Facts label. If a food does not have a Nutrition Facts label, try to look up the calories online or ask your dietitian for help.  Remember that calories are listed per serving. If you choose to have more than one serving of a food, you will have to multiply the calories per serving by the amount of servings you plan to eat. For example, the label on a package of bread might say that a serving size is 1 slice and that there are 90 calories in a serving. If you eat 1 slice, you will have eaten 90 calories. If you eat 2 slices, you will have eaten 180 calories.  How do I keep a food log?  Immediately after each meal, record the following information in your food log:  · What you ate. Don't forget to include toppings, sauces, and other extras on the food.  · How much you ate. This can be measured in cups, ounces, or number of items.  · How many calories each food and drink had.  · The total number of calories in the meal.  Keep your food log near you, such as in a small notebook in your pocket, or use a mobile eloisa or website. Some programs will calculate calories for you and show you how many calories you have left for the day to meet your goal.  What are some calorie counting tips?    · Use your calories on foods and drinks that will fill you up and not leave you hungry:  ? Some examples of foods that fill you up are nuts and nut butters, vegetables, lean proteins, and high-fiber foods like whole grains. High-fiber foods are foods with more than 5 g fiber per serving.  ? Drinks such as sodas, specialty coffee drinks, alcohol, and juices have a lot of calories, yet do not fill you up.  · Eat nutritious foods and avoid empty calories. Empty calories are calories you get from foods or beverages that do not have many vitamins or protein, such as candy, sweets, and  "soda. It is better to have a nutritious high-calorie food (such as an avocado) than a food with few nutrients (such as a bag of chips).  · Know how many calories are in the foods you eat most often. This will help you calculate calorie counts faster.  · Pay attention to calories in drinks. Low-calorie drinks include water and unsweetened drinks.  · Pay attention to nutrition labels for \"low fat\" or \"fat free\" foods. These foods sometimes have the same amount of calories or more calories than the full fat versions. They also often have added sugar, starch, or salt, to make up for flavor that was removed with the fat.  · Find a way of tracking calories that works for you. Get creative. Try different apps or programs if writing down calories does not work for you.  What are some portion control tips?  · Know how many calories are in a serving. This will help you know how many servings of a certain food you can have.  · Use a measuring cup to measure serving sizes. You could also try weighing out portions on a kitchen scale. With time, you will be able to estimate serving sizes for some foods.  · Take some time to put servings of different foods on your favorite plates, bowls, and cups so you know what a serving looks like.  · Try not to eat straight from a bag or box. Doing this can lead to overeating. Put the amount you would like to eat in a cup or on a plate to make sure you are eating the right portion.  · Use smaller plates, glasses, and bowls to prevent overeating.  · Try not to multitask (for example, watch TV or use your computer) while eating. If it is time to eat, sit down at a table and enjoy your food. This will help you to know when you are full. It will also help you to be aware of what you are eating and how much you are eating.  What are tips for following this plan?  Reading food labels  · Check the calorie count compared to the serving size. The serving size may be smaller than what you are used to " eating.  · Check the source of the calories. Make sure the food you are eating is high in vitamins and protein and low in saturated and trans fats.  Shopping  · Read nutrition labels while you shop. This will help you make healthy decisions before you decide to purchase your food.  · Make a grocery list and stick to it.  Cooking  · Try to cook your favorite foods in a healthier way. For example, try baking instead of frying.  · Use low-fat dairy products.  Meal planning  · Use more fruits and vegetables. Half of your plate should be fruits and vegetables.  · Include lean proteins like poultry and fish.  How do I count calories when eating out?  · Ask for smaller portion sizes.  · Consider sharing an entree and sides instead of getting your own entree.  · If you get your own entree, eat only half. Ask for a box at the beginning of your meal and put the rest of your entree in it so you are not tempted to eat it.  · If calories are listed on the menu, choose the lower calorie options.  · Choose dishes that include vegetables, fruits, whole grains, low-fat dairy products, and lean protein.  · Choose items that are boiled, broiled, grilled, or steamed. Stay away from items that are buttered, battered, fried, or served with cream sauce. Items labeled “crispy” are usually fried, unless stated otherwise.  · Choose water, low-fat milk, unsweetened iced tea, or other drinks without added sugar. If you want an alcoholic beverage, choose a lower calorie option such as a glass of wine or light beer.  · Ask for dressings, sauces, and syrups on the side. These are usually high in calories, so you should limit the amount you eat.  · If you want a salad, choose a garden salad and ask for grilled meats. Avoid extra toppings like rodriguez, cheese, or fried items. Ask for the dressing on the side, or ask for olive oil and vinegar or lemon to use as dressing.  · Estimate how many servings of a food you are given. For example, a serving of  cooked rice is ½ cup or about the size of half a baseball. Knowing serving sizes will help you be aware of how much food you are eating at restaurants. The list below tells you how big or small some common portion sizes are based on everyday objects:  ? 1 oz--4 stacked dice.  ? 3 oz--1 deck of cards.  ? 1 tsp--1 die.  ? 1 Tbsp--½ a ping-pong ball.  ? 2 Tbsp--1 ping-pong ball.  ? ½ cup--½ baseball.  ? 1 cup--1 baseball.  Summary  · Calorie counting means keeping track of how many calories you eat and drink each day. If you eat fewer calories than your body needs, you should lose weight.  · A healthy amount of weight to lose per week is usually 1-2 lb (0.5-0.9 kg). This usually means reducing your daily calorie intake by 500-750 calories.  · The number of calories in a food can be found on a Nutrition Facts label. If a food does not have a Nutrition Facts label, try to look up the calories online or ask your dietitian for help.  · Use your calories on foods and drinks that will fill you up, and not on foods and drinks that will leave you hungry.  · Use smaller plates, glasses, and bowls to prevent overeating.  This information is not intended to replace advice given to you by your health care provider. Make sure you discuss any questions you have with your health care provider.  Document Released: 12/18/2006 Document Revised: 11/17/2017 Document Reviewed: 11/17/2017  [employer] Patient Education © 2019 [employer]      Exercising to Lose Weight  Exercise is structured, repetitive physical activity to improve fitness and health. Getting regular exercise is important for everyone. It is especially important if you are overweight. Being overweight increases your risk of heart disease, stroke, diabetes, high blood pressure, and several types of cancer. Reducing your calorie intake and exercising can help you lose weight.  Exercise is usually categorized as moderate or vigorous intensity. To lose weight, most  people need to do a certain amount of moderate-intensity or vigorous-intensity exercise each week.  Moderate-intensity exercise    Moderate-intensity exercise is any activity that gets you moving enough to burn at least three times more energy (calories) than if you were sitting.  Examples of moderate exercise include:  · Walking a mile in 15 minutes.  · Doing light yard work.  · Biking at an easy pace.  Most people should get at least 150 minutes (2 hours and 30 minutes) a week of moderate-intensity exercise to maintain their body weight.  Vigorous-intensity exercise  Vigorous-intensity exercise is any activity that gets you moving enough to burn at least six times more calories than if you were sitting. When you exercise at this intensity, you should be working hard enough that you are not able to carry on a conversation.  Examples of vigorous exercise include:  · Running.  · Playing a team sport, such as football, basketball, and soccer.  · Jumping rope.  Most people should get at least 75 minutes (1 hour and 15 minutes) a week of vigorous-intensity exercise to maintain their body weight.  How can exercise affect me?  When you exercise enough to burn more calories than you eat, you lose weight. Exercise also reduces body fat and builds muscle. The more muscle you have, the more calories you burn. Exercise also:  · Improves mood.  · Reduces stress and tension.  · Improves your overall fitness, flexibility, and endurance.  · Increases bone strength.  The amount of exercise you need to lose weight depends on:  · Your age.  · The type of exercise.  · Any health conditions you have.  · Your overall physical ability.  Talk to your health care provider about how much exercise you need and what types of activities are safe for you.  What actions can I take to lose weight?  Nutrition    · Make changes to your diet as told by your health care provider or diet and nutrition specialist (dietitian). This may  include:  ? Eating fewer calories.  ? Eating more protein.  ? Eating less unhealthy fats.  ? Eating a diet that includes fresh fruits and vegetables, whole grains, low-fat dairy products, and lean protein.  ? Avoiding foods with added fat, salt, and sugar.  · Drink plenty of water while you exercise to prevent dehydration or heat stroke.  Activity  · Choose an activity that you enjoy and set realistic goals. Your health care provider can help you make an exercise plan that works for you.  · Exercise at a moderate or vigorous intensity most days of the week.  ? The intensity of exercise may vary from person to person. You can tell how intense a workout is for you by paying attention to your breathing and heartbeat. Most people will notice their breathing and heartbeat get faster with more intense exercise.  · Do resistance training twice each week, such as:  ? Push-ups.  ? Sit-ups.  ? Lifting weights.  ? Using resistance bands.  · Getting short amounts of exercise can be just as helpful as long structured periods of exercise. If you have trouble finding time to exercise, try to include exercise in your daily routine.  ? Get up, stretch, and walk around every 30 minutes throughout the day.  ? Go for a walk during your lunch break.  ? Park your car farther away from your destination.  ? If you take public transportation, get off one stop early and walk the rest of the way.  ? Make phone calls while standing up and walking around.  ? Take the stairs instead of elevators or escalators.  · Wear comfortable clothes and shoes with good support.  · Do not exercise so much that you hurt yourself, feel dizzy, or get very short of breath.  Where to find more information  · [place] Department of Health and Human Services: www.hhs.gov  · Centers for Disease Control and Prevention (CDC): www.cdc.gov  Contact a health care provider:  · Before starting a new exercise program.  · If you have questions or concerns about your  weight.  · If you have a medical problem that keeps you from exercising.  Get help right away if you have any of the following while exercising:  · Injury.  · Dizziness.  · Difficulty breathing or shortness of breath that does not go away when you stop exercising.  · Chest pain.  · Rapid heartbeat.  Summary  · Being overweight increases your risk of heart disease, stroke, diabetes, high blood pressure, and several types of cancer.  · Losing weight happens when you burn more calories than you eat.  · Reducing the amount of calories you eat in addition to getting regular moderate or vigorous exercise each week helps you lose weight.  This information is not intended to replace advice given to you by your health care provider. Make sure you discuss any questions you have with your health care provider.  Document Released: 01/20/2012 Document Revised: 12/31/2018 Document Reviewed: 12/31/2018  [employer] Patient Education © 2019 [employer]

## 2019-12-13 RX ORDER — ALPRAZOLAM 0.5 MG/1
TABLET ORAL
Qty: 60 TABLET | Refills: 2 | OUTPATIENT
Start: 2019-12-13

## 2020-01-01 ENCOUNTER — LAB (OUTPATIENT)
Dept: LAB | Facility: OTHER | Age: 65
End: 2020-01-01

## 2020-01-01 ENCOUNTER — OFFICE VISIT (OUTPATIENT)
Dept: FAMILY MEDICINE CLINIC | Facility: CLINIC | Age: 65
End: 2020-01-01

## 2020-01-01 ENCOUNTER — TRANSCRIBE ORDERS (OUTPATIENT)
Dept: GENERAL RADIOLOGY | Facility: CLINIC | Age: 65
End: 2020-01-01

## 2020-01-01 ENCOUNTER — PROCEDURE VISIT (OUTPATIENT)
Dept: FAMILY MEDICINE CLINIC | Facility: CLINIC | Age: 65
End: 2020-01-01

## 2020-01-01 VITALS — WEIGHT: 178 LBS | BODY MASS INDEX: 30.39 KG/M2 | HEIGHT: 64 IN

## 2020-01-01 VITALS
WEIGHT: 178 LBS | BODY MASS INDEX: 30.39 KG/M2 | DIASTOLIC BLOOD PRESSURE: 80 MMHG | TEMPERATURE: 97 F | HEIGHT: 64 IN | HEART RATE: 64 BPM | SYSTOLIC BLOOD PRESSURE: 146 MMHG

## 2020-01-01 DIAGNOSIS — D69.6 THROMBOCYTOPENIA (HCC): ICD-10-CM

## 2020-01-01 DIAGNOSIS — I10 ESSENTIAL HYPERTENSION: Chronic | ICD-10-CM

## 2020-01-01 DIAGNOSIS — E78.2 MIXED HYPERLIPIDEMIA: Chronic | ICD-10-CM

## 2020-01-01 DIAGNOSIS — E55.9 VITAMIN D DEFICIENCY: Chronic | ICD-10-CM

## 2020-01-01 DIAGNOSIS — E83.42 HYPOMAGNESEMIA: ICD-10-CM

## 2020-01-01 DIAGNOSIS — C91.10 CHRONIC LYMPHATIC LEUKEMIA (HCC): Chronic | ICD-10-CM

## 2020-01-01 DIAGNOSIS — E11.9 TYPE 2 DIABETES MELLITUS WITHOUT COMPLICATION, WITHOUT LONG-TERM CURRENT USE OF INSULIN (HCC): ICD-10-CM

## 2020-01-01 DIAGNOSIS — C91.10 CLL (CHRONIC LYMPHOCYTIC LEUKEMIA) (HCC): Primary | ICD-10-CM

## 2020-01-01 DIAGNOSIS — E66.09 CLASS 1 OBESITY DUE TO EXCESS CALORIES WITH SERIOUS COMORBIDITY AND BODY MASS INDEX (BMI) OF 30.0 TO 30.9 IN ADULT: Chronic | ICD-10-CM

## 2020-01-01 DIAGNOSIS — C91.10 CLL (CHRONIC LYMPHOCYTIC LEUKEMIA) (HCC): ICD-10-CM

## 2020-01-01 DIAGNOSIS — Z12.31 ENCOUNTER FOR SCREENING MAMMOGRAM FOR MALIGNANT NEOPLASM OF BREAST: Primary | ICD-10-CM

## 2020-01-01 DIAGNOSIS — D50.0 IRON DEFICIENCY ANEMIA DUE TO CHRONIC BLOOD LOSS: Chronic | ICD-10-CM

## 2020-01-01 DIAGNOSIS — F41.1 GENERALIZED ANXIETY DISORDER: ICD-10-CM

## 2020-01-01 DIAGNOSIS — D51.3 OTHER DIETARY VITAMIN B12 DEFICIENCY ANEMIA: Chronic | ICD-10-CM

## 2020-01-01 DIAGNOSIS — F33.42 RECURRENT MAJOR DEPRESSIVE DISORDER, IN FULL REMISSION (HCC): Chronic | ICD-10-CM

## 2020-01-01 DIAGNOSIS — M17.11 PRIMARY OSTEOARTHRITIS OF RIGHT KNEE: Primary | Chronic | ICD-10-CM

## 2020-01-01 DIAGNOSIS — E87.6 HYPOKALEMIA: Chronic | ICD-10-CM

## 2020-01-01 DIAGNOSIS — M17.0 PRIMARY OSTEOARTHRITIS OF BOTH KNEES: Chronic | ICD-10-CM

## 2020-01-01 DIAGNOSIS — E11.9 TYPE 2 DIABETES MELLITUS WITHOUT COMPLICATION, WITHOUT LONG-TERM CURRENT USE OF INSULIN (HCC): Primary | Chronic | ICD-10-CM

## 2020-01-01 DIAGNOSIS — G47.9 SLEEP DISORDER: Chronic | ICD-10-CM

## 2020-01-01 DIAGNOSIS — E11.9 TYPE 2 DIABETES MELLITUS WITHOUT COMPLICATION, WITHOUT LONG-TERM CURRENT USE OF INSULIN (HCC): Chronic | ICD-10-CM

## 2020-01-01 LAB
25(OH)D3 SERPL-MCNC: 50 NG/ML (ref 30–100)
ALBUMIN SERPL-MCNC: 4 G/DL (ref 2.9–4.4)
ALBUMIN SERPL-MCNC: 4 G/DL (ref 3.5–5)
ALBUMIN SERPL-MCNC: 4.3 G/DL (ref 3.5–5)
ALBUMIN UR-MCNC: 7.9 MG/DL
ALBUMIN/GLOB SERPL: 1.5 G/DL (ref 1.1–1.8)
ALBUMIN/GLOB SERPL: 1.5 G/DL (ref 1.1–1.8)
ALBUMIN/GLOB SERPL: 1.5 {RATIO} (ref 0.7–1.7)
ALP SERPL-CCNC: 100 U/L (ref 38–126)
ALP SERPL-CCNC: 85 U/L (ref 38–126)
ALPHA1 GLOB FLD ELPH-MCNC: 0.2 G/DL (ref 0–0.4)
ALPHA2 GLOB SERPL ELPH-MCNC: 0.9 G/DL (ref 0.4–1)
ALT SERPL W P-5'-P-CCNC: 13 U/L
ALT SERPL W P-5'-P-CCNC: 15 U/L
ANION GAP SERPL CALCULATED.3IONS-SCNC: 4 MMOL/L (ref 5–15)
ANION GAP SERPL CALCULATED.3IONS-SCNC: 7 MMOL/L (ref 5–15)
ANISOCYTOSIS BLD QL: ABNORMAL
ANISOCYTOSIS BLD QL: ABNORMAL
AST SERPL-CCNC: 24 U/L (ref 14–36)
AST SERPL-CCNC: 24 U/L (ref 14–36)
B-GLOBULIN SERPL ELPH-MCNC: 0.8 G/DL (ref 0.7–1.3)
BASOPHILS # BLD MANUAL: 0.51 10*3/MM3 (ref 0–0.2)
BASOPHILS NFR BLD AUTO: 2 % (ref 0–1.5)
BILIRUB SERPL-MCNC: 1.9 MG/DL (ref 0.2–1.3)
BILIRUB SERPL-MCNC: 2 MG/DL (ref 0.2–1.3)
BUN BLD-MCNC: 18 MG/DL (ref 7–23)
BUN SERPL-MCNC: 18 MG/DL (ref 7–23)
BUN/CREAT SERPL: 18.6 (ref 7–25)
BUN/CREAT SERPL: 18.9 (ref 7–25)
CALCIUM SPEC-SCNC: 8.7 MG/DL (ref 8.4–10.2)
CALCIUM SPEC-SCNC: 9.3 MG/DL (ref 8.4–10.2)
CHLORIDE SERPL-SCNC: 105 MMOL/L (ref 101–112)
CHLORIDE SERPL-SCNC: 106 MMOL/L (ref 101–112)
CHOLEST SERPL-MCNC: 125 MG/DL (ref 150–200)
CO2 SERPL-SCNC: 30 MMOL/L (ref 22–30)
CO2 SERPL-SCNC: 32 MMOL/L (ref 22–30)
CREAT BLD-MCNC: 0.95 MG/DL (ref 0.52–1.04)
CREAT SERPL-MCNC: 0.97 MG/DL (ref 0.52–1.04)
CREAT UR-MCNC: 205 MG/DL
DEPRECATED RDW RBC AUTO: 50.3 FL (ref 37–54)
DEPRECATED RDW RBC AUTO: 50.6 FL (ref 37–54)
EOSINOPHIL # BLD MANUAL: 0.24 10*3/MM3 (ref 0–0.4)
EOSINOPHIL NFR BLD MANUAL: 1 % (ref 0.3–6.2)
ERYTHROCYTE [DISTWIDTH] IN BLOOD BY AUTOMATED COUNT: 14.7 % (ref 12.3–15.4)
ERYTHROCYTE [DISTWIDTH] IN BLOOD BY AUTOMATED COUNT: 15 % (ref 12.3–15.4)
ERYTHROCYTE [SEDIMENTATION RATE] IN BLOOD: 1 MM/HR (ref 0–20)
FERRITIN SERPL-MCNC: 77 NG/ML (ref 13–150)
GAMMA GLOB SERPL ELPH-MCNC: 0.9 G/DL (ref 0.4–1.8)
GFR SERPL CREATININE-BSD FRML MDRD: 57 ML/MIN/1.73 (ref 45–104)
GFR SERPL CREATININE-BSD FRML MDRD: 59 ML/MIN/1.73 (ref 45–104)
GLOBULIN SER CALC-MCNC: 2.7 G/DL (ref 2.2–3.9)
GLOBULIN UR ELPH-MCNC: 2.6 GM/DL (ref 2.3–3.5)
GLOBULIN UR ELPH-MCNC: 2.9 GM/DL (ref 2.3–3.5)
GLUCOSE BLD-MCNC: 102 MG/DL (ref 70–99)
GLUCOSE SERPL-MCNC: 93 MG/DL (ref 70–99)
HBA1C MFR BLD: 5.6 % (ref 4.8–5.6)
HCT VFR BLD AUTO: 37.7 % (ref 34–46.6)
HCT VFR BLD AUTO: 43 % (ref 34–46.6)
HDLC SERPL-MCNC: 27 MG/DL (ref 40–59)
HGB BLD-MCNC: 12.2 G/DL (ref 12–15.9)
HGB BLD-MCNC: 14.2 G/DL (ref 12–15.9)
IGA SERPL-MCNC: 38 MG/DL (ref 87–352)
IGG SERPL-MCNC: 843 MG/DL (ref 586–1602)
IGM SERPL-MCNC: 49 MG/DL (ref 26–217)
INTERPRETATION SERPL IEP-IMP: ABNORMAL
LDH SERPL-CCNC: 201 U/L (ref 135–214)
LDLC SERPL CALC-MCNC: 73 MG/DL
LDLC/HDLC SERPL: 2.71 {RATIO} (ref 0–3.22)
LYMPHOCYTES # BLD MANUAL: 18.05 10*3/MM3 (ref 0.7–3.1)
LYMPHOCYTES # BLD MANUAL: 23.09 10*3/MM3 (ref 0.7–3.1)
LYMPHOCYTES NFR BLD MANUAL: 3 % (ref 5–12)
LYMPHOCYTES NFR BLD MANUAL: 7 % (ref 5–12)
LYMPHOCYTES NFR BLD MANUAL: 75 % (ref 19.6–45.3)
LYMPHOCYTES NFR BLD MANUAL: 90 % (ref 19.6–45.3)
Lab: ABNORMAL
M-SPIKE: ABNORMAL G/DL
MAGNESIUM SERPL-MCNC: 1.7 MG/DL (ref 1.6–2.3)
MCH RBC QN AUTO: 31.1 PG (ref 26.6–33)
MCH RBC QN AUTO: 31.7 PG (ref 26.6–33)
MCHC RBC AUTO-ENTMCNC: 32.4 G/DL (ref 31.5–35.7)
MCHC RBC AUTO-ENTMCNC: 33 G/DL (ref 31.5–35.7)
MCV RBC AUTO: 94.3 FL (ref 79–97)
MCV RBC AUTO: 97.9 FL (ref 79–97)
MICROALBUMIN/CREAT UR: 38.5 MG/G
MONOCYTES # BLD AUTO: 0.77 10*3/MM3 (ref 0.1–0.9)
MONOCYTES # BLD AUTO: 1.68 10*3/MM3 (ref 0.1–0.9)
NEUTROPHILS # BLD AUTO: 1.28 10*3/MM3 (ref 1.7–7)
NEUTROPHILS # BLD AUTO: 4.09 10*3/MM3 (ref 1.7–7)
NEUTROPHILS NFR BLD MANUAL: 17 % (ref 42.7–76)
NEUTROPHILS NFR BLD MANUAL: 5 % (ref 42.7–76)
PLATELET # BLD AUTO: 114 10*3/MM3 (ref 140–450)
PLATELET # BLD AUTO: 80 10*3/MM3 (ref 140–450)
PMV BLD AUTO: 10 FL (ref 6–12)
PMV BLD AUTO: 10.1 FL (ref 6–12)
POTASSIUM BLD-SCNC: 3.8 MMOL/L (ref 3.4–5)
POTASSIUM SERPL-SCNC: 4 MMOL/L (ref 3.4–5)
PROT SERPL-MCNC: 6.6 G/DL (ref 6.3–8.6)
PROT SERPL-MCNC: 6.7 G/DL (ref 6–8.5)
PROT SERPL-MCNC: 7.2 G/DL (ref 6.3–8.6)
RBC # BLD AUTO: 3.85 10*6/MM3 (ref 3.77–5.28)
RBC # BLD AUTO: 4.56 10*6/MM3 (ref 3.77–5.28)
SMALL PLATELETS BLD QL SMEAR: ABNORMAL
SMALL PLATELETS BLD QL SMEAR: ABNORMAL
SMUDGE CELLS BLD QL SMEAR: ABNORMAL
SODIUM BLD-SCNC: 143 MMOL/L (ref 137–145)
SODIUM SERPL-SCNC: 141 MMOL/L (ref 137–145)
TRIGL SERPL-MCNC: 124 MG/DL
TSH SERPL DL<=0.05 MIU/L-ACNC: 1.77 UIU/ML (ref 0.27–4.2)
VIT B12 BLD-MCNC: 709 PG/ML (ref 211–946)
VLDLC SERPL-MCNC: 24.8 MG/DL
WBC # BLD AUTO: 25.66 10*3/MM3 (ref 3.4–10.8)
WBC MORPH BLD: NORMAL
WBC NRBC COR # BLD: 24.07 10*3/MM3 (ref 3.4–10.8)

## 2020-01-01 PROCEDURE — 99214 OFFICE O/P EST MOD 30 MIN: CPT | Performed by: INTERNAL MEDICINE

## 2020-01-01 PROCEDURE — 84443 ASSAY THYROID STIM HORMONE: CPT | Performed by: INTERNAL MEDICINE

## 2020-01-01 PROCEDURE — 85025 COMPLETE CBC W/AUTO DIFF WBC: CPT | Performed by: INTERNAL MEDICINE

## 2020-01-01 PROCEDURE — 84165 PROTEIN E-PHORESIS SERUM: CPT | Performed by: INTERNAL MEDICINE

## 2020-01-01 PROCEDURE — 82784 ASSAY IGA/IGD/IGG/IGM EACH: CPT | Performed by: INTERNAL MEDICINE

## 2020-01-01 PROCEDURE — 82570 ASSAY OF URINE CREATININE: CPT | Performed by: INTERNAL MEDICINE

## 2020-01-01 PROCEDURE — 83735 ASSAY OF MAGNESIUM: CPT | Performed by: INTERNAL MEDICINE

## 2020-01-01 PROCEDURE — 36415 COLL VENOUS BLD VENIPUNCTURE: CPT | Performed by: INTERNAL MEDICINE

## 2020-01-01 PROCEDURE — 80053 COMPREHEN METABOLIC PANEL: CPT | Performed by: INTERNAL MEDICINE

## 2020-01-01 PROCEDURE — 82306 VITAMIN D 25 HYDROXY: CPT | Performed by: INTERNAL MEDICINE

## 2020-01-01 PROCEDURE — 20610 DRAIN/INJ JOINT/BURSA W/O US: CPT | Performed by: INTERNAL MEDICINE

## 2020-01-01 PROCEDURE — 85651 RBC SED RATE NONAUTOMATED: CPT | Performed by: INTERNAL MEDICINE

## 2020-01-01 PROCEDURE — 83036 HEMOGLOBIN GLYCOSYLATED A1C: CPT | Performed by: INTERNAL MEDICINE

## 2020-01-01 PROCEDURE — 82607 VITAMIN B-12: CPT | Performed by: INTERNAL MEDICINE

## 2020-01-01 PROCEDURE — 82728 ASSAY OF FERRITIN: CPT | Performed by: INTERNAL MEDICINE

## 2020-01-01 PROCEDURE — 83615 LACTATE (LD) (LDH) ENZYME: CPT | Performed by: INTERNAL MEDICINE

## 2020-01-01 PROCEDURE — 80061 LIPID PANEL: CPT | Performed by: INTERNAL MEDICINE

## 2020-01-01 PROCEDURE — G2025 DIS SITE TELE SVCS RHC/FQHC: HCPCS | Performed by: INTERNAL MEDICINE

## 2020-01-01 PROCEDURE — 86334 IMMUNOFIX E-PHORESIS SERUM: CPT | Performed by: INTERNAL MEDICINE

## 2020-01-01 PROCEDURE — 82043 UR ALBUMIN QUANTITATIVE: CPT | Performed by: INTERNAL MEDICINE

## 2020-01-01 RX ORDER — ALPRAZOLAM 0.5 MG/1
.5-1 TABLET ORAL NIGHTLY PRN
Qty: 60 TABLET | Refills: 2 | Status: SHIPPED | OUTPATIENT
Start: 2020-01-01 | End: 2021-01-01 | Stop reason: SDUPTHER

## 2020-01-01 RX ORDER — TRIAMCINOLONE ACETONIDE 40 MG/ML
20 INJECTION, SUSPENSION INTRA-ARTICULAR; INTRAMUSCULAR ONCE
Status: COMPLETED | OUTPATIENT
Start: 2020-01-01 | End: 2020-01-01

## 2020-01-01 RX ORDER — METHYLPREDNISOLONE ACETATE 80 MG/ML
40 INJECTION, SUSPENSION INTRA-ARTICULAR; INTRALESIONAL; INTRAMUSCULAR; SOFT TISSUE ONCE
Status: COMPLETED | OUTPATIENT
Start: 2020-01-01 | End: 2020-01-01

## 2020-01-01 RX ORDER — ALPRAZOLAM 0.5 MG/1
.5-1 TABLET ORAL NIGHTLY PRN
Qty: 60 TABLET | Refills: 2 | Status: SHIPPED | OUTPATIENT
Start: 2020-01-01 | End: 2020-01-01 | Stop reason: SDUPTHER

## 2020-01-01 RX ADMIN — TRIAMCINOLONE ACETONIDE 20 MG: 40 INJECTION, SUSPENSION INTRA-ARTICULAR; INTRAMUSCULAR at 10:49

## 2020-01-01 RX ADMIN — METHYLPREDNISOLONE ACETATE 40 MG: 80 INJECTION, SUSPENSION INTRA-ARTICULAR; INTRALESIONAL; INTRAMUSCULAR; SOFT TISSUE at 10:48

## 2020-02-18 ENCOUNTER — LAB (OUTPATIENT)
Dept: LAB | Facility: OTHER | Age: 65
End: 2020-02-18

## 2020-02-18 DIAGNOSIS — D51.3 OTHER DIETARY VITAMIN B12 DEFICIENCY ANEMIA: Chronic | ICD-10-CM

## 2020-02-18 DIAGNOSIS — E55.9 VITAMIN D DEFICIENCY: Chronic | ICD-10-CM

## 2020-02-18 DIAGNOSIS — E11.9 TYPE 2 DIABETES MELLITUS WITHOUT COMPLICATION, WITHOUT LONG-TERM CURRENT USE OF INSULIN (HCC): ICD-10-CM

## 2020-02-18 DIAGNOSIS — E78.2 MIXED HYPERLIPIDEMIA: Chronic | ICD-10-CM

## 2020-02-18 DIAGNOSIS — D50.0 IRON DEFICIENCY ANEMIA DUE TO CHRONIC BLOOD LOSS: Chronic | ICD-10-CM

## 2020-02-18 DIAGNOSIS — E83.42 HYPOMAGNESEMIA: Chronic | ICD-10-CM

## 2020-02-18 DIAGNOSIS — I10 ESSENTIAL HYPERTENSION: Chronic | ICD-10-CM

## 2020-02-18 LAB
ALBUMIN SERPL-MCNC: 4.3 G/DL (ref 3.5–5)
ALBUMIN/GLOB SERPL: 1.4 G/DL (ref 1.1–1.8)
ALP SERPL-CCNC: 90 U/L (ref 38–126)
ALT SERPL W P-5'-P-CCNC: 14 U/L
ANION GAP SERPL CALCULATED.3IONS-SCNC: 6 MMOL/L (ref 5–15)
ANISOCYTOSIS BLD QL: ABNORMAL
ARTICHOKE IGE QN: 78 MG/DL (ref 0–100)
AST SERPL-CCNC: 30 U/L (ref 14–36)
BILIRUB SERPL-MCNC: 1.9 MG/DL (ref 0.2–1.3)
BUN BLD-MCNC: 18 MG/DL (ref 7–23)
BUN/CREAT SERPL: 17.6 (ref 7–25)
CALCIUM SPEC-SCNC: 9.5 MG/DL (ref 8.4–10.2)
CHLORIDE SERPL-SCNC: 104 MMOL/L (ref 101–112)
CO2 SERPL-SCNC: 32 MMOL/L (ref 22–30)
CREAT BLD-MCNC: 1.02 MG/DL (ref 0.52–1.04)
DEPRECATED RDW RBC AUTO: 48.2 FL (ref 37–54)
EOSINOPHIL # BLD MANUAL: 0.17 10*3/MM3 (ref 0–0.4)
EOSINOPHIL NFR BLD MANUAL: 1 % (ref 0.3–6.2)
ERYTHROCYTE [DISTWIDTH] IN BLOOD BY AUTOMATED COUNT: 14.6 % (ref 12.3–15.4)
GFR SERPL CREATININE-BSD FRML MDRD: 54 ML/MIN/1.73 (ref 45–104)
GLOBULIN UR ELPH-MCNC: 3.1 GM/DL (ref 2.3–3.5)
GLUCOSE BLD-MCNC: 100 MG/DL (ref 70–99)
HBA1C MFR BLD: 5.83 % (ref 4.8–5.6)
HCT VFR BLD AUTO: 42.5 % (ref 34–46.6)
HGB BLD-MCNC: 13.7 G/DL (ref 12–15.9)
LYMPHOCYTES # BLD MANUAL: 12.65 10*3/MM3 (ref 0.7–3.1)
LYMPHOCYTES NFR BLD MANUAL: 7 % (ref 5–12)
LYMPHOCYTES NFR BLD MANUAL: 76 % (ref 19.6–45.3)
MAGNESIUM SERPL-MCNC: 1.6 MG/DL (ref 1.6–2.3)
MCH RBC QN AUTO: 30 PG (ref 26.6–33)
MCHC RBC AUTO-ENTMCNC: 32.2 G/DL (ref 31.5–35.7)
MCV RBC AUTO: 93 FL (ref 79–97)
MONOCYTES # BLD AUTO: 1.16 10*3/MM3 (ref 0.1–0.9)
NEUTROPHILS # BLD AUTO: 2.66 10*3/MM3 (ref 1.7–7)
NEUTROPHILS NFR BLD MANUAL: 16 % (ref 42.7–76)
PLATELET # BLD AUTO: 107 10*3/MM3 (ref 140–450)
PMV BLD AUTO: 10.2 FL (ref 6–12)
POTASSIUM BLD-SCNC: 3.6 MMOL/L (ref 3.4–5)
PROT SERPL-MCNC: 7.4 G/DL (ref 6.3–8.6)
RBC # BLD AUTO: 4.57 10*6/MM3 (ref 3.77–5.28)
SMALL PLATELETS BLD QL SMEAR: ABNORMAL
SODIUM BLD-SCNC: 142 MMOL/L (ref 137–145)
WBC MORPH BLD: NORMAL
WBC NRBC COR # BLD: 16.64 10*3/MM3 (ref 3.4–10.8)

## 2020-02-18 PROCEDURE — 82306 VITAMIN D 25 HYDROXY: CPT | Performed by: INTERNAL MEDICINE

## 2020-02-18 PROCEDURE — 83036 HEMOGLOBIN GLYCOSYLATED A1C: CPT | Performed by: INTERNAL MEDICINE

## 2020-02-18 PROCEDURE — 82728 ASSAY OF FERRITIN: CPT | Performed by: INTERNAL MEDICINE

## 2020-02-18 PROCEDURE — 85025 COMPLETE CBC W/AUTO DIFF WBC: CPT | Performed by: INTERNAL MEDICINE

## 2020-02-18 PROCEDURE — 83735 ASSAY OF MAGNESIUM: CPT | Performed by: INTERNAL MEDICINE

## 2020-02-18 PROCEDURE — 36415 COLL VENOUS BLD VENIPUNCTURE: CPT | Performed by: INTERNAL MEDICINE

## 2020-02-18 PROCEDURE — 80053 COMPREHEN METABOLIC PANEL: CPT | Performed by: INTERNAL MEDICINE

## 2020-02-18 PROCEDURE — 82607 VITAMIN B-12: CPT | Performed by: INTERNAL MEDICINE

## 2020-02-18 PROCEDURE — 83721 ASSAY OF BLOOD LIPOPROTEIN: CPT | Performed by: INTERNAL MEDICINE

## 2020-02-19 LAB
25(OH)D3 SERPL-MCNC: 47.9 NG/ML (ref 30–100)
FERRITIN SERPL-MCNC: 79.5 NG/ML (ref 13–150)
VIT B12 BLD-MCNC: 818 PG/ML (ref 211–946)

## 2020-02-25 ENCOUNTER — OFFICE VISIT (OUTPATIENT)
Dept: FAMILY MEDICINE CLINIC | Facility: CLINIC | Age: 65
End: 2020-02-25

## 2020-02-25 VITALS
BODY MASS INDEX: 32.1 KG/M2 | SYSTOLIC BLOOD PRESSURE: 150 MMHG | DIASTOLIC BLOOD PRESSURE: 100 MMHG | HEART RATE: 68 BPM | HEIGHT: 64 IN | WEIGHT: 188 LBS | TEMPERATURE: 98.8 F

## 2020-02-25 DIAGNOSIS — E11.9 TYPE 2 DIABETES MELLITUS WITHOUT COMPLICATION, WITHOUT LONG-TERM CURRENT USE OF INSULIN (HCC): Chronic | ICD-10-CM

## 2020-02-25 DIAGNOSIS — F33.42 RECURRENT MAJOR DEPRESSIVE DISORDER, IN FULL REMISSION (HCC): Chronic | ICD-10-CM

## 2020-02-25 DIAGNOSIS — I10 ESSENTIAL HYPERTENSION: Chronic | ICD-10-CM

## 2020-02-25 DIAGNOSIS — E87.6 HYPOKALEMIA: Chronic | ICD-10-CM

## 2020-02-25 DIAGNOSIS — C64.1: Chronic | ICD-10-CM

## 2020-02-25 DIAGNOSIS — E66.01 CLASS 2 SEVERE OBESITY DUE TO EXCESS CALORIES WITH SERIOUS COMORBIDITY AND BODY MASS INDEX (BMI) OF 35.0 TO 35.9 IN ADULT (HCC): Chronic | ICD-10-CM

## 2020-02-25 DIAGNOSIS — F41.1 GENERALIZED ANXIETY DISORDER: ICD-10-CM

## 2020-02-25 DIAGNOSIS — D69.6 THROMBOCYTOPENIA (HCC): ICD-10-CM

## 2020-02-25 DIAGNOSIS — R52 PAIN AGGRAVATED BY WALKING: ICD-10-CM

## 2020-02-25 DIAGNOSIS — E55.9 VITAMIN D DEFICIENCY: Chronic | ICD-10-CM

## 2020-02-25 DIAGNOSIS — E78.2 MIXED HYPERLIPIDEMIA: Chronic | ICD-10-CM

## 2020-02-25 DIAGNOSIS — C91.10 CHRONIC LYMPHATIC LEUKEMIA (HCC): Chronic | ICD-10-CM

## 2020-02-25 DIAGNOSIS — Z00.00 MEDICARE ANNUAL WELLNESS VISIT, SUBSEQUENT: Primary | ICD-10-CM

## 2020-02-25 DIAGNOSIS — E83.42 HYPOMAGNESEMIA: ICD-10-CM

## 2020-02-25 DIAGNOSIS — D51.3 OTHER DIETARY VITAMIN B12 DEFICIENCY ANEMIA: Chronic | ICD-10-CM

## 2020-02-25 PROCEDURE — 99214 OFFICE O/P EST MOD 30 MIN: CPT | Performed by: INTERNAL MEDICINE

## 2020-02-25 PROCEDURE — G0439 PPPS, SUBSEQ VISIT: HCPCS | Performed by: INTERNAL MEDICINE

## 2020-02-25 RX ORDER — ALPRAZOLAM 0.5 MG/1
.5-1 TABLET ORAL NIGHTLY PRN
Qty: 60 TABLET | Refills: 2 | Status: SHIPPED | OUTPATIENT
Start: 2020-02-25 | End: 2020-01-01 | Stop reason: SDUPTHER

## 2020-02-25 RX ORDER — CARVEDILOL 12.5 MG/1
12.5 TABLET ORAL 2 TIMES DAILY
Qty: 180 TABLET | Refills: 3 | Status: SHIPPED | OUTPATIENT
Start: 2020-02-25

## 2020-02-25 RX ORDER — SPIRONOLACTONE 25 MG/1
25 TABLET ORAL 2 TIMES DAILY
Qty: 180 TABLET | Refills: 3 | Status: SHIPPED | OUTPATIENT
Start: 2020-02-25

## 2020-02-25 RX ORDER — LISINOPRIL 20 MG/1
20 TABLET ORAL DAILY
Qty: 90 TABLET | Refills: 3 | Status: SHIPPED | OUTPATIENT
Start: 2020-02-25

## 2020-02-25 RX ORDER — ATORVASTATIN CALCIUM 40 MG/1
40 TABLET, FILM COATED ORAL DAILY
Qty: 90 TABLET | Refills: 3 | Status: SHIPPED | OUTPATIENT
Start: 2020-02-25

## 2020-02-25 RX ORDER — FLUOXETINE 10 MG/1
30 CAPSULE ORAL EVERY MORNING
Qty: 270 CAPSULE | Refills: 3 | Status: SHIPPED | OUTPATIENT
Start: 2020-02-25

## 2020-02-25 NOTE — PROGRESS NOTES
The ABCs of the Annual Wellness Visit  Subsequent Medicare Wellness Visit    Chief Complaint   Patient presents with   • Follow-up     6 month   • Medicare Wellness-subsequent   • Med Refill     Xanax       Subjective   History of Present Illness:  Chio Mary is a 64 y.o. female who presents for a Subsequent Medicare Wellness Visit.    HEALTH RISK ASSESSMENT    Recent Hospitalizations:  No hospitalization(s) within the last year.    Current Medical Providers:  Patient Care Team:  Michael Quiñonez MD as PCP -   Alex-Brown, ISMAEL Cordova as Gynecologist (Nurse Practitioner)    Smoking Status:  Social History     Tobacco Use   Smoking Status Former Smoker   • Last attempt to quit:    • Years since quittin.1   Smokeless Tobacco Never Used       Alcohol Consumption:  Social History     Substance and Sexual Activity   Alcohol Use No       Depression Screen:   PHQ-2/PHQ-9 Depression Screening 2020   Little interest or pleasure in doing things 0   Feeling down, depressed, or hopeless 0   Trouble falling or staying asleep, or sleeping too much -   Feeling tired or having little energy -   Poor appetite or overeating -   Feeling bad about yourself - or that you are a failure or have let yourself or your family down -   Trouble concentrating on things, such as reading the newspaper or watching television -   Moving or speaking so slowly that other people could have noticed. Or the opposite - being so fidgety or restless that you have been moving around a lot more than usual -   Thoughts that you would be better off dead, or of hurting yourself in some way -   Total Score 0   If you checked off any problems, how difficult have these problems made it for you to do your work, take care of things at home, or get along with other people? -       Fall Risk Screen:  STEADI Fall Risk Assessment has not been completed.    Health Habits and Functional and Cognitive Screening:  Functional & Cognitive  Status 2/25/2020   Do you have difficulty preparing food and eating? No   Do you have difficulty bathing yourself, getting dressed or grooming yourself? No   Do you have difficulty using the toilet? No   Do you have difficulty moving around from place to place? No   Do you have trouble with steps or getting out of a bed or a chair? No   Current Diet Limited Junk Food   Dental Exam Up to date   Eye Exam Up to date   Exercise (times per week) 7 times per week   Current Exercise Activities Include Walking   Do you need help using the phone?  No   Are you deaf or do you have serious difficulty hearing?  No   Do you need help with transportation? No   Do you need help shopping? No   Do you need help preparing meals?  No   Do you need help with housework?  No   Do you need help with laundry? No   Do you need help taking your medications? No   Do you need help managing money? No   Do you ever drive or ride in a car without wearing a seat belt? No   Have you felt unusual stress, anger or loneliness in the last month? No   Who do you live with? Alone   If you need help, do you have trouble finding someone available to you? No   Have you been bothered in the last four weeks by sexual problems? No   Do you have difficulty concentrating, remembering or making decisions? No         Does the patient have evidence of cognitive impairment? No    Asprin use counseling:Taking ASA appropriately as indicated    Age-appropriate Screening Schedule:  Refer to the list below for future screening recommendations based on patient's age, sex and/or medical conditions. Orders for these recommended tests are listed in the plan section. The patient has been provided with a written plan.    Health Maintenance   Topic Date Due   • DIABETIC FOOT EXAM  11/01/2016   • DXA SCAN  11/14/2018   • ZOSTER VACCINE (2 of 2) 11/05/2019   • URINE MICROALBUMIN  08/16/2020   • HEMOGLOBIN A1C  08/18/2020   • DIABETIC EYE EXAM  12/02/2020   • LIPID PANEL   02/18/2021   • COLONOSCOPY  04/06/2021   • MAMMOGRAM  06/21/2021   • TDAP/TD VACCINES (2 - Td) 09/10/2029   • INFLUENZA VACCINE  Completed          The following portions of the patient's history were reviewed and updated as appropriate:   She  has a past medical history of Abnormal liver function, Anxiety, Blood in feces, Chronic lymphatic leukemia (CMS/HCC), Degenerative joint disease involving multiple joints, Depressive disorder, Epigastric pain, Essential hypertension, Gastritis, Generalized anxiety disorder, H/O bone density study (11/11/2014), History of colon polyps, History of transfusion, Hypokalemia, Hypomagnesemia, Iron deficiency anemia secondary to blood loss (chronic), Migraine, Obesity, Osteopenia, Other dietary vitamin B12 deficiency anemia (8/14/2018), Primary malignant neoplasm of kidney (CMS/HCC), Primary malignant neoplasm of right kidney - R nephrectomy 2003, Prolapsed thoracic intervertebral disc, Pure hypercholesterolemia, Sleep disorder, Tobacco use, Tubular adenoma, Type 2 diabetes mellitus (CMS/HCC), Varicose veins of both lower extremities (8/14/2018), Vitamin D deficiency, and Yeast infection.  She does not have any pertinent problems on file.  She  has a past surgical history that includes inguinal hernia repair laparoscopic and umbilical hernia repair (Right, 01/2004); Cholecystectomy; Colonoscopy w/ polypectomy (02/13/2015); Colonoscopy (03/22/2010); Injection of Medication (06/23/2014); Esophagogastroduodenoscopy w/ PEG (02/13/2015); Other surgical history (03/18/2013); Injection of Medication (03/24/2015); Back surgery; Nephrectomy (Right, 2003); Varicose vein surgery (1990); Injection of Medication (06/23/2014); Other surgical history (10/23/2013); Total hip arthroplasty; Joint replacement; Colonoscopy (N/A, 4/6/2018); Total abdominal hysterectomy w/ bilateral salpingoophorectomy (90's); and Ovarian cyst surgery.  Her family history includes Breast cancer in her sister; Cancer in  an other family member; Colon cancer in an other family member; Pancreatic cancer in her mother; Thyroid disease in an other family member.  She  reports that she quit smoking about 17 years ago. She has never used smokeless tobacco. She reports that she does not drink alcohol or use drugs.  Current Outpatient Medications   Medication Sig Dispense Refill   • ALPRAZolam (XANAX) 0.5 MG tablet Take 1-2 tablets by mouth At Night As Needed for Sleep. 60 tablet 2   • aspirin 81 MG EC tablet Take 81 mg by mouth Daily.     • atorvastatin (LIPITOR) 40 MG tablet Take 1 tablet by mouth Daily. 90 tablet 3   • carvedilol (COREG) 12.5 MG tablet Take 1 tablet by mouth 2 (Two) Times a Day. 180 tablet 3   • cholecalciferol (VITAMIN D3) 1000 UNITS tablet Take 1,000 Units by mouth Daily.     • FLUoxetine (PROzac) 10 MG capsule Take 3 capsules by mouth Every Morning. 270 capsule 3   • glucose blood (ONETOUCH VERIO) test strip Check glucose daily  E11.9 100 each 3   • Lancets 30G misc Check one time daily E11.9 For Verio glucometer 100 each 3   • lisinopril (PRINIVIL,ZESTRIL) 20 MG tablet Take 1 tablet by mouth Daily. For blood pressure 90 tablet 3   • loratadine (CLARITIN) 10 MG tablet Take 10 mg by mouth Daily.     • omeprazole (priLOSEC) 40 MG capsule Take 20 mg by mouth Every Morning.     • POTASSIUM CHLORIDE ER PO Take 100 mEq/day by mouth Daily.     • Semaglutide,0.25 or 0.5MG/DOS, (OZEMPIC, 0.25 OR 0.5 MG/DOSE,) 2 MG/1.5ML solution pen-injector Inject 0.5 mg under the skin into the appropriate area as directed 1 (One) Time Per Week. 3 pen 3   • spironolactone (ALDACTONE) 25 MG tablet Take 1 tablet by mouth 2 (Two) Times a Day. 180 tablet 3   • vitamin B-12 (CYANOCOBALAMIN) 500 MCG tablet Take 2,500 mcg by mouth Every Other Day.     • vitamin C (ASCORBIC ACID) 500 MG tablet Take 500 mg by mouth Daily.     • hyoscyamine (ANASPAZ,LEVSIN) 0.125 MG tablet Take 1 tablet by mouth 4 (Four) Times a Day With Meals & at Bedtime. 120 tablet  5     No current facility-administered medications for this visit.      Current Outpatient Medications on File Prior to Visit   Medication Sig   • aspirin 81 MG EC tablet Take 81 mg by mouth Daily.   • cholecalciferol (VITAMIN D3) 1000 UNITS tablet Take 1,000 Units by mouth Daily.   • glucose blood (ONETOUCH VERIO) test strip Check glucose daily  E11.9   • Lancets 30G misc Check one time daily E11.9 For Verio glucometer   • loratadine (CLARITIN) 10 MG tablet Take 10 mg by mouth Daily.   • omeprazole (priLOSEC) 40 MG capsule Take 20 mg by mouth Every Morning.   • POTASSIUM CHLORIDE ER PO Take 100 mEq/day by mouth Daily.   • vitamin B-12 (CYANOCOBALAMIN) 500 MCG tablet Take 2,500 mcg by mouth Every Other Day.   • vitamin C (ASCORBIC ACID) 500 MG tablet Take 500 mg by mouth Daily.   • [DISCONTINUED] ALPRAZolam (XANAX) 0.5 MG tablet Take 1-2 tablets by mouth At Night As Needed for Sleep.   • [DISCONTINUED] atorvastatin (LIPITOR) 40 MG tablet Take 1 tablet by mouth Daily.   • [DISCONTINUED] carvedilol (COREG) 12.5 MG tablet Take 1 tablet by mouth 2 (Two) Times a Day.   • [DISCONTINUED] FLUoxetine (PROzac) 10 MG capsule Take 3 capsules by mouth Every Morning.   • [DISCONTINUED] lisinopril (PRINIVIL,ZESTRIL) 20 MG tablet Take 1 tablet by mouth Daily. For blood pressure   • [DISCONTINUED] Semaglutide (OZEMPIC) 0.25 or 0.5 MG/DOSE solution pen-injector Inject 0.5 mg under the skin into the appropriate area as directed 1 (One) Time Per Week.   • [DISCONTINUED] spironolactone (ALDACTONE) 25 MG tablet Take 1 tablet by mouth 2 (Two) Times a Day.   • hyoscyamine (ANASPAZ,LEVSIN) 0.125 MG tablet Take 1 tablet by mouth 4 (Four) Times a Day With Meals & at Bedtime.     No current facility-administered medications on file prior to visit.      She is allergic to nsaids; tolmetin; jardiance [empagliflozin]; and metformin..    Outpatient Medications Prior to Visit   Medication Sig Dispense Refill   • aspirin 81 MG EC tablet Take 81 mg  by mouth Daily.     • cholecalciferol (VITAMIN D3) 1000 UNITS tablet Take 1,000 Units by mouth Daily.     • glucose blood (ONETOUCH VERIO) test strip Check glucose daily  E11.9 100 each 3   • Lancets 30G misc Check one time daily E11.9 For Verio glucometer 100 each 3   • loratadine (CLARITIN) 10 MG tablet Take 10 mg by mouth Daily.     • omeprazole (priLOSEC) 40 MG capsule Take 20 mg by mouth Every Morning.     • POTASSIUM CHLORIDE ER PO Take 100 mEq/day by mouth Daily.     • vitamin B-12 (CYANOCOBALAMIN) 500 MCG tablet Take 2,500 mcg by mouth Every Other Day.     • vitamin C (ASCORBIC ACID) 500 MG tablet Take 500 mg by mouth Daily.     • ALPRAZolam (XANAX) 0.5 MG tablet Take 1-2 tablets by mouth At Night As Needed for Sleep. 60 tablet 2   • atorvastatin (LIPITOR) 40 MG tablet Take 1 tablet by mouth Daily. 90 tablet 3   • carvedilol (COREG) 12.5 MG tablet Take 1 tablet by mouth 2 (Two) Times a Day. 180 tablet 3   • FLUoxetine (PROzac) 10 MG capsule Take 3 capsules by mouth Every Morning. 270 capsule 3   • lisinopril (PRINIVIL,ZESTRIL) 20 MG tablet Take 1 tablet by mouth Daily. For blood pressure 90 tablet 3   • Semaglutide (OZEMPIC) 0.25 or 0.5 MG/DOSE solution pen-injector Inject 0.5 mg under the skin into the appropriate area as directed 1 (One) Time Per Week. 3 pen 3   • spironolactone (ALDACTONE) 25 MG tablet Take 1 tablet by mouth 2 (Two) Times a Day. 180 tablet 3   • hyoscyamine (ANASPAZ,LEVSIN) 0.125 MG tablet Take 1 tablet by mouth 4 (Four) Times a Day With Meals & at Bedtime. 120 tablet 5     No facility-administered medications prior to visit.        Patient Active Problem List   Diagnosis   • Vitamin D deficiency   • Type 2 diabetes mellitus without complication, without long-term current use of insulin (CMS/HCC)   • Tubular adenoma   • Sleep disorder   • Prolapsed thoracic intervertebral disc   • Primary malignant neoplasm of right kidney - R nephrectomy 2003   • Osteopenia   • Obesity   • Iron  "deficiency anemia secondary to blood loss (chronic)   • Hypomagnesemia   • Hypokalemia   • History of colon polyps   • Generalized anxiety disorder   • Gastritis   • Essential hypertension   • Degenerative joint disease involving multiple joints   • Chronic lymphatic leukemia (CMS/HCC) - Dr. Soriano   • Mixed hyperlipidemia   • Encounter for colonoscopy due to history of adenomatous colonic polyps   • Diverticulosis of large intestine without hemorrhage   • Varicose veins of both lower extremities   • Recurrent major depressive disorder, in full remission (CMS/HCC)   • Other dietary vitamin B12 deficiency anemia   • Thrombocytopenia (CMS/HCC)       Advanced Care Planning:  ACP discussion was held with the patient during this visit. Patient has an advance directive, copy requested.    Review of Systems    Compared to one year ago, the patient feels her physical health is better.  Compared to one year ago, the patient feels her mental health is better.    Reviewed chart for potential of high risk medication in the elderly: yes  Reviewed chart for potential of harmful drug interactions in the elderly:yes    Objective         Vitals:    02/25/20 1038   BP: 150/100  Comment: bilateral   Pulse: 68   Temp: 98.8 °F (37.1 °C)   TempSrc: Oral   Weight: 85.3 kg (188 lb)   Height: 162.6 cm (64\")   PainSc: 0-No pain       Body mass index is 32.27 kg/m².  Discussed the patient's BMI with her. The BMI is above average; BMI management plan is completed.    Physical Exam    Lab Results   Component Value Date    LDL 78 02/18/2020    HGBA1C 5.83 (H) 02/18/2020        Assessment/Plan   Medicare Risks and Personalized Health Plan  CMS Preventative Services Quick Reference  Advance Directive Discussion  Obesity/Overweight     The above risks/problems have been discussed with the patient.  Pertinent information has been shared with the patient in the After Visit Summary.  Follow up plans and orders are seen below in the Assessment/Plan " Section.    Diagnoses and all orders for this visit:    1. Medicare annual wellness visit, subsequent (Primary)    2. Type 2 diabetes mellitus without complication, without long-term current use of insulin (CMS/[facility])    3. Essential hypertension    4. Mixed hyperlipidemia    5. Class 2 severe obesity due to excess calories with serious comorbidity and body mass index (BMI) of 35.0 to 35.9 in adult (CMS/[facility])    6. Vitamin D deficiency    7. Primary malignant neoplasm of right kidney - R nephrectomy 2003    8. Chronic lymphatic leukemia (CMS/[facility])    9. Recurrent major depressive disorder, in full remission (CMS/[facility])    10. Other dietary vitamin B12 deficiency anemia    11. Hypokalemia    12. Thrombocytopenia (CMS/[facility])    13. Generalized anxiety disorder  -     ALPRAZolam (XANAX) 0.5 MG tablet; Take 1-2 tablets by mouth At Night As Needed for Sleep.  Dispense: 60 tablet; Refill: 2    Other orders  -     spironolactone (ALDACTONE) 25 MG tablet; Take 1 tablet by mouth 2 (Two) Times a Day.  Dispense: 180 tablet; Refill: 3  -     atorvastatin (LIPITOR) 40 MG tablet; Take 1 tablet by mouth Daily.  Dispense: 90 tablet; Refill: 3  -     FLUoxetine (PROzac) 10 MG capsule; Take 3 capsules by mouth Every Morning.  Dispense: 270 capsule; Refill: 3  -     carvedilol (COREG) 12.5 MG tablet; Take 1 tablet by mouth 2 (Two) Times a Day.  Dispense: 180 tablet; Refill: 3  -     lisinopril (PRINIVIL,ZESTRIL) 20 MG tablet; Take 1 tablet by mouth Daily. For blood pressure  Dispense: 90 tablet; Refill: 3  -     Semaglutide,0.25 or 0.5MG/DOS, (OZEMPIC, 0.25 OR 0.5 MG/DOSE,) 2 MG/1.5ML solution pen-injector; Inject 0.5 mg under the skin into the appropriate area as directed 1 (One) Time Per Week.  Dispense: 3 pen; Refill: 3      Follow Up:  Return in about 6 months (around 8/25/2020) for Next scheduled follow up.     An After Visit Summary and PPPS were given to the patient.

## 2020-02-25 NOTE — PATIENT INSTRUCTIONS
Medicare Wellness  Personal Prevention Plan of Service     Date of Office Visit:  2020  Encounter Provider:  Michael Quiñonez MD  Place of Service:  [facility]  Patient Name: Chio Mary  :  1955    As part of the Medicare Wellness portion of your visit today, we are providing you with this personalized preventive plan of services (PPPS). This plan is based upon recommendations of the [place] Preventive Services Task Force (USPSTF) and the Advisory Committee on Immunization Practices (ACIP).    This lists the preventive care services that should be considered, and provides dates of when you are due. Items listed as completed are up-to-date and do not require any further intervention.    Health Maintenance   Topic Date Due   • HEPATITIS C SCREENING  2016   • DIABETIC FOOT EXAM  2016   • DXA SCAN  2018   • ZOSTER VACCINE (2 of 2) 2019   • MEDICARE ANNUAL WELLNESS  2020   • URINE MICROALBUMIN  2020   • HEMOGLOBIN A1C  2020   • DIABETIC EYE EXAM  2020   • LIPID PANEL  2021   • COLONOSCOPY  2021   • MAMMOGRAM  2021   • TDAP/TD VACCINES (2 - Td) 09/10/2029   • Pneumococcal Vaccine Once at 65 Years Old  Completed   • INFLUENZA VACCINE  Completed       No orders of the defined types were placed in this encounter.      Return in about 6 months (around 2020) for Next scheduled follow up.          Exercising to Lose Weight  Exercise is structured, repetitive physical activity to improve fitness and health. Getting regular exercise is important for everyone. It is especially important if you are overweight. Being overweight increases your risk of heart disease, stroke, diabetes, high blood pressure, and several types of cancer. Reducing your calorie intake and exercising can help you lose weight.  Exercise is usually categorized as moderate or vigorous intensity. To lose weight, most people  need to do a certain amount of moderate-intensity or vigorous-intensity exercise each week.  Moderate-intensity exercise    Moderate-intensity exercise is any activity that gets you moving enough to burn at least three times more energy (calories) than if you were sitting.  Examples of moderate exercise include:  · Walking a mile in 15 minutes.  · Doing light yard work.  · Biking at an easy pace.  Most people should get at least 150 minutes (2 hours and 30 minutes) a week of moderate-intensity exercise to maintain their body weight.  Vigorous-intensity exercise  Vigorous-intensity exercise is any activity that gets you moving enough to burn at least six times more calories than if you were sitting. When you exercise at this intensity, you should be working hard enough that you are not able to carry on a conversation.  Examples of vigorous exercise include:  · Running.  · Playing a team sport, such as football, basketball, and soccer.  · Jumping rope.  Most people should get at least 75 minutes (1 hour and 15 minutes) a week of vigorous-intensity exercise to maintain their body weight.  How can exercise affect me?  When you exercise enough to burn more calories than you eat, you lose weight. Exercise also reduces body fat and builds muscle. The more muscle you have, the more calories you burn. Exercise also:  · Improves mood.  · Reduces stress and tension.  · Improves your overall fitness, flexibility, and endurance.  · Increases bone strength.  The amount of exercise you need to lose weight depends on:  · Your age.  · The type of exercise.  · Any health conditions you have.  · Your overall physical ability.  Talk to your health care provider about how much exercise you need and what types of activities are safe for you.  What actions can I take to lose weight?  Nutrition    · Make changes to your diet as told by your health care provider or diet and nutrition specialist (dietitian). This may include:  ? Eating fewer  calories.  ? Eating more protein.  ? Eating less unhealthy fats.  ? Eating a diet that includes fresh fruits and vegetables, whole grains, low-fat dairy products, and lean protein.  ? Avoiding foods with added fat, salt, and sugar.  · Drink plenty of water while you exercise to prevent dehydration or heat stroke.  Activity  · Choose an activity that you enjoy and set realistic goals. Your health care provider can help you make an exercise plan that works for you.  · Exercise at a moderate or vigorous intensity most days of the week.  ? The intensity of exercise may vary from person to person. You can tell how intense a workout is for you by paying attention to your breathing and heartbeat. Most people will notice their breathing and heartbeat get faster with more intense exercise.  · Do resistance training twice each week, such as:  ? Push-ups.  ? Sit-ups.  ? Lifting weights.  ? Using resistance bands.  · Getting short amounts of exercise can be just as helpful as long structured periods of exercise. If you have trouble finding time to exercise, try to include exercise in your daily routine.  ? Get up, stretch, and walk around every 30 minutes throughout the day.  ? Go for a walk during your lunch break.  ? Park your car farther away from your destination.  ? If you take public transportation, get off one stop early and walk the rest of the way.  ? Make phone calls while standing up and walking around.  ? Take the stairs instead of elevators or escalators.  · Wear comfortable clothes and shoes with good support.  · Do not exercise so much that you hurt yourself, feel dizzy, or get very short of breath.  Where to find more information  · [place] Department of Health and Human Services: www.hhs.gov  · Centers for Disease Control and Prevention (CDC): www.cdc.gov  Contact a health care provider:  · Before starting a new exercise program.  · If you have questions or concerns about your weight.  · If you have a medical  problem that keeps you from exercising.  Get help right away if you have any of the following while exercising:  · Injury.  · Dizziness.  · Difficulty breathing or shortness of breath that does not go away when you stop exercising.  · Chest pain.  · Rapid heartbeat.  Summary  · Being overweight increases your risk of heart disease, stroke, diabetes, high blood pressure, and several types of cancer.  · Losing weight happens when you burn more calories than you eat.  · Reducing the amount of calories you eat in addition to getting regular moderate or vigorous exercise each week helps you lose weight.  This information is not intended to replace advice given to you by your health care provider. Make sure you discuss any questions you have with your health care provider.  Document Released: 01/20/2012 Document Revised: 12/31/2018 Document Reviewed: 12/31/2018  [employer] Patient Education © 2020 [employer]

## 2020-02-25 NOTE — PROGRESS NOTES
Subjective        History of Present Illness     Chio Mary is a 64 y.o. female who presents for six month follow up on multiple medical issues including diabetes, hypertension, high cholesterol, CLL, vitamin D deficiency, and obesity among other issues. She has history of right nephrectomy by Dr. Franke in 2003 due to RCCA.  She continues to follow with [facility] every three months for history of CLL.  Denies night sweats or unintentional weight loss.  She has had recent intentional weight loss with a combination of diet and exercise.        She reports a new problem of bilateral foot pain aggravated by walking, but she has found that she can tolerate this as long as she limits walking to no more than 1 mile daily and applies Biofreeze to the feet prior to exercise.    Diabetes is adequately managed with weekly Ozempic injections.  Weight is down 13 pounds in the past six months with caloric reduction and has worked up to walking a mile on the treadmill daily, which is a little challenging with her osteoarthritic foot pain, although, she has found applying topical Bio Freeze prior to exercise helps.   Despite weight loss, BP is above goal at 150/100.      DEXA 11/2016 revealed normal bone density.  She is up-to-date on colonoscopy.  Colonoscopy 04/2018 revealed diverticulosis in the sigmoid colon as well as one 5 mm polyp in the ascending colon and medium-sized lipoma in the transverse colon.  She will be due repeat colonoscopy 04/2021.     She reports adequate control of anxiety and is sleeping well on her current combination of Xanax and fluoxetine with no tolerability issues.   She denies significant side effects with the Xanax including excessive daytime sedation or sleepwalking.        She is up to date on pneumonia vaccines.  She had her second dose of Shingrix earlier this month at [facility].  We will request a copy of administered 2nd dose of Shingrix vaccine to be filed with  "patient's electronic record.  She had first dose 09/2019.     The patient's relevant past medical, surgical, and social history was reviewed in Epic.   Lab results are reviewed with the patient today.  CBC reveals leukocytosis consistent with CLL.  Fasting glucose 100.  A1c 5.83.   Normal renal and liver function.  LDL 78.  Magnesium level adequate    Review of Systems   Constitutional: Negative for chills, fatigue and fever.   HENT: Negative for congestion, ear pain, postnasal drip, sinus pressure and sore throat.    Respiratory: Negative for cough, shortness of breath and wheezing.    Cardiovascular: Negative for chest pain, palpitations and leg swelling.   Gastrointestinal: Negative for abdominal pain, blood in stool, constipation, diarrhea, nausea and vomiting.   Endocrine: Negative for cold intolerance, heat intolerance, polydipsia and polyuria.   Genitourinary: Negative for dysuria, frequency, hematuria and urgency.   Skin: Negative for rash.   Neurological: Negative for syncope and weakness.        Objective     Visit Vitals  /100 Comment: bilateral   Pulse 68   Temp 98.8 °F (37.1 °C) (Oral)   Ht 162.6 cm (64\")   Wt 85.3 kg (188 lb)   LMP  (LMP Unknown) Comment: Hyst w/ BSO in 1990   BMI 32.27 kg/m²     Physical Exam   Constitutional: She is oriented to person, place, and time. She appears well-developed and well-nourished. No distress.   Obese female.    HENT:   Head: Normocephalic and atraumatic.   Nose: Right sinus exhibits no maxillary sinus tenderness and no frontal sinus tenderness. Left sinus exhibits no maxillary sinus tenderness and no frontal sinus tenderness.   Mouth/Throat: Uvula is midline, oropharynx is clear and moist and mucous membranes are normal. No oral lesions. No tonsillar exudate.   Eyes: Pupils are equal, round, and reactive to light. Conjunctivae and EOM are normal.   Neck: Trachea normal. Neck supple. No JVD present. Carotid bruit is not present. No tracheal deviation present. " No thyroid mass and no thyromegaly present.   Cardiovascular: Normal rate, regular rhythm, normal heart sounds and intact distal pulses.  No extrasystoles are present. PMI is not displaced.   No murmur heard.  Pulmonary/Chest: Effort normal and breath sounds normal. No accessory muscle usage. No respiratory distress. She has no decreased breath sounds. She has no wheezes. She has no rhonchi. She has no rales.   Abdominal: Soft. Bowel sounds are normal. She exhibits no distension. There is no hepatosplenomegaly. There is no tenderness.   Obese abdomen.     Vascular Status -  Her right foot exhibits normal foot vasculature  and no edema. Her left foot exhibits normal foot vasculature  and no edema.  Lymphadenopathy:     She has no cervical adenopathy.   Neurological: She is alert and oriented to person, place, and time. No cranial nerve deficit. Coordination normal.   Skin: Skin is warm, dry and intact. No rash noted. No cyanosis. Nails show no clubbing.   Psychiatric: She has a normal mood and affect. Her speech is normal and behavior is normal. Judgment and thought content normal.   Vitals reviewed.        Assessment/Plan      Subsequent Medicare Wellness visit completed today.  She is encouraged to bring in a copy of advance directive to be filed.     Continue weekly Ozempic for diabetes.  She is congratulated on her weight loss and encouraged to intensify her diet and continue the current walking for exercise. Weigh weekly to track progress.  Continue walking for exercise.  Consider crosstraining if needed.  For the new problem of bilateral foot pain aggravated by walking for exercise, she may find inserting a cushioned insole in her walking shoe and replacing it every 4 to 6 months will help lessen foot pain.  I recommended she set a goal of 10-pound additional weight loss in the next six months.     Continue current blood pressure medications.  Monitor blood pressure 2-3 times weekly with a goal of keeping  systolic below 140 and diastolic below 90.  Notify us if BP is consistently above goal and we will make dose adjustment.      We will request a copy of administered Shingrix vaccine from [facility] earlier this month  to be filed with patient's electronic record.      Continue to follow with [facility], every three months to address CLL.      Continue Lipitor and dietary efforts.      Continue vitamin D and vitamin B-12 supplements.       Continue Prilosec for GERD.     Continue Xanax and fluoxetine in combination with regular exercise.  A refill is given for Xanax 0.5 mg to take 1-2 tablets by mouth At Night As Needed for Sleep.   Patient understands the risks associated with this controlled medication, including tolerance and addiction.  She also agrees to only obtain this medication from me, and not from a another provider, unless that provider is covering for me in my absence.  She also agrees to be compliant in dosing, and not self adjust the dose of medication.  A signed controlled substance agreement is on file, and she has received a controlled substance education sheet at this a previous visit.  She has also signed a consent for treatment with a controlled substance as per [facility] policy. MURALI was obtained.     Continue other medications and vitamin and mineral supplements to treat additional medical problems which we addressed today. Return in six months for follow up with fasting labs one week prior or sooner if needed.      Scribed for Dr. Quiñonez by Catherine Maldonado [facility].     Diagnoses and all orders for this visit:    Medicare annual wellness visit, subsequent    Type 2 diabetes mellitus without complication, without long-term current use of insulin (CMS/[facility])  -     CBC Auto Differential; Future  -     Comprehensive Metabolic Panel; Future  -     Hemoglobin A1c; Future  -     Microalbumin / Creatinine Urine Ratio - Urine, Clean Catch; Future  -      TSH; Future    Essential hypertension  -     Comprehensive Metabolic Panel; Future    Mixed hyperlipidemia  -     Lipid Panel; Future    Class 2 severe obesity due to excess calories with serious comorbidity and body mass index (BMI) of 35.0 to 35.9 in adult (CMS/HCC)    Vitamin D deficiency  -     Vitamin D 25 Hydroxy; Future    Primary malignant neoplasm of right kidney - R nephrectomy 2003    Chronic lymphatic leukemia (CMS/HCC)  -     Ferritin; Future    Recurrent major depressive disorder, in full remission (CMS/HCC)    Other dietary vitamin B12 deficiency anemia  -     Ferritin; Future  -     Vitamin B12; Future    Hypokalemia  -     Magnesium; Future    Thrombocytopenia (CMS/HCC)  -     Ferritin; Future    Generalized anxiety disorder  -     ALPRAZolam (XANAX) 0.5 MG tablet; Take 1-2 tablets by mouth At Night As Needed for Sleep.    Hypomagnesemia  -     Magnesium; Future    Pain aggravated by walking -bilateral feet      Other orders  -     spironolactone (ALDACTONE) 25 MG tablet; Take 1 tablet by mouth 2 (Two) Times a Day.  -     atorvastatin (LIPITOR) 40 MG tablet; Take 1 tablet by mouth Daily.  -     FLUoxetine (PROzac) 10 MG capsule; Take 3 capsules by mouth Every Morning.  -     carvedilol (COREG) 12.5 MG tablet; Take 1 tablet by mouth 2 (Two) Times a Day.  -     lisinopril (PRINIVIL,ZESTRIL) 20 MG tablet; Take 1 tablet by mouth Daily. For blood pressure  -     Semaglutide,0.25 or 0.5MG/DOS, (OZEMPIC, 0.25 OR 0.5 MG/DOSE,) 2 MG/1.5ML solution pen-injector; Inject 0.5 mg under the skin into the appropriate area as directed 1 (One) Time Per Week.        Lab on 02/18/2020   Component Date Value Ref Range Status   • WBC 02/18/2020 16.64* 3.40 - 10.80 10*3/mm3 Final   • RBC 02/18/2020 4.57  3.77 - 5.28 10*6/mm3 Final   • Hemoglobin 02/18/2020 13.7  12.0 - 15.9 g/dL Final   • Hematocrit 02/18/2020 42.5  34.0 - 46.6 % Final   • MCV 02/18/2020 93.0  79.0 - 97.0 fL Final   • MCH 02/18/2020 30.0  26.6 - 33.0 pg  Final   • MCHC 02/18/2020 32.2  31.5 - 35.7 g/dL Final   • RDW 02/18/2020 14.6  12.3 - 15.4 % Final   • RDW-SD 02/18/2020 48.2  37.0 - 54.0 fl Final   • MPV 02/18/2020 10.2  6.0 - 12.0 fL Final   • Platelets 02/18/2020 107* 140 - 450 10*3/mm3 Final   • Glucose 02/18/2020 100* 70 - 99 mg/dL Final   • BUN 02/18/2020 18  7 - 23 mg/dL Final   • Creatinine 02/18/2020 1.02  0.52 - 1.04 mg/dL Final   • Sodium 02/18/2020 142  137 - 145 mmol/L Final   • Potassium 02/18/2020 3.6  3.4 - 5.0 mmol/L Final   • Chloride 02/18/2020 104  101 - 112 mmol/L Final   • CO2 02/18/2020 32.0* 22.0 - 30.0 mmol/L Final   • Calcium 02/18/2020 9.5  8.4 - 10.2 mg/dL Final   • Total Protein 02/18/2020 7.4  6.3 - 8.6 g/dL Final   • Albumin 02/18/2020 4.30  3.50 - 5.00 g/dL Final   • ALT (SGPT) 02/18/2020 14  <=35 U/L Final   • AST (SGOT) 02/18/2020 30  14 - 36 U/L Final   • Alkaline Phosphatase 02/18/2020 90  38 - 126 U/L Final   • Total Bilirubin 02/18/2020 1.9* 0.2 - 1.3 mg/dL Final   • eGFR Non African Amer 02/18/2020 54  45 - 104 mL/min/1.73 Final   • Globulin 02/18/2020 3.1  2.3 - 3.5 gm/dL Final   • A/G Ratio 02/18/2020 1.4  1.1 - 1.8 g/dL Final   • BUN/Creatinine Ratio 02/18/2020 17.6  7.0 - 25.0 Final   • Anion Gap 02/18/2020 6.0  5.0 - 15.0 mmol/L Final   • Hemoglobin A1C 02/18/2020 5.83* 4.80 - 5.60 % Final   • LDL Cholesterol  02/18/2020 78  0 - 100 mg/dL Final   • Ferritin 02/18/2020 79.50  13.00 - 150.00 ng/mL Final   • Magnesium 02/18/2020 1.6  1.6 - 2.3 mg/dL Final   • Vitamin B-12 02/18/2020 818  211 - 946 pg/mL Final   • 25 Hydroxy, Vitamin D 02/18/2020 47.9  30.0 - 100.0 ng/ml Final   • Neutrophil % 02/18/2020 16.0* 42.7 - 76.0 % Final   • Lymphocyte % 02/18/2020 76.0* 19.6 - 45.3 % Final   • Monocyte % 02/18/2020 7.0  5.0 - 12.0 % Final   • Eosinophil % 02/18/2020 1.0  0.3 - 6.2 % Final   • Neutrophils Absolute 02/18/2020 2.66  1.70 - 7.00 10*3/mm3 Final   • Lymphocytes Absolute 02/18/2020 12.65* 0.70 - 3.10 10*3/mm3 Final   •  Monocytes Absolute 02/18/2020 1.16* 0.10 - 0.90 10*3/mm3 Final   • Eosinophils Absolute 02/18/2020 0.17  0.00 - 0.40 10*3/mm3 Final   • Anisocytosis 02/18/2020 Slight/1+  None Seen Final   • WBC Morphology 02/18/2020 Normal  Normal Final   • Platelet Estimate 02/18/2020 Decreased  Normal Final   ]

## 2020-08-24 NOTE — PROGRESS NOTES
Subjective      You have chosen to receive care through a telephone visit. Do you consent to use a telephone visit for your medical care today? Yes    Total visit time: 22 minutes       History of Present Illness      Chio Mary is a 64 y.o. female who receives care today via telephone visit for six month follow up on multiple medical issues including diabetes, hypertension, high cholesterol, CLL, vitamin D deficiency, and obesity among other issues. She has history of right nephrectomy by Dr. Franke in 2003 due to RCCA.  She continues to follow with [facility] every three months for history of CLL.    Her weight is down 10 pounds in the past 6 months.  The Ozempic is helping.  We praised her results.  She feels better.  She is still trying to lose more weight.  Her daughter is a nurse at a nursing home, but they have not been checking blood pressure lately.  I asked him to check blood pressure this week and notify us if not at goal.  She reports that it has been better when checked lately at Dr. Soriano's office      We will plan on repeating DEXA this winter.  DEXA 11/2016 revealed normal bone density.  She is up-to-date on colonoscopy.  Colonoscopy 04/2018 revealed diverticulosis in the sigmoid colon as well as one 5 mm polyp in the ascending colon and medium-sized lipoma in the transverse colon.  She will be due repeat colonoscopy 04/2021.     She continues on a combination of Xanax and fluoxetine for anxiety and sleep.  She feels that these issues are well controlled.  She denies significant side effects with the Xanax including excessive daytime sedation     She is up to date on pneumonia vaccines.      The patient's relevant past medical, surgical, and social history was reviewed in Epic.   Lab results are reviewed with the patient today.  CBC consistent with CLL.  Fasting glucose 93.  Normal renal and liver function.  Total cholesterol 125.  HDL low at 27.  LDL 73.  " Triglycerides 124. LDL/HDL ratio 2.71.     Review of Systems   Constitutional: Negative for chills, fatigue and fever.   HENT: Negative for congestion, ear pain, postnasal drip, sinus pressure and sore throat.    Respiratory: Negative for cough, shortness of breath and wheezing.    Cardiovascular: Negative for chest pain, palpitations and leg swelling.   Gastrointestinal: Negative for abdominal pain, blood in stool, constipation, diarrhea, nausea and vomiting.   Endocrine: Negative for cold intolerance, heat intolerance, polydipsia and polyuria.   Genitourinary: Negative for dysuria, frequency, hematuria and urgency.   Skin: Negative for rash.   Neurological: Negative for syncope and weakness.        Objective   Vitals:    08/27/20 0844   Weight: 80.7 kg (178 lb)   Height: 162.6 cm (64\")       Physical Exam   Constitutional: She is oriented to person, place, and time.   Pulmonary/Chest: No respiratory distress.   Neurological: She is alert and oriented to person, place, and time.   Psychiatric: She has a normal mood and affect. Her behavior is normal. Judgment and thought content normal.           Assessment/Plan     Continue the current diabetic management and monitoring.  She is doing very well with the weekly Ozempic.  Metformin produced diarrhea.  Continue and intensify efforts at diabetic diet, exercise, and weight loss.    Continue the lisinopril and Coreg.  Monitor blood pressure at home and notify me if not at goal.  Pursue additional sodium restriction and weight loss.    Continue the Lipitor and dietary efforts.    Continue the omeprazole for reflux.  Symptoms are stable/controlled.    Continue the current vitamin and mineral supplements, including the potassium and vitamin B12.  We will continue to monitor her iron deficiency issues.    Continue to follow with her hematologist/oncologist in [place] due to her CLL and thrombocytopenia, both of which are relatively stable.    Continue the low-dose " fluoxetine.  Continue the use of Xanax at night as needed for sleep.  We provided Xanax refills today.  She denies any tolerability issues, such as sleepwalking or excessive sedation. Patient understands the risks associated with this controlled medication, including tolerance and addiction.  she also agrees to only obtain this medication from me, and not from a another provider, unless that provider is covering for me in my absence.  she also agrees to be compliant in dosing, and not self adjust the dose of medication.  A signed controlled substance agreement is on file, and she has received a controlled substance education sheet at this or a previous visit.  she has also signed a consent for treatment with a controlled substance as per [facility] policy. MURALI was obtained.    Return to clinic in 6 months with fasting labs prior.  She will get her flu vaccination this fall.    Diagnoses and all orders for this visit:    Type 2 diabetes mellitus without complication, without long-term current use of insulin (CMS/[facility])  -     CBC Auto Differential; Future  -     Comprehensive Metabolic Panel; Future  -     Hemoglobin A1c; Future    Essential hypertension    Mixed hyperlipidemia  -     LDL Cholesterol, Direct; Future    Vitamin D deficiency  -     Vitamin D 25 Hydroxy; Future    Chronic lymphatic leukemia (CMS/HCC) - Dr. Soriano  -     CBC Auto Differential; Future    Iron deficiency anemia secondary to blood loss (chronic)  -     Ferritin; Future    Other dietary vitamin B12 deficiency anemia  -     Vitamin B12; Future    Thrombocytopenia (CMS/HCC)  -     CBC Auto Differential; Future    Recurrent major depressive disorder, in full remission (CMS/[facility])    Sleep disorder    Generalized anxiety disorder  -     Discontinue: ALPRAZolam (XANAX) 0.5 MG tablet; Take 1-2 tablets by mouth At Night As Needed for Sleep.  -     ALPRAZolam (XANAX) 0.5 MG tablet; Take 1-2 tablets by mouth At Night As Needed for  Sleep.    Hypomagnesemia  -     Magnesium; Future        Lab on 08/21/2020   Component Date Value Ref Range Status   • WBC 08/21/2020 25.66* 3.40 - 10.80 10*3/mm3 Final   • RBC 08/21/2020 3.85  3.77 - 5.28 10*6/mm3 Final   • Hemoglobin 08/21/2020 12.2  12.0 - 15.9 g/dL Final   • Hematocrit 08/21/2020 37.7  34.0 - 46.6 % Final   • MCV 08/21/2020 97.9* 79.0 - 97.0 fL Final   • MCH 08/21/2020 31.7  26.6 - 33.0 pg Final   • MCHC 08/21/2020 32.4  31.5 - 35.7 g/dL Final   • RDW 08/21/2020 14.7  12.3 - 15.4 % Final   • RDW-SD 08/21/2020 50.3  37.0 - 54.0 fl Final   • MPV 08/21/2020 10.1  6.0 - 12.0 fL Final   • Platelets 08/21/2020 80* 140 - 450 10*3/mm3 Final   • Glucose 08/21/2020 93  70 - 99 mg/dL Final   • BUN 08/21/2020 18  7 - 23 mg/dL Final   • Creatinine 08/21/2020 0.97  0.52 - 1.04 mg/dL Final   • Sodium 08/21/2020 141  137 - 145 mmol/L Final   • Potassium 08/21/2020 4.0  3.4 - 5.0 mmol/L Final   • Chloride 08/21/2020 105  101 - 112 mmol/L Final   • CO2 08/21/2020 32.0* 22.0 - 30.0 mmol/L Final   • Calcium 08/21/2020 8.7  8.4 - 10.2 mg/dL Final   • Total Protein 08/21/2020 6.6  6.3 - 8.6 g/dL Final   • Albumin 08/21/2020 4.00  3.50 - 5.00 g/dL Final   • ALT (SGPT) 08/21/2020 13  <=35 U/L Final   • AST (SGOT) 08/21/2020 24  14 - 36 U/L Final   • Alkaline Phosphatase 08/21/2020 100  38 - 126 U/L Final   • Total Bilirubin 08/21/2020 1.9* 0.2 - 1.3 mg/dL Final   • eGFR Non African Amer 08/21/2020 57  45 - 104 mL/min/1.73 Final   • Globulin 08/21/2020 2.6  2.3 - 3.5 gm/dL Final   • A/G Ratio 08/21/2020 1.5  1.1 - 1.8 g/dL Final   • BUN/Creatinine Ratio 08/21/2020 18.6  7.0 - 25.0 Final   • Anion Gap 08/21/2020 4.0* 5.0 - 15.0 mmol/L Final   • Hemoglobin A1C 08/21/2020 5.60  4.80 - 5.60 % Final   • Total Cholesterol 08/21/2020 125* 150 - 200 mg/dL Final   • Triglycerides 08/21/2020 124  <=150 mg/dL Final   • HDL Cholesterol 08/21/2020 27* 40 - 59 mg/dL Final   • LDL Cholesterol  08/21/2020 73  <=100 mg/dL Final   • VLDL  Cholesterol 08/21/2020 24.8  mg/dL Final   • LDL/HDL Ratio 08/21/2020 2.71  0.00 - 3.22 Final   • Ferritin 08/21/2020 77.00  13.00 - 150.00 ng/mL Final   • Microalbumin/Creatinine Ratio 08/21/2020 38.5  mg/g Final   • Creatinine, Urine 08/21/2020 205.0  mg/dL Final   • Microalbumin, Urine 08/21/2020 7.9  mg/dL Final   • TSH 08/21/2020 1.770  0.270 - 4.200 uIU/mL Final   • Vitamin B-12 08/21/2020 709  211 - 946 pg/mL Final   • 25 Hydroxy, Vitamin D 08/21/2020 50.0  30.0 - 100.0 ng/ml Final   • Magnesium 08/21/2020 1.7  1.6 - 2.3 mg/dL Final   • Neutrophil % 08/21/2020 5.0* 42.7 - 76.0 % Final   • Lymphocyte % 08/21/2020 90.0* 19.6 - 45.3 % Final   • Monocyte % 08/21/2020 3.0* 5.0 - 12.0 % Final   • Basophil % 08/21/2020 2.0* 0.0 - 1.5 % Final   • Neutrophils Absolute 08/21/2020 1.28* 1.70 - 7.00 10*3/mm3 Final   • Lymphocytes Absolute 08/21/2020 23.09* 0.70 - 3.10 10*3/mm3 Final   • Monocytes Absolute 08/21/2020 0.77  0.10 - 0.90 10*3/mm3 Final   • Basophils Absolute 08/21/2020 0.51* 0.00 - 0.20 10*3/mm3 Final   • Anisocytosis 08/21/2020 Slight/1+  None Seen Final   • Smudge Cells 08/21/2020 Slight/1+  None Seen Final   • Platelet Estimate 08/21/2020 Decreased  Normal Final   ]

## 2020-08-27 NOTE — PATIENT INSTRUCTIONS
Calorie Counting for Weight Loss  Calories are units of energy. Your body needs a certain amount of calories from food to keep you going throughout the day. When you eat more calories than your body needs, your body stores the extra calories as fat. When you eat fewer calories than your body needs, your body burns fat to get the energy it needs.  Calorie counting means keeping track of how many calories you eat and drink each day. Calorie counting can be helpful if you need to lose weight. If you make sure to eat fewer calories than your body needs, you should lose weight. Ask your health care provider what a healthy weight is for you.  For calorie counting to work, you will need to eat the right number of calories in a day in order to lose a healthy amount of weight per week. A dietitian can help you determine how many calories you need in a day and will give you suggestions on how to reach your calorie goal.  · A healthy amount of weight to lose per week is usually 1-2 lb (0.5-0.9 kg). This usually means that your daily calorie intake should be reduced by 500-750 calories.  · Eating 1,200 - 1,500 calories per day can help most women lose weight.  · Eating 1,500 - 1,800 calories per day can help most men lose weight.  What is my plan?  My goal is to have __________ calories per day.  If I have this many calories per day, I should lose around __________ pounds per week.  What do I need to know about calorie counting?  In order to meet your daily calorie goal, you will need to:  · Find out how many calories are in each food you would like to eat. Try to do this before you eat.  · Decide how much of the food you plan to eat.  · Write down what you ate and how many calories it had. Doing this is called keeping a food log.  To successfully lose weight, it is important to balance calorie counting with a healthy lifestyle that includes regular activity. Aim for 150 minutes of moderate exercise (such as walking) or 75  minutes of vigorous exercise (such as running) each week.  Where do I find calorie information?    The number of calories in a food can be found on a Nutrition Facts label. If a food does not have a Nutrition Facts label, try to look up the calories online or ask your dietitian for help.  Remember that calories are listed per serving. If you choose to have more than one serving of a food, you will have to multiply the calories per serving by the amount of servings you plan to eat. For example, the label on a package of bread might say that a serving size is 1 slice and that there are 90 calories in a serving. If you eat 1 slice, you will have eaten 90 calories. If you eat 2 slices, you will have eaten 180 calories.  How do I keep a food log?  Immediately after each meal, record the following information in your food log:  · What you ate. Don't forget to include toppings, sauces, and other extras on the food.  · How much you ate. This can be measured in cups, ounces, or number of items.  · How many calories each food and drink had.  · The total number of calories in the meal.  Keep your food log near you, such as in a small notebook in your pocket, or use a mobile eloisa or website. Some programs will calculate calories for you and show you how many calories you have left for the day to meet your goal.  What are some calorie counting tips?    · Use your calories on foods and drinks that will fill you up and not leave you hungry:  ? Some examples of foods that fill you up are nuts and nut butters, vegetables, lean proteins, and high-fiber foods like whole grains. High-fiber foods are foods with more than 5 g fiber per serving.  ? Drinks such as sodas, specialty coffee drinks, alcohol, and juices have a lot of calories, yet do not fill you up.  · Eat nutritious foods and avoid empty calories. Empty calories are calories you get from foods or beverages that do not have many vitamins or protein, such as candy, sweets, and  "soda. It is better to have a nutritious high-calorie food (such as an avocado) than a food with few nutrients (such as a bag of chips).  · Know how many calories are in the foods you eat most often. This will help you calculate calorie counts faster.  · Pay attention to calories in drinks. Low-calorie drinks include water and unsweetened drinks.  · Pay attention to nutrition labels for \"low fat\" or \"fat free\" foods. These foods sometimes have the same amount of calories or more calories than the full fat versions. They also often have added sugar, starch, or salt, to make up for flavor that was removed with the fat.  · Find a way of tracking calories that works for you. Get creative. Try different apps or programs if writing down calories does not work for you.  What are some portion control tips?  · Know how many calories are in a serving. This will help you know how many servings of a certain food you can have.  · Use a measuring cup to measure serving sizes. You could also try weighing out portions on a kitchen scale. With time, you will be able to estimate serving sizes for some foods.  · Take some time to put servings of different foods on your favorite plates, bowls, and cups so you know what a serving looks like.  · Try not to eat straight from a bag or box. Doing this can lead to overeating. Put the amount you would like to eat in a cup or on a plate to make sure you are eating the right portion.  · Use smaller plates, glasses, and bowls to prevent overeating.  · Try not to multitask (for example, watch TV or use your computer) while eating. If it is time to eat, sit down at a table and enjoy your food. This will help you to know when you are full. It will also help you to be aware of what you are eating and how much you are eating.  What are tips for following this plan?  Reading food labels  · Check the calorie count compared to the serving size. The serving size may be smaller than what you are used to " "eating.  · Check the source of the calories. Make sure the food you are eating is high in vitamins and protein and low in saturated and trans fats.  Shopping  · Read nutrition labels while you shop. This will help you make healthy decisions before you decide to purchase your food.  · Make a grocery list and stick to it.  Cooking  · Try to cook your favorite foods in a healthier way. For example, try baking instead of frying.  · Use low-fat dairy products.  Meal planning  · Use more fruits and vegetables. Half of your plate should be fruits and vegetables.  · Include lean proteins like poultry and fish.  How do I count calories when eating out?  · Ask for smaller portion sizes.  · Consider sharing an entree and sides instead of getting your own entree.  · If you get your own entree, eat only half. Ask for a box at the beginning of your meal and put the rest of your entree in it so you are not tempted to eat it.  · If calories are listed on the menu, choose the lower calorie options.  · Choose dishes that include vegetables, fruits, whole grains, low-fat dairy products, and lean protein.  · Choose items that are boiled, broiled, grilled, or steamed. Stay away from items that are buttered, battered, fried, or served with cream sauce. Items labeled \"crispy\" are usually fried, unless stated otherwise.  · Choose water, low-fat milk, unsweetened iced tea, or other drinks without added sugar. If you want an alcoholic beverage, choose a lower calorie option such as a glass of wine or light beer.  · Ask for dressings, sauces, and syrups on the side. These are usually high in calories, so you should limit the amount you eat.  · If you want a salad, choose a garden salad and ask for grilled meats. Avoid extra toppings like rodriguez, cheese, or fried items. Ask for the dressing on the side, or ask for olive oil and vinegar or lemon to use as dressing.  · Estimate how many servings of a food you are given. For example, a serving of " cooked rice is ½ cup or about the size of half a baseball. Knowing serving sizes will help you be aware of how much food you are eating at restaurants. The list below tells you how big or small some common portion sizes are based on everyday objects:  ? 1 oz--4 stacked dice.  ? 3 oz--1 deck of cards.  ? 1 tsp--1 die.  ? 1 Tbsp--½ a ping-pong ball.  ? 2 Tbsp--1 ping-pong ball.  ? ½ cup--½ baseball.  ? 1 cup--1 baseball.  Summary  · Calorie counting means keeping track of how many calories you eat and drink each day. If you eat fewer calories than your body needs, you should lose weight.  · A healthy amount of weight to lose per week is usually 1-2 lb (0.5-0.9 kg). This usually means reducing your daily calorie intake by 500-750 calories.  · The number of calories in a food can be found on a Nutrition Facts label. If a food does not have a Nutrition Facts label, try to look up the calories online or ask your dietitian for help.  · Use your calories on foods and drinks that will fill you up, and not on foods and drinks that will leave you hungry.  · Use smaller plates, glasses, and bowls to prevent overeating.  This information is not intended to replace advice given to you by your health care provider. Make sure you discuss any questions you have with your health care provider.  Document Released: 12/18/2006 Document Revised: 09/06/2019 Document Reviewed: 11/17/2017  [employer] Patient Education © 2020 [employer]      Diabetes Mellitus and Exercise  Exercising regularly is important for your overall health, especially when you have diabetes (diabetes mellitus). Exercising is not only about losing weight. It has many other health benefits, such as increasing muscle strength and bone density and reducing body fat and stress. This leads to improved fitness, flexibility, and endurance, all of which result in better overall health.  Exercise has additional benefits for people with diabetes, including:  · Reducing  appetite.  · Helping to lower and control blood glucose.  · Lowering blood pressure.  · Helping to control amounts of fatty substances (lipids) in the blood, such as cholesterol and triglycerides.  · Helping the body to respond better to insulin (improving insulin sensitivity).  · Reducing how much insulin the body needs.  · Decreasing the risk for heart disease by:  ? Lowering cholesterol and triglyceride levels.  ? Increasing the levels of good cholesterol.  ? Lowering blood glucose levels.  What is my activity plan?  Your health care provider or certified diabetes educator can help you make a plan for the type and frequency of exercise (activity plan) that works for you. Make sure that you:  · Do at least 150 minutes of moderate-intensity or vigorous-intensity exercise each week. This could be brisk walking, biking, or water aerobics.  ? Do stretching and strength exercises, such as yoga or weightlifting, at least 2 times a week.  ? Spread out your activity over at least 3 days of the week.  · Get some form of physical activity every day.  ? Do not go more than 2 days in a row without some kind of physical activity.  ? Avoid being inactive for more than 30 minutes at a time. Take frequent breaks to walk or stretch.  · Choose a type of exercise or activity that you enjoy, and set realistic goals.  · Start slowly, and gradually increase the intensity of your exercise over time.  What do I need to know about managing my diabetes?    · Check your blood glucose before and after exercising.  ? If your blood glucose is 240 mg/dL (13.3 mmol/L) or higher before you exercise, check your urine for ketones. If you have ketones in your urine, do not exercise until your blood glucose returns to normal.  ? If your blood glucose is 100 mg/dL (5.6 mmol/L) or lower, eat a snack containing 15-20 grams of carbohydrate. Check your blood glucose 15 minutes after the snack to make sure that your level is above 100 mg/dL (5.6 mmol/L)  before you start your exercise.  · Know the symptoms of low blood glucose (hypoglycemia) and how to treat it. Your risk for hypoglycemia increases during and after exercise. Common symptoms of hypoglycemia can include:  ? Hunger.  ? Anxiety.  ? Sweating and feeling clammy.  ? Confusion.  ? Dizziness or feeling light-headed.  ? Increased heart rate or palpitations.  ? Blurry vision.  ? Tingling or numbness around the mouth, lips, or tongue.  ? Tremors or shakes.  ? Irritability.  · Keep a rapid-acting carbohydrate snack available before, during, and after exercise to help prevent or treat hypoglycemia.  · Avoid injecting insulin into areas of the body that are going to be exercised. For example, avoid injecting insulin into:  ? The arms, when playing tennis.  ? The legs, when jogging.  · Keep records of your exercise habits. Doing this can help you and your health care provider adjust your diabetes management plan as needed. Write down:  ? Food that you eat before and after you exercise.  ? Blood glucose levels before and after you exercise.  ? The type and amount of exercise you have done.  ? When your insulin is expected to peak, if you use insulin. Avoid exercising at times when your insulin is peaking.  · When you start a new exercise or activity, work with your health care provider to make sure the activity is safe for you, and to adjust your insulin, medicines, or food intake as needed.  · Drink plenty of water while you exercise to prevent dehydration or heat stroke. Drink enough fluid to keep your urine clear or pale yellow.  Summary  · Exercising regularly is important for your overall health, especially when you have diabetes (diabetes mellitus).  · Exercising has many health benefits, such as increasing muscle strength and bone density and reducing body fat and stress.  · Your health care provider or certified diabetes educator can help you make a plan for the type and frequency of exercise (activity plan)  that works for you.  · When you start a new exercise or activity, work with your health care provider to make sure the activity is safe for you, and to adjust your insulin, medicines, or food intake as needed.  This information is not intended to replace advice given to you by your health care provider. Make sure you discuss any questions you have with your health care provider.  Document Released: 03/09/2005 Document Revised: 07/12/2018 Document Reviewed: 05/29/2017  [employer] Patient Education © 2020 [employer]

## 2020-10-28 PROBLEM — M17.11 PRIMARY OSTEOARTHRITIS OF RIGHT KNEE: Chronic | Status: ACTIVE | Noted: 2020-01-01

## 2020-10-28 PROBLEM — M17.0 PRIMARY OSTEOARTHRITIS OF BOTH KNEES: Chronic | Status: ACTIVE | Noted: 2020-01-01

## 2020-10-28 NOTE — PATIENT INSTRUCTIONS

## 2020-10-28 NOTE — PROGRESS NOTES
Subjective         History of Present Illness     Chio Mary is a 65 y.o. female with multiple issues to address.  She has extensive osteoarthritis exacerbated by historic obesity with history of bilateral hip replacement as well as right shoulder replacement and additional osteoarthritis/DJD involving multiple joints who struggles with chronic bilateral knee pain and is currently experencing increased constant right medial knee pain, worse for the past 2 weeks.  She reports good results with steroid injection in left knee by Dr. Rodriguez last year, but has never had the right knee injected.  She has not had x-rays of the right knee.  She is having difficulty bending the right knee and has medial right knee pain with flexion on exam today with trace effusion. She has been ambulating with a cane at home to assist with ambulation and reduce fall risk .  She has been taking up to 6 Tylenol Arthritis daily as needed for pain.     She has type 2 diabetes, and we discussed the impact that steroids can have on glucose, but reports glucose did not elevate significantly with the steroid injection last year.  A1c was 5.6 in August.        Blood pressure slightly above goal today at 146/80.  She agrees to monitor blood pressure at home frequently for a while and notify us if above goal.      Weight is down 10 pounds in the past 8 months with patient reporting a total 23-pound weight loss, which is beneficial in decreasing joint pain as well as improving management of her diabetes, hypertension, hyperlipidemia, etc.      Review of Systems   Constitutional: Negative for chills, fatigue and fever.   HENT: Negative for congestion, ear pain, postnasal drip, sinus pressure and sore throat.    Respiratory: Negative for cough, shortness of breath and wheezing.    Cardiovascular: Negative for chest pain, palpitations and leg swelling.   Gastrointestinal: Negative for abdominal pain, blood in stool, constipation, diarrhea, nausea  "and vomiting.   Endocrine: Negative for cold intolerance, heat intolerance, polydipsia and polyuria.   Genitourinary: Negative for dysuria, frequency, hematuria and urgency.   Musculoskeletal: Positive for arthralgias and gait problem.        Knee pain.     Skin: Negative for rash.   Neurological: Negative for syncope and weakness.        Objective     Visit Vitals  /80   Pulse 64   Temp 97 °F (36.1 °C) (Infrared)   Ht 162.6 cm (64\")   Wt 80.7 kg (178 lb)   LMP  (LMP Unknown) Comment: Hyst w/ BSO in 1990   BMI 30.55 kg/m²     Physical Exam  Constitutional:       General: She is not in acute distress.     Appearance: She is well-developed.   HENT:      Head: Normocephalic and atraumatic.      Nose: Nose normal.      Mouth/Throat:      Pharynx: No oropharyngeal exudate.   Eyes:      Pupils: Pupils are equal, round, and reactive to light.   Neck:      Musculoskeletal: Neck supple.      Thyroid: No thyromegaly.      Vascular: No JVD.   Cardiovascular:      Rate and Rhythm: Normal rate and regular rhythm.      Heart sounds: Normal heart sounds.   Pulmonary:      Effort: Pulmonary effort is normal. No accessory muscle usage or respiratory distress.      Breath sounds: Normal breath sounds. No wheezing or rales.   Abdominal:      General: Bowel sounds are normal. There is no distension.      Palpations: Abdomen is soft.      Tenderness: There is no abdominal tenderness.      Comments: Obese abdomen   Musculoskeletal:      Comments: Exam of the right knee reveals medial right knee pain with flexion with medial laxity and trace effusion.   Mild crepitance.  No warmth or erythema of the joint.  No joint instability.   Lymphadenopathy:      Cervical: No cervical adenopathy.   Neurological:      Mental Status: She is alert and oriented to person, place, and time.      Cranial Nerves: No cranial nerve deficit.   Psychiatric:         Speech: Speech normal.         Behavior: Behavior normal.         Thought Content: Thought " content normal.         Judgment: Judgment normal.              Future Appointments   Date Time Provider Department Center   3/2/2021  9:30 AM Michael Quiñonez MD [facility] None       Assessment/Plan    Arthrocentesis    Date/Time: 10/28/2020 10:00 AM  Performed by: Michael Quiñonez MD  Authorized by: Michael Quiñonez MD   Indications: pain and joint swelling   Body area: knee  Joint: right knee  Local anesthesia used: yes    Anesthesia:  Local anesthesia used: yes  Local Anesthetic: lidocaine 1% with epinephrine  Anesthetic total: 1 mL    Sedation:  Patient sedated: no    Preparation: Patient was prepped and draped in the usual sterile fashion.  Needle size: 22 G  Ultrasound guidance: no  Approach: medial  Patient tolerance: patient tolerated the procedure well with no immediate complications  Comments: After obtaining verbal consent, right knee is cleaned and prepped in sterile technique.  Local anesthesia obtained with 1% lidocaine with epinephrine. The right knee is then injected with 2 ml solution of equal parts of lidocaine, Marcaine, Kenalog and Depo-Medrol without difficulty or complications. Band-Aid is placed on the injection site.          After obtaining verbal consent, right knee is cleaned and prepped in sterile technique.  Local anesthesia obtained with 1% lidocaine with epinephrine. The right knee is then injected with 2 ml solution of equal parts of lidocaine, Marcaine, Kenalog and Depo-Medrol without difficulty or complications. Band-Aid is placed on the injection site. She will stop by for x-rays of right knee on her way out today and we will notify patient with results.  Continue the Tylenol Arthritis, for which I recommended 4-5 daily maximum.        She is congratulated on weight loss and encouraged to intensify her diet and weight loss efforts.  Exercise is limited currently due to the chronic knee pain.     Continue BP medication. Monitor BP episodically at rest and notify me if not consistently  at goal.     Monitor glucose closely at home, especially for the next few days due to the steroid used in the joint injection.  Continue current diabetic management and monitoring.    We discussed a goal of 10 additional pounds of weight loss this next year.  She is in agreement.    Return in March 2021 for routine follow up with fasting labs one week prior or sooner if needed.     Scribed for Dr. Quiñonez by Catherine Maldonado [facility].     Diagnoses and all orders for this visit:    Primary osteoarthritis of right knee  -     triamcinolone acetonide (KENALOG-40) injection 20 mg  -     methylPREDNISolone acetate (DEPO-medrol) injection 40 mg  -     XR Knee 3 View Right    Primary osteoarthritis of both knees    Class 1 obesity due to excess calories with serious comorbidity and body mass index (BMI) of 30.0 to 30.9 in adult    Type 2 diabetes mellitus without complication, without long-term current use of insulin (CMS/[facility])    Essential hypertension        No visits with results within 3 Week(s) from this visit.   Latest known visit with results is:   Lab on 08/21/2020   Component Date Value Ref Range Status   • WBC 08/21/2020 25.66* 3.40 - 10.80 10*3/mm3 Final   • RBC 08/21/2020 3.85  3.77 - 5.28 10*6/mm3 Final   • Hemoglobin 08/21/2020 12.2  12.0 - 15.9 g/dL Final   • Hematocrit 08/21/2020 37.7  34.0 - 46.6 % Final   • MCV 08/21/2020 97.9* 79.0 - 97.0 fL Final   • MCH 08/21/2020 31.7  26.6 - 33.0 pg Final   • MCHC 08/21/2020 32.4  31.5 - 35.7 g/dL Final   • RDW 08/21/2020 14.7  12.3 - 15.4 % Final   • RDW-SD 08/21/2020 50.3  37.0 - 54.0 fl Final   • MPV 08/21/2020 10.1  6.0 - 12.0 fL Final   • Platelets 08/21/2020 80* 140 - 450 10*3/mm3 Final   • Glucose 08/21/2020 93  70 - 99 mg/dL Final   • BUN 08/21/2020 18  7 - 23 mg/dL Final   • Creatinine 08/21/2020 0.97  0.52 - 1.04 mg/dL Final   • Sodium 08/21/2020 141  137 - 145 mmol/L Final   • Potassium 08/21/2020 4.0  3.4 - 5.0 mmol/L Final   • Chloride 08/21/2020 105  101 -  112 mmol/L Final   • CO2 08/21/2020 32.0* 22.0 - 30.0 mmol/L Final   • Calcium 08/21/2020 8.7  8.4 - 10.2 mg/dL Final   • Total Protein 08/21/2020 6.6  6.3 - 8.6 g/dL Final   • Albumin 08/21/2020 4.00  3.50 - 5.00 g/dL Final   • ALT (SGPT) 08/21/2020 13  <=35 U/L Final   • AST (SGOT) 08/21/2020 24  14 - 36 U/L Final   • Alkaline Phosphatase 08/21/2020 100  38 - 126 U/L Final   • Total Bilirubin 08/21/2020 1.9* 0.2 - 1.3 mg/dL Final   • eGFR Non African Amer 08/21/2020 57  45 - 104 mL/min/1.73 Final   • Globulin 08/21/2020 2.6  2.3 - 3.5 gm/dL Final   • A/G Ratio 08/21/2020 1.5  1.1 - 1.8 g/dL Final   • BUN/Creatinine Ratio 08/21/2020 18.6  7.0 - 25.0 Final   • Anion Gap 08/21/2020 4.0* 5.0 - 15.0 mmol/L Final   • Hemoglobin A1C 08/21/2020 5.60  4.80 - 5.60 % Final   • Total Cholesterol 08/21/2020 125* 150 - 200 mg/dL Final   • Triglycerides 08/21/2020 124  <=150 mg/dL Final   • HDL Cholesterol 08/21/2020 27* 40 - 59 mg/dL Final   • LDL Cholesterol  08/21/2020 73  <=100 mg/dL Final   • VLDL Cholesterol 08/21/2020 24.8  mg/dL Final   • LDL/HDL Ratio 08/21/2020 2.71  0.00 - 3.22 Final   • Ferritin 08/21/2020 77.00  13.00 - 150.00 ng/mL Final   • Microalbumin/Creatinine Ratio 08/21/2020 38.5  mg/g Final   • Creatinine, Urine 08/21/2020 205.0  mg/dL Final   • Microalbumin, Urine 08/21/2020 7.9  mg/dL Final   • TSH 08/21/2020 1.770  0.270 - 4.200 uIU/mL Final   • Vitamin B-12 08/21/2020 709  211 - 946 pg/mL Final   • 25 Hydroxy, Vitamin D 08/21/2020 50.0  30.0 - 100.0 ng/ml Final   • Magnesium 08/21/2020 1.7  1.6 - 2.3 mg/dL Final   • Neutrophil % 08/21/2020 5.0* 42.7 - 76.0 % Final   • Lymphocyte % 08/21/2020 90.0* 19.6 - 45.3 % Final   • Monocyte % 08/21/2020 3.0* 5.0 - 12.0 % Final   • Basophil % 08/21/2020 2.0* 0.0 - 1.5 % Final   • Neutrophils Absolute 08/21/2020 1.28* 1.70 - 7.00 10*3/mm3 Final   • Lymphocytes Absolute 08/21/2020 23.09* 0.70 - 3.10 10*3/mm3 Final   • Monocytes Absolute 08/21/2020 0.77  0.10 - 0.90  10*3/mm3 Final   • Basophils Absolute 08/21/2020 0.51* 0.00 - 0.20 10*3/mm3 Final   • Anisocytosis 08/21/2020 Slight/1+  None Seen Final   • Smudge Cells 08/21/2020 Slight/1+  None Seen Final   • Platelet Estimate 08/21/2020 Decreased  Normal Final   ]

## 2021-01-01 ENCOUNTER — IMMUNIZATION (OUTPATIENT)
Dept: VACCINE CLINIC | Facility: HOSPITAL | Age: 66
End: 2021-01-01

## 2021-01-01 ENCOUNTER — APPOINTMENT (OUTPATIENT)
Dept: VACCINE CLINIC | Facility: HOSPITAL | Age: 66
End: 2021-01-01

## 2021-01-01 ENCOUNTER — TELEPHONE (OUTPATIENT)
Dept: FAMILY MEDICINE CLINIC | Facility: CLINIC | Age: 66
End: 2021-01-01

## 2021-01-01 ENCOUNTER — OFFICE VISIT (OUTPATIENT)
Dept: FAMILY MEDICINE CLINIC | Facility: CLINIC | Age: 66
End: 2021-01-01

## 2021-01-01 ENCOUNTER — LAB (OUTPATIENT)
Dept: LAB | Facility: OTHER | Age: 66
End: 2021-01-01

## 2021-01-01 VITALS — WEIGHT: 180 LBS | BODY MASS INDEX: 30.73 KG/M2 | HEIGHT: 64 IN

## 2021-01-01 DIAGNOSIS — F41.1 GENERALIZED ANXIETY DISORDER: Chronic | ICD-10-CM

## 2021-01-01 DIAGNOSIS — I10 ESSENTIAL HYPERTENSION: Chronic | ICD-10-CM

## 2021-01-01 DIAGNOSIS — C91.10 CHRONIC LYMPHATIC LEUKEMIA (HCC): ICD-10-CM

## 2021-01-01 DIAGNOSIS — Z00.00 MEDICARE ANNUAL WELLNESS VISIT, SUBSEQUENT: Primary | ICD-10-CM

## 2021-01-01 DIAGNOSIS — Z78.0 MENOPAUSE: ICD-10-CM

## 2021-01-01 DIAGNOSIS — D51.3 OTHER DIETARY VITAMIN B12 DEFICIENCY ANEMIA: Chronic | ICD-10-CM

## 2021-01-01 DIAGNOSIS — I25.118 ATHEROSCLEROSIS OF NATIVE CORONARY ARTERY OF NATIVE HEART WITH STABLE ANGINA PECTORIS (HCC): Chronic | ICD-10-CM

## 2021-01-01 DIAGNOSIS — G47.9 SLEEP DISORDER: Chronic | ICD-10-CM

## 2021-01-01 DIAGNOSIS — E11.9 TYPE 2 DIABETES MELLITUS WITHOUT COMPLICATION, WITHOUT LONG-TERM CURRENT USE OF INSULIN (HCC): Primary | Chronic | ICD-10-CM

## 2021-01-01 DIAGNOSIS — E55.9 VITAMIN D DEFICIENCY: Chronic | ICD-10-CM

## 2021-01-01 DIAGNOSIS — N32.81 OAB (OVERACTIVE BLADDER): Chronic | ICD-10-CM

## 2021-01-01 DIAGNOSIS — R19.7 POSTCHOLECYSTECTOMY DIARRHEA: Chronic | ICD-10-CM

## 2021-01-01 DIAGNOSIS — D50.0 IRON DEFICIENCY ANEMIA DUE TO CHRONIC BLOOD LOSS: Chronic | ICD-10-CM

## 2021-01-01 DIAGNOSIS — D69.6 THROMBOCYTOPENIA (HCC): ICD-10-CM

## 2021-01-01 DIAGNOSIS — C91.10 CHRONIC LYMPHATIC LEUKEMIA (HCC): Chronic | ICD-10-CM

## 2021-01-01 DIAGNOSIS — E78.5 HYPERLIPIDEMIA ASSOCIATED WITH TYPE 2 DIABETES MELLITUS (HCC): Chronic | ICD-10-CM

## 2021-01-01 DIAGNOSIS — K91.89 POSTCHOLECYSTECTOMY DIARRHEA: Chronic | ICD-10-CM

## 2021-01-01 DIAGNOSIS — E11.9 TYPE 2 DIABETES MELLITUS WITHOUT COMPLICATION, WITHOUT LONG-TERM CURRENT USE OF INSULIN (HCC): ICD-10-CM

## 2021-01-01 DIAGNOSIS — E66.09 CLASS 1 OBESITY DUE TO EXCESS CALORIES WITH SERIOUS COMORBIDITY AND BODY MASS INDEX (BMI) OF 30.0 TO 30.9 IN ADULT: Chronic | ICD-10-CM

## 2021-01-01 DIAGNOSIS — E78.2 MIXED HYPERLIPIDEMIA: Chronic | ICD-10-CM

## 2021-01-01 DIAGNOSIS — E83.42 HYPOMAGNESEMIA: ICD-10-CM

## 2021-01-01 DIAGNOSIS — E11.69 HYPERLIPIDEMIA ASSOCIATED WITH TYPE 2 DIABETES MELLITUS (HCC): Chronic | ICD-10-CM

## 2021-01-01 DIAGNOSIS — F33.42 RECURRENT MAJOR DEPRESSIVE DISORDER, IN FULL REMISSION (HCC): Chronic | ICD-10-CM

## 2021-01-01 DIAGNOSIS — Z11.59 ENCOUNTER FOR HEPATITIS C SCREENING TEST FOR LOW RISK PATIENT: ICD-10-CM

## 2021-01-01 LAB
25(OH)D3 SERPL-MCNC: 52.8 NG/ML (ref 30–100)
ALBUMIN SERPL-MCNC: 3.9 G/DL (ref 3.5–5)
ALBUMIN/GLOB SERPL: 1.6 G/DL (ref 1.1–1.8)
ALP SERPL-CCNC: 103 U/L (ref 38–126)
ALT SERPL W P-5'-P-CCNC: 12 U/L
ANION GAP SERPL CALCULATED.3IONS-SCNC: 2 MMOL/L (ref 5–15)
ANISOCYTOSIS BLD QL: ABNORMAL
ARTICHOKE IGE QN: 77 MG/DL (ref 0–100)
AST SERPL-CCNC: 25 U/L (ref 14–36)
BILIRUB SERPL-MCNC: 2 MG/DL (ref 0.2–1.3)
BUN SERPL-MCNC: 16 MG/DL (ref 7–23)
BUN/CREAT SERPL: 15.5 (ref 7–25)
CALCIUM SPEC-SCNC: 8.6 MG/DL (ref 8.4–10.2)
CHLORIDE SERPL-SCNC: 106 MMOL/L (ref 101–112)
CO2 SERPL-SCNC: 32 MMOL/L (ref 22–30)
CREAT SERPL-MCNC: 1.03 MG/DL (ref 0.52–1.04)
DEPRECATED RDW RBC AUTO: 51.2 FL (ref 37–54)
ELLIPTOCYTES BLD QL SMEAR: ABNORMAL
ERYTHROCYTE [DISTWIDTH] IN BLOOD BY AUTOMATED COUNT: 15.1 % (ref 12.3–15.4)
FERRITIN SERPL-MCNC: 82.8 NG/ML (ref 13–150)
GFR SERPL CREATININE-BSD FRML MDRD: 53 ML/MIN/1.73 (ref 45–104)
GLOBULIN UR ELPH-MCNC: 2.4 GM/DL (ref 2.3–3.5)
GLUCOSE SERPL-MCNC: 104 MG/DL (ref 70–99)
HBA1C MFR BLD: 5.81 % (ref 4.8–5.6)
HCT VFR BLD AUTO: 36.6 % (ref 34–46.6)
HGB BLD-MCNC: 11.4 G/DL (ref 12–15.9)
LYMPHOCYTES # BLD MANUAL: 46.08 10*3/MM3 (ref 0.7–3.1)
LYMPHOCYTES NFR BLD MANUAL: 7 % (ref 5–12)
LYMPHOCYTES NFR BLD MANUAL: 88 % (ref 19.6–45.3)
MAGNESIUM SERPL-MCNC: 1.7 MG/DL (ref 1.6–2.3)
MCH RBC QN AUTO: 30.5 PG (ref 26.6–33)
MCHC RBC AUTO-ENTMCNC: 31.1 G/DL (ref 31.5–35.7)
MCV RBC AUTO: 97.9 FL (ref 79–97)
MONOCYTES # BLD AUTO: 3.67 10*3/MM3 (ref 0.1–0.9)
NEUTROPHILS # BLD AUTO: 2.62 10*3/MM3 (ref 1.7–7)
NEUTROPHILS NFR BLD MANUAL: 5 % (ref 42.7–76)
PLATELET # BLD AUTO: 82 10*3/MM3 (ref 140–450)
PMV BLD AUTO: 9.2 FL (ref 6–12)
POTASSIUM SERPL-SCNC: 4.3 MMOL/L (ref 3.4–5)
PROT SERPL-MCNC: 6.3 G/DL (ref 6.3–8.6)
RBC # BLD AUTO: 3.74 10*6/MM3 (ref 3.77–5.28)
SCAN SLIDE: NORMAL
SMALL PLATELETS BLD QL SMEAR: ABNORMAL
SODIUM SERPL-SCNC: 140 MMOL/L (ref 137–145)
VIT B12 BLD-MCNC: 1039 PG/ML (ref 211–946)
WBC # BLD AUTO: 52.36 10*3/MM3 (ref 3.4–10.8)
WBC MORPH BLD: NORMAL

## 2021-01-01 PROCEDURE — 0001A: CPT | Performed by: THORACIC SURGERY (CARDIOTHORACIC VASCULAR SURGERY)

## 2021-01-01 PROCEDURE — 36415 COLL VENOUS BLD VENIPUNCTURE: CPT | Performed by: INTERNAL MEDICINE

## 2021-01-01 PROCEDURE — 0002A: CPT | Performed by: THORACIC SURGERY (CARDIOTHORACIC VASCULAR SURGERY)

## 2021-01-01 PROCEDURE — G2025 DIS SITE TELE SVCS RHC/FQHC: HCPCS | Performed by: INTERNAL MEDICINE

## 2021-01-01 PROCEDURE — 83735 ASSAY OF MAGNESIUM: CPT | Performed by: INTERNAL MEDICINE

## 2021-01-01 PROCEDURE — 82728 ASSAY OF FERRITIN: CPT | Performed by: INTERNAL MEDICINE

## 2021-01-01 PROCEDURE — 85025 COMPLETE CBC W/AUTO DIFF WBC: CPT | Performed by: INTERNAL MEDICINE

## 2021-01-01 PROCEDURE — 83036 HEMOGLOBIN GLYCOSYLATED A1C: CPT | Performed by: INTERNAL MEDICINE

## 2021-01-01 PROCEDURE — 83721 ASSAY OF BLOOD LIPOPROTEIN: CPT | Performed by: INTERNAL MEDICINE

## 2021-01-01 PROCEDURE — 80053 COMPREHEN METABOLIC PANEL: CPT | Performed by: INTERNAL MEDICINE

## 2021-01-01 PROCEDURE — G0439 PPPS, SUBSEQ VISIT: HCPCS | Performed by: INTERNAL MEDICINE

## 2021-01-01 PROCEDURE — 82306 VITAMIN D 25 HYDROXY: CPT | Performed by: INTERNAL MEDICINE

## 2021-01-01 PROCEDURE — 91300 HC SARSCOV02 VAC 30MCG/0.3ML IM: CPT | Performed by: THORACIC SURGERY (CARDIOTHORACIC VASCULAR SURGERY)

## 2021-01-01 PROCEDURE — 82607 VITAMIN B-12: CPT | Performed by: INTERNAL MEDICINE

## 2021-01-01 RX ORDER — TOLTERODINE 4 MG/1
4 CAPSULE, EXTENDED RELEASE ORAL
Qty: 30 CAPSULE | Refills: 11 | Status: SHIPPED | OUTPATIENT
Start: 2021-01-01 | End: 2021-01-01 | Stop reason: SINTOL

## 2021-01-01 RX ORDER — ALPRAZOLAM 0.5 MG/1
.5-1 TABLET ORAL NIGHTLY PRN
Qty: 60 TABLET | Refills: 2 | Status: SHIPPED | OUTPATIENT
Start: 2021-01-01

## 2021-01-01 RX ORDER — CHOLESTYRAMINE 4 G/9G
POWDER, FOR SUSPENSION ORAL
Qty: 1 EACH | Refills: 11 | Status: SHIPPED | OUTPATIENT
Start: 2021-01-01

## 2021-01-01 RX ORDER — LOPERAMIDE HYDROCHLORIDE 2 MG/1
TABLET ORAL
COMMUNITY
Start: 2021-01-01

## 2021-01-01 RX ORDER — HYOSCYAMINE SULFATE 0.125 MG
0.12 TABLET ORAL
Qty: 360 TABLET | Refills: 3 | Status: SHIPPED | OUTPATIENT
Start: 2021-01-01 | End: 2021-01-01 | Stop reason: SINTOL

## 2021-03-02 PROBLEM — N32.81 OAB (OVERACTIVE BLADDER): Chronic | Status: ACTIVE | Noted: 2021-01-01

## 2021-03-02 PROBLEM — I25.118 ATHEROSCLEROSIS OF NATIVE CORONARY ARTERY OF NATIVE HEART WITH STABLE ANGINA PECTORIS (HCC): Status: ACTIVE | Noted: 2018-04-03

## 2021-03-02 PROBLEM — E11.69 HYPERLIPIDEMIA ASSOCIATED WITH TYPE 2 DIABETES MELLITUS (HCC): Chronic | Status: ACTIVE | Noted: 2018-04-03

## 2021-03-02 PROBLEM — I83.819 VARICOSE VEINS WITH PAIN: Status: ACTIVE | Noted: 2018-04-03

## 2021-03-02 PROBLEM — E78.5 DYSLIPIDEMIA: Status: ACTIVE | Noted: 2018-04-03

## 2021-03-02 PROBLEM — E11.9 DIABETES MELLITUS (HCC): Status: ACTIVE | Noted: 2021-01-01

## 2021-03-02 PROBLEM — E78.5 HYPERLIPIDEMIA ASSOCIATED WITH TYPE 2 DIABETES MELLITUS (HCC): Chronic | Status: ACTIVE | Noted: 2018-04-03

## 2021-03-02 PROBLEM — E78.2 MIXED HYPERLIPIDEMIA: Chronic | Status: RESOLVED | Noted: 2017-01-31 | Resolved: 2021-01-01

## 2021-03-02 PROBLEM — I25.118 ATHEROSCLEROSIS OF NATIVE CORONARY ARTERY OF NATIVE HEART WITH STABLE ANGINA PECTORIS (HCC): Chronic | Status: ACTIVE | Noted: 2018-04-03

## 2021-03-02 PROBLEM — E11.69 HYPERLIPIDEMIA ASSOCIATED WITH TYPE 2 DIABETES MELLITUS (HCC): Status: ACTIVE | Noted: 2018-04-03

## 2021-03-02 PROBLEM — M53.9 BACK PROBLEM: Status: ACTIVE | Noted: 2021-01-01

## 2021-03-02 PROBLEM — E11.9 DIABETES MELLITUS (HCC): Status: RESOLVED | Noted: 2021-01-01 | Resolved: 2021-01-01

## 2021-03-02 NOTE — PATIENT INSTRUCTIONS
Diabetes Mellitus and Exercise  Exercising regularly is important for your overall health, especially when you have diabetes (diabetes mellitus). Exercising is not only about losing weight. It has many other health benefits, such as increasing muscle strength and bone density and reducing body fat and stress. This leads to improved fitness, flexibility, and endurance, all of which result in better overall health.  Exercise has additional benefits for people with diabetes, including:  · Reducing appetite.  · Helping to lower and control blood glucose.  · Lowering blood pressure.  · Helping to control amounts of fatty substances (lipids) in the blood, such as cholesterol and triglycerides.  · Helping the body to respond better to insulin (improving insulin sensitivity).  · Reducing how much insulin the body needs.  · Decreasing the risk for heart disease by:  ? Lowering cholesterol and triglyceride levels.  ? Increasing the levels of good cholesterol.  ? Lowering blood glucose levels.  What is my activity plan?  Your health care provider or certified diabetes educator can help you make a plan for the type and frequency of exercise (activity plan) that works for you. Make sure that you:  · Do at least 150 minutes of moderate-intensity or vigorous-intensity exercise each week. This could be brisk walking, biking, or water aerobics.  ? Do stretching and strength exercises, such as yoga or weightlifting, at least 2 times a week.  ? Spread out your activity over at least 3 days of the week.  · Get some form of physical activity every day.  ? Do not go more than 2 days in a row without some kind of physical activity.  ? Avoid being inactive for more than 30 minutes at a time. Take frequent breaks to walk or stretch.  · Choose a type of exercise or activity that you enjoy, and set realistic goals.  · Start slowly, and gradually increase the intensity of your exercise over time.  What do I need to know about managing my  diabetes?    · Check your blood glucose before and after exercising.  ? If your blood glucose is 240 mg/dL (13.3 mmol/L) or higher before you exercise, check your urine for ketones. If you have ketones in your urine, do not exercise until your blood glucose returns to normal.  ? If your blood glucose is 100 mg/dL (5.6 mmol/L) or lower, eat a snack containing 15-20 grams of carbohydrate. Check your blood glucose 15 minutes after the snack to make sure that your level is above 100 mg/dL (5.6 mmol/L) before you start your exercise.  · Know the symptoms of low blood glucose (hypoglycemia) and how to treat it. Your risk for hypoglycemia increases during and after exercise. Common symptoms of hypoglycemia can include:  ? Hunger.  ? Anxiety.  ? Sweating and feeling clammy.  ? Confusion.  ? Dizziness or feeling light-headed.  ? Increased heart rate or palpitations.  ? Blurry vision.  ? Tingling or numbness around the mouth, lips, or tongue.  ? Tremors or shakes.  ? Irritability.  · Keep a rapid-acting carbohydrate snack available before, during, and after exercise to help prevent or treat hypoglycemia.  · Avoid injecting insulin into areas of the body that are going to be exercised. For example, avoid injecting insulin into:  ? The arms, when playing tennis.  ? The legs, when jogging.  · Keep records of your exercise habits. Doing this can help you and your health care provider adjust your diabetes management plan as needed. Write down:  ? Food that you eat before and after you exercise.  ? Blood glucose levels before and after you exercise.  ? The type and amount of exercise you have done.  ? When your insulin is expected to peak, if you use insulin. Avoid exercising at times when your insulin is peaking.  · When you start a new exercise or activity, work with your health care provider to make sure the activity is safe for you, and to adjust your insulin, medicines, or food intake as needed.  · Drink plenty of water while  you exercise to prevent dehydration or heat stroke. Drink enough fluid to keep your urine clear or pale yellow.  Summary  · Exercising regularly is important for your overall health, especially when you have diabetes (diabetes mellitus).  · Exercising has many health benefits, such as increasing muscle strength and bone density and reducing body fat and stress.  · Your health care provider or certified diabetes educator can help you make a plan for the type and frequency of exercise (activity plan) that works for you.  · When you start a new exercise or activity, work with your health care provider to make sure the activity is safe for you, and to adjust your insulin, medicines, or food intake as needed.  This information is not intended to replace advice given to you by your health care provider. Make sure you discuss any questions you have with your health care provider.  Document Revised: 07/12/2018 Document Reviewed: 05/29/2017  [employer] Patient Education © 2020 [employer]      Calorie Counting for Weight Loss  Calories are units of energy. Your body needs a certain amount of calories from food to keep you going throughout the day. When you eat more calories than your body needs, your body stores the extra calories as fat. When you eat fewer calories than your body needs, your body burns fat to get the energy it needs.  Calorie counting means keeping track of how many calories you eat and drink each day. Calorie counting can be helpful if you need to lose weight. If you make sure to eat fewer calories than your body needs, you should lose weight. Ask your health care provider what a healthy weight is for you.  For calorie counting to work, you will need to eat the right number of calories in a day in order to lose a healthy amount of weight per week. A dietitian can help you determine how many calories you need in a day and will give you suggestions on how to reach your calorie goal.  · A healthy amount of  weight to lose per week is usually 1-2 lb (0.5-0.9 kg). This usually means that your daily calorie intake should be reduced by 500-750 calories.  · Eating 1,200 - 1,500 calories per day can help most women lose weight.  · Eating 1,500 - 1,800 calories per day can help most men lose weight.  What is my plan?  My goal is to have __________ calories per day.  If I have this many calories per day, I should lose around __________ pounds per week.  What do I need to know about calorie counting?  In order to meet your daily calorie goal, you will need to:  · Find out how many calories are in each food you would like to eat. Try to do this before you eat.  · Decide how much of the food you plan to eat.  · Write down what you ate and how many calories it had. Doing this is called keeping a food log.  To successfully lose weight, it is important to balance calorie counting with a healthy lifestyle that includes regular activity. Aim for 150 minutes of moderate exercise (such as walking) or 75 minutes of vigorous exercise (such as running) each week.  Where do I find calorie information?    The number of calories in a food can be found on a Nutrition Facts label. If a food does not have a Nutrition Facts label, try to look up the calories online or ask your dietitian for help.  Remember that calories are listed per serving. If you choose to have more than one serving of a food, you will have to multiply the calories per serving by the amount of servings you plan to eat. For example, the label on a package of bread might say that a serving size is 1 slice and that there are 90 calories in a serving. If you eat 1 slice, you will have eaten 90 calories. If you eat 2 slices, you will have eaten 180 calories.  How do I keep a food log?  Immediately after each meal, record the following information in your food log:  · What you ate. Don't forget to include toppings, sauces, and other extras on the food.  · How much you ate. This can  "be measured in cups, ounces, or number of items.  · How many calories each food and drink had.  · The total number of calories in the meal.  Keep your food log near you, such as in a small notebook in your pocket, or use a mobile eloisa or website. Some programs will calculate calories for you and show you how many calories you have left for the day to meet your goal.  What are some calorie counting tips?    · Use your calories on foods and drinks that will fill you up and not leave you hungry:  ? Some examples of foods that fill you up are nuts and nut butters, vegetables, lean proteins, and high-fiber foods like whole grains. High-fiber foods are foods with more than 5 g fiber per serving.  ? Drinks such as sodas, specialty coffee drinks, alcohol, and juices have a lot of calories, yet do not fill you up.  · Eat nutritious foods and avoid empty calories. Empty calories are calories you get from foods or beverages that do not have many vitamins or protein, such as candy, sweets, and soda. It is better to have a nutritious high-calorie food (such as an avocado) than a food with few nutrients (such as a bag of chips).  · Know how many calories are in the foods you eat most often. This will help you calculate calorie counts faster.  · Pay attention to calories in drinks. Low-calorie drinks include water and unsweetened drinks.  · Pay attention to nutrition labels for \"low fat\" or \"fat free\" foods. These foods sometimes have the same amount of calories or more calories than the full fat versions. They also often have added sugar, starch, or salt, to make up for flavor that was removed with the fat.  · Find a way of tracking calories that works for you. Get creative. Try different apps or programs if writing down calories does not work for you.  What are some portion control tips?  · Know how many calories are in a serving. This will help you know how many servings of a certain food you can have.  · Use a measuring cup to " measure serving sizes. You could also try weighing out portions on a kitchen scale. With time, you will be able to estimate serving sizes for some foods.  · Take some time to put servings of different foods on your favorite plates, bowls, and cups so you know what a serving looks like.  · Try not to eat straight from a bag or box. Doing this can lead to overeating. Put the amount you would like to eat in a cup or on a plate to make sure you are eating the right portion.  · Use smaller plates, glasses, and bowls to prevent overeating.  · Try not to multitask (for example, watch TV or use your computer) while eating. If it is time to eat, sit down at a table and enjoy your food. This will help you to know when you are full. It will also help you to be aware of what you are eating and how much you are eating.  What are tips for following this plan?  Reading food labels  · Check the calorie count compared to the serving size. The serving size may be smaller than what you are used to eating.  · Check the source of the calories. Make sure the food you are eating is high in vitamins and protein and low in saturated and trans fats.  Shopping  · Read nutrition labels while you shop. This will help you make healthy decisions before you decide to purchase your food.  · Make a grocery list and stick to it.  Cooking  · Try to cook your favorite foods in a healthier way. For example, try baking instead of frying.  · Use low-fat dairy products.  Meal planning  · Use more fruits and vegetables. Half of your plate should be fruits and vegetables.  · Include lean proteins like poultry and fish.  How do I count calories when eating out?  · Ask for smaller portion sizes.  · Consider sharing an entree and sides instead of getting your own entree.  · If you get your own entree, eat only half. Ask for a box at the beginning of your meal and put the rest of your entree in it so you are not tempted to eat it.  · If calories are listed on  "the menu, choose the lower calorie options.  · Choose dishes that include vegetables, fruits, whole grains, low-fat dairy products, and lean protein.  · Choose items that are boiled, broiled, grilled, or steamed. Stay away from items that are buttered, battered, fried, or served with cream sauce. Items labeled \"crispy\" are usually fried, unless stated otherwise.  · Choose water, low-fat milk, unsweetened iced tea, or other drinks without added sugar. If you want an alcoholic beverage, choose a lower calorie option such as a glass of wine or light beer.  · Ask for dressings, sauces, and syrups on the side. These are usually high in calories, so you should limit the amount you eat.  · If you want a salad, choose a garden salad and ask for grilled meats. Avoid extra toppings like rodriguez, cheese, or fried items. Ask for the dressing on the side, or ask for olive oil and vinegar or lemon to use as dressing.  · Estimate how many servings of a food you are given. For example, a serving of cooked rice is ½ cup or about the size of half a baseball. Knowing serving sizes will help you be aware of how much food you are eating at restaurants. The list below tells you how big or small some common portion sizes are based on everyday objects:  ? 1 oz--4 stacked dice.  ? 3 oz--1 deck of cards.  ? 1 tsp--1 die.  ? 1 Tbsp--½ a ping-pong ball.  ? 2 Tbsp--1 ping-pong ball.  ? ½ cup--½ baseball.  ? 1 cup--1 baseball.  Summary  · Calorie counting means keeping track of how many calories you eat and drink each day. If you eat fewer calories than your body needs, you should lose weight.  · A healthy amount of weight to lose per week is usually 1-2 lb (0.5-0.9 kg). This usually means reducing your daily calorie intake by 500-750 calories.  · The number of calories in a food can be found on a Nutrition Facts label. If a food does not have a Nutrition Facts label, try to look up the calories online or ask your dietitian for help.  · Use your " calories on foods and drinks that will fill you up, and not on foods and drinks that will leave you hungry.  · Use smaller plates, glasses, and bowls to prevent overeating.  This information is not intended to replace advice given to you by your health care provider. Make sure you discuss any questions you have with your health care provider.  Document Revised: 09/06/2019 Document Reviewed: 11/17/2017  [employer] Patient Education © 2020 [employer]

## 2021-03-02 NOTE — PROGRESS NOTES
You have chosen to receive care through a telephone visit. Do you consent to use a telephone visit for your medical care today? Yes    Total visit time:  32 minutes.     Chief Complaint  Follow-up (6 month ), Urinary Frequency (x 1 month. Gets up a lot at night ), Diarrhea (off and on. She hasnt been taking Levsin ), and Med Refill (Xanax)    Subjective          History of Present Illness     Chio aMry receives care via telephone visit through [facility] for six month follow up on multiple medical issues including diabetes, hypertension, high cholesterol, CLL, vitamin D deficiency, and obesity among other issues. She has history of right nephrectomy by Dr. Franke in 2003 due to RCCA.      She continues to follow with Dr. Soriano, [facility] every three months for history of CLL. Her lymphocyte count continues to elevate, for which Dr. Soriano anticipates treatment within the next 1-2 years.  He is planning to initiate Ibrutinib.      She reports episodic diarrhea, but has not been taking Levsin, which worked well in the past.  She has noticed increasing difficulty going out in public due to the bowel urgency.   She has history of prior cholecystectomy.      She continues on a combination of Xanax and fluoxetine for anxiety and sleep.  She feels issues are well controlled with the medications and denies significant side effects with the Xanax including excessive daytime sedation     She also reports nocturia 6-7 times nightly.  She has better daytime bladder control.   Her diabetes is well controlled with A1c 5.8.      She has extensive osteoarthritis exacerbated by historic obesity with history of bilateral hip replacement as well as right shoulder replacement and additional osteoarthritis/DJD involving multiple joints who struggles with chronic bilateral knee pain.  We injected the right knee in October with good results and she is very pleased.  "    DEXA 11/2016 revealed normal bone density..  Vitamin D at goal.      She will be due repeat colonoscopy next month.   Colonoscopy 04/2018 by Dr. Gutierrez revealed diverticulosis in the sigmoid colon as well as small tubular adenomatous polyp and 2 lipomas.  We will refer her back with her next visit if she has not been contacted by Dr. Gutierrez's office.  She had her first COVID vaccine last week and is scheduled for 2nd vaccine on March 17th.       The patient's relevant past medical, surgical, and social history was reviewed in Epic.   Lab results are reviewed with the patient today.   CBC consistent with CLL. Fasting glucose 104.  A1c 5.8.  Renal and liver function normal.  LDL 77.      Objective   Vital Signs:     Ht 162.6 cm (64\")   Wt 81.6 kg (180 lb)   BMI 30.90 kg/m²    No blood pressure obtained today with this telephone visit due to coronavirus pandemic.      Physical Exam   Result Review :     CMP    CMP 8/21/20 12/29/20 2/24/21   Glucose 93  104 (A)   BUN 18  16   Creatinine 0.97  1.03   eGFR Non African Am 57  53   Sodium 141  140   Potassium 4.0  4.3   Chloride 105  106   Calcium 8.7  8.6   Albumin 4.00 3.5 3.90   Total Bilirubin 1.9 (A)  2.0 (A)   Alkaline Phosphatase 100  103   AST (SGOT) 24  25   ALT (SGPT) 13  12   (A) Abnormal value            CBC w/diff    CBC w/Diff 5/6/20 8/21/20 2/24/21   WBC 24.07 (A) 25.66 (A) 52.36 (A)   RBC 4.56 3.85 3.74 (A)   Hemoglobin 14.2 12.2 11.4 (A)   Hematocrit 43.0 37.7 36.6   MCV 94.3 97.9 (A) 97.9 (A)   MCH 31.1 31.7 30.5   MCHC 33.0 32.4 31.1 (A)   RDW 15.0 14.7 15.1   Platelets 114 (A) 80 (A) 82 (A)   (A) Abnormal value            Lipid Panel    Lipid Panel 8/21/20 2/24/21   Total Cholesterol 125 (A)    Triglycerides 124    HDL Cholesterol 27 (A)    VLDL Cholesterol 24.8    LDL Cholesterol  73 77   LDL/HDL Ratio 2.71    (A) Abnormal value            TSH    TSH 8/21/20   TSH 1.770           A1C Last 3 Results    HGBA1C Last 3 Results 8/21/20 2/24/21 "   Hemoglobin A1C 5.60 5.81 (A)   (A) Abnormal value                      Assessment and Plan    Diagnoses and all orders for this visit:    1. Type 2 diabetes mellitus without complication, without long-term current use of insulin (CMS/[facility]) (Primary)  -     CBC Auto Differential; Future  -     Comprehensive Metabolic Panel; Future  -     Hemoglobin A1c; Future  -     TSH; Future  -     Microalbumin / Creatinine Urine Ratio - Urine, Clean Catch; Future    2. Chronic lymphatic leukemia (CMS/[facility]) - Dr. Soriano  -     CBC Auto Differential; Future    3. Iron deficiency anemia secondary to blood loss (chronic)  -     Ferritin; Future    4. Other dietary vitamin B12 deficiency anemia  -     Vitamin B12; Future    5. Recurrent major depressive disorder, in full remission (CMS/[facility])    6. Generalized anxiety disorder  -     ALPRAZolam (XANAX) 0.5 MG tablet; Take 1-2 tablets by mouth At Night As Needed for Sleep.  Dispense: 60 tablet; Refill: 2    7. Sleep disorder    8. Hyperlipidemia associated with type 2 diabetes mellitus (CMS/[facility])  -     Lipid Panel; Future    9. Essential hypertension  -     Comprehensive Metabolic Panel; Future    10. Atherosclerosis of native coronary artery of native heart with stable angina pectoris (CMS/[facility])  -     Lipid Panel; Future    11. Vitamin D deficiency  -     Vitamin D 25 Hydroxy; Future    12. Class 1 obesity due to excess calories with serious comorbidity and body mass index (BMI) of 30.0 to 30.9 in adult    13. OAB (overactive bladder)    Other orders  -     hyoscyamine (ANASPAZ,LEVSIN) 0.125 MG tablet; Take 1 tablet by mouth 4 (Four) Times a Day With Meals & at Bedtime.  Dispense: 360 tablet; Refill: 3  -     tolterodine LA (Detrol LA) 4 MG 24 hr capsule; Take 1 capsule by mouth every night at bedtime.  Dispense: 30 capsule; Refill: 11           Continue the low-dose fluoxetine.  Continue the use of Xanax at night as needed for sleep.  We provided Xanax refills today.  She denies  any tolerability issues, such as sleepwalking or excessive sedation. Patient understands the risks associated with this controlled medication, including tolerance and addiction.  she also agrees to only obtain this medication from me, and not from a another provider, unless that provider is covering for me in my absence.  she also agrees to be compliant in dosing, and not self adjust the dose of medication.  A signed controlled substance agreement is on file, and she has received a controlled substance education sheet at this or a previous visit.  she has also signed a consent for treatment with a controlled substance as per [facility] policy. MURALI was obtained.    I sent a prescription for Detrol LA to take one q.h.s. for overactive bladder/nocturia with some component of urinary incontinence.  Recommended fluid restriction after evening meal.     I sent refills for Levsin to treat the episodes of diarrhea.  Anticholinergic side effects of Detrol may be helpful.  Her gallbladder was previously removed, so she should notify me if this is not effective in improving symptoms.  Cholestyramine may be helpful.    Continue the Lipitor and dietary efforts.  LDL at goal.      Continue current BP medications and monitoring.  Notify me if not consistently at goal. Pursue weight loss.      Continue vitamin B-12.  Vitamin B-12 at goal.      Continue to follow with Dr. Soriano, hematologist/oncologist in [place], to address CLL and thrombocytopenia.  He is planning to try Ibrutinib for her elevating lymphocyte count.     Continue the current diabetic management and monitoring.  Diabetes at goal with weekly Ozempic. Metformin produced diarrhea.  Continue and intensify efforts at diabetic diet, exercise, and weight loss.      Return to clinic in 6 months with fasting labs prior.  We will refer her back to Dr. Gutierrez with her next visit if she has not been contacted by his office to schedule colonoscopy.      Scribed for  Dr. Quiñonez by Catherine Maldonado [facility].     Follow Up   Return in about 6 months (around 9/2/2021) for Next scheduled follow up, Follow up in six months with labs one week prior..  Patient was given instructions and counseling regarding her condition or for health maintenance advice. Please see specific information pulled into the AVS if appropriate.

## 2021-03-08 NOTE — TELEPHONE ENCOUNTER
Relayed new orders and voiced understanding. TP        ----- Message from Michael Quiñonez MD sent at 3/8/2021 12:02 PM CST -----  Stop both Detrol and Levsin.  Try OTC Imodium A-D 1 each morning for the diarrhea.  Myrbetriq 25 mg daily for overactive bladder.  EB  ----- Message -----  From: Nataliia Ventura RN  Sent: 3/8/2021  10:06 AM CST  To: Michael Quiñonez MD    She had apt last week and was prescribed Detrol for increased urination and Levsin for diarrhea. She has been on  both x 4 days and states since taking these she is very fatigued and feels bad. States neither frequent urination or diarrhea has subsided and now has nausea. Please advise. Nataliia         negative - no fever

## 2021-03-15 PROBLEM — K91.89 POSTCHOLECYSTECTOMY DIARRHEA: Chronic | Status: ACTIVE | Noted: 2021-01-01

## 2021-03-15 PROBLEM — R19.7 POSTCHOLECYSTECTOMY DIARRHEA: Chronic | Status: ACTIVE | Noted: 2021-01-01

## 2021-03-15 NOTE — PROGRESS NOTES
You have chosen to receive care through a telephone visit. Do you consent to use a telephone visit for your medical care today? Yes    The ABCs of the Annual Wellness Visit  Subsequent Medicare Wellness Visit    Chief Complaint   Patient presents with   • Medicare Wellness-subsequent       Subjective   History of Present Illness:  Chio Mary is a 65 y.o. female who presents for a Subsequent Medicare Wellness Visit.    HEALTH RISK ASSESSMENT    Recent Hospitalizations:  No hospitalization(s) within the last year.    Current Medical Providers:  Patient Care Team:  Michael Quiñonez MD as PCP - General Hernandez, ISMAEL Cordova as Gynecologist (Nurse Practitioner)    Smoking Status:  Social History     Tobacco Use   Smoking Status Former Smoker   • Quit date:    • Years since quittin.2   Smokeless Tobacco Never Used       Alcohol Consumption:  Social History     Substance and Sexual Activity   Alcohol Use No       Depression Screen:   PHQ-2/PHQ-9 Depression Screening 3/15/2021   Little interest or pleasure in doing things 0   Feeling down, depressed, or hopeless 0   Trouble falling or staying asleep, or sleeping too much -   Feeling tired or having little energy -   Poor appetite or overeating -   Feeling bad about yourself - or that you are a failure or have let yourself or your family down -   Trouble concentrating on things, such as reading the newspaper or watching television -   Moving or speaking so slowly that other people could have noticed. Or the opposite - being so fidgety or restless that you have been moving around a lot more than usual -   Thoughts that you would be better off dead, or of hurting yourself in some way -   Total Score 0   If you checked off any problems, how difficult have these problems made it for you to do your work, take care of things at home, or get along with other people? -       Fall Risk Screen:  STEADI Fall Risk Assessment was completed, and patient is at  LOW risk for falls.Assessment completed on:3/15/2021    Health Habits and Functional and Cognitive Screening:  Functional & Cognitive Status 3/15/2021   Do you have difficulty preparing food and eating? No   Do you have difficulty bathing yourself, getting dressed or grooming yourself? No   Do you have difficulty using the toilet? No   Do you have difficulty moving around from place to place? No   Do you have trouble with steps or getting out of a bed or a chair? No   Current Diet Limited Junk Food   Dental Exam Up to date   Eye Exam Up to date   Exercise (times per week) 3 times per week   Current Exercise Activities Include Housecleaning   Do you need help using the phone?  No   Are you deaf or do you have serious difficulty hearing?  No   Do you need help with transportation? No   Do you need help shopping? No   Do you need help preparing meals?  No   Do you need help with housework?  No   Do you need help with laundry? No   Do you need help taking your medications? No   Do you need help managing money? No   Do you ever drive or ride in a car without wearing a seat belt? No   Have you felt unusual stress, anger or loneliness in the last month? No   Who do you live with? Alone   If you need help, do you have trouble finding someone available to you? No   Have you been bothered in the last four weeks by sexual problems? No   Do you have difficulty concentrating, remembering or making decisions? No         Does the patient have evidence of cognitive impairment? No    Asprin use counseling:Taking ASA appropriately as indicated    Age-appropriate Screening Schedule:  Refer to the list below for future screening recommendations based on patient's age, sex and/or medical conditions. Orders for these recommended tests are listed in the plan section. The patient has been provided with a written plan.    Health Maintenance   Topic Date Due   • DIABETIC FOOT EXAM  Never done   • DXA SCAN  11/14/2018   • COLONOSCOPY   04/06/2021   • URINE MICROALBUMIN  08/21/2021   • HEMOGLOBIN A1C  08/24/2021   • PAP SMEAR  09/24/2021   • DIABETIC EYE EXAM  09/24/2021   • LIPID PANEL  02/24/2022   • MAMMOGRAM  06/25/2022   • TDAP/TD VACCINES (2 - Td) 09/10/2029   • INFLUENZA VACCINE  Completed   • ZOSTER VACCINE  Completed          The following portions of the patient's history were reviewed and updated as appropriate:   She  has a past medical history of Abnormal liver function, Anxiety, Blood in feces, Chronic lymphatic leukemia (CMS/HCC), Degenerative joint disease involving multiple joints, Depressive disorder, Epigastric pain, Essential hypertension, Gastritis, Generalized anxiety disorder, H/O bone density study (11/11/2014), History of colon polyps, History of transfusion, Hypokalemia, Hypomagnesemia, Iron deficiency anemia secondary to blood loss (chronic), Migraine, OAB (overactive bladder) (3/2/2021), Obesity, Osteopenia, Other dietary vitamin B12 deficiency anemia (8/14/2018), 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  She does not have any pertinent problems on file.  She  has a past surgical history that includes inguinal hernia repair laparoscopic and umbilical hernia repair (Right, 01/2004); Cholecystectomy; Colonoscopy w/ polypectomy (02/13/2015); Colonoscopy (03/22/2010); Injection of Medication (06/23/2014); Esophagogastroduodenoscopy w/ PEG (02/13/2015); Other surgical history (03/18/2013); Injection of Medication (03/24/2015); Back surgery; Nephrectomy (Right, 2003);  Varicose vein surgery (1990); Injection of Medication (06/23/2014); Other surgical history (10/23/2013); Total hip arthroplasty; Joint replacement; Colonoscopy (N/A, 4/6/2018); Total abdominal hysterectomy w/ bilateral salpingoophorectomy (90's); and Ovarian cyst surgery.  Her family history includes Breast cancer in her sister; Cancer in an other family member; Colon cancer in an other family member; Pancreatic cancer in her mother; Thyroid disease in an other family member.  She  reports that she quit smoking about 18 years ago. She has never used smokeless tobacco. She reports that she does not drink alcohol and does not use drugs.  Current Outpatient Medications   Medication Sig Dispense Refill   • ALPRAZolam (XANAX) 0.5 MG tablet Take 1-2 tablets by mouth At Night As Needed for Sleep. 60 tablet 2   • aspirin 81 MG EC tablet Take 81 mg by mouth Daily.     • atorvastatin (LIPITOR) 40 MG tablet Take 1 tablet by mouth Daily. 90 tablet 3   • carvedilol (COREG) 12.5 MG tablet Take 1 tablet by mouth 2 (Two) Times a Day. 180 tablet 3   • cholecalciferol (VITAMIN D3) 1000 UNITS tablet Take 1,000 Units by mouth Daily.     • FLUoxetine (PROzac) 10 MG capsule Take 3 capsules by mouth Every Morning. 270 capsule 3   • glucose blood (ONETOUCH VERIO) test strip Check glucose daily  E11.9 100 each 3   • Lancets 30G misc Check one time daily E11.9 For Verio glucometer 100 each 3   • lisinopril (PRINIVIL,ZESTRIL) 20 MG tablet Take 1 tablet by mouth Daily. For blood pressure 90 tablet 3   • loratadine (CLARITIN) 10 MG tablet Take 10 mg by mouth Daily.     • Mirabegron ER (Myrbetriq) 25 MG tablet sustained-release 24 hour 24 hr tablet Take 1 tablet by mouth Daily. 30 tablet 5   • omeprazole (priLOSEC) 40 MG capsule Take 20 mg by mouth Every Morning.     • POTASSIUM CHLORIDE ER PO Take 100 mEq/day by mouth Daily.     • Semaglutide,0.25 or 0.5MG/DOS, (OZEMPIC, 0.25 OR 0.5 MG/DOSE,) 2 MG/1.5ML solution pen-injector Inject 0.5 mg under  the skin into the appropriate area as directed 1 (One) Time Per Week. 3 pen 3   • spironolactone (ALDACTONE) 25 MG tablet Take 1 tablet by mouth 2 (Two) Times a Day. 180 tablet 3   • vitamin B-12 (CYANOCOBALAMIN) 500 MCG tablet Take 2,500 mcg by mouth Every Other Day.     • vitamin C (ASCORBIC ACID) 500 MG tablet Take 500 mg by mouth Daily.     • cholestyramine (Questran) 4 GM/DOSE powder 2-4 gram (1/2-1 scoop) in glass of water 2-3 times times daily to prevent diarrhea 1 each 11   • loperamide (Imodium A-D) 2 MG tablet 1 tablet by mouth 4 times daily as needed for diarrhea       No current facility-administered medications for this visit.     Current Outpatient Medications on File Prior to Visit   Medication Sig   • ALPRAZolam (XANAX) 0.5 MG tablet Take 1-2 tablets by mouth At Night As Needed for Sleep.   • aspirin 81 MG EC tablet Take 81 mg by mouth Daily.   • atorvastatin (LIPITOR) 40 MG tablet Take 1 tablet by mouth Daily.   • carvedilol (COREG) 12.5 MG tablet Take 1 tablet by mouth 2 (Two) Times a Day.   • cholecalciferol (VITAMIN D3) 1000 UNITS tablet Take 1,000 Units by mouth Daily.   • FLUoxetine (PROzac) 10 MG capsule Take 3 capsules by mouth Every Morning.   • glucose blood (ONETOUCH VERIO) test strip Check glucose daily  E11.9   • Lancets 30G misc Check one time daily E11.9 For Verio glucometer   • lisinopril (PRINIVIL,ZESTRIL) 20 MG tablet Take 1 tablet by mouth Daily. For blood pressure   • loratadine (CLARITIN) 10 MG tablet Take 10 mg by mouth Daily.   • Mirabegron ER (Myrbetriq) 25 MG tablet sustained-release 24 hour 24 hr tablet Take 1 tablet by mouth Daily.   • omeprazole (priLOSEC) 40 MG capsule Take 20 mg by mouth Every Morning.   • POTASSIUM CHLORIDE ER PO Take 100 mEq/day by mouth Daily.   • Semaglutide,0.25 or 0.5MG/DOS, (OZEMPIC, 0.25 OR 0.5 MG/DOSE,) 2 MG/1.5ML solution pen-injector Inject 0.5 mg under the skin into the appropriate area as directed 1 (One) Time Per Week.   • spironolactone  (ALDACTONE) 25 MG tablet Take 1 tablet by mouth 2 (Two) Times a Day.   • vitamin B-12 (CYANOCOBALAMIN) 500 MCG tablet Take 2,500 mcg by mouth Every Other Day.   • vitamin C (ASCORBIC ACID) 500 MG tablet Take 500 mg by mouth Daily.   • [DISCONTINUED] Loperamide HCl (IMODIUM A-D PO) Take 1 tablet by mouth Every Morning.   • loperamide (Imodium A-D) 2 MG tablet 1 tablet by mouth 4 times daily as needed for diarrhea     No current facility-administered medications on file prior to visit.     She is allergic to nsaids, tolmetin, jardiance [empagliflozin], and metformin..    Outpatient Medications Prior to Visit   Medication Sig Dispense Refill   • ALPRAZolam (XANAX) 0.5 MG tablet Take 1-2 tablets by mouth At Night As Needed for Sleep. 60 tablet 2   • aspirin 81 MG EC tablet Take 81 mg by mouth Daily.     • atorvastatin (LIPITOR) 40 MG tablet Take 1 tablet by mouth Daily. 90 tablet 3   • carvedilol (COREG) 12.5 MG tablet Take 1 tablet by mouth 2 (Two) Times a Day. 180 tablet 3   • cholecalciferol (VITAMIN D3) 1000 UNITS tablet Take 1,000 Units by mouth Daily.     • FLUoxetine (PROzac) 10 MG capsule Take 3 capsules by mouth Every Morning. 270 capsule 3   • glucose blood (ONETOUCH VERIO) test strip Check glucose daily  E11.9 100 each 3   • Lancets 30G misc Check one time daily E11.9 For Verio glucometer 100 each 3   • lisinopril (PRINIVIL,ZESTRIL) 20 MG tablet Take 1 tablet by mouth Daily. For blood pressure 90 tablet 3   • loratadine (CLARITIN) 10 MG tablet Take 10 mg by mouth Daily.     • Mirabegron ER (Myrbetriq) 25 MG tablet sustained-release 24 hour 24 hr tablet Take 1 tablet by mouth Daily. 30 tablet 5   • omeprazole (priLOSEC) 40 MG capsule Take 20 mg by mouth Every Morning.     • POTASSIUM CHLORIDE ER PO Take 100 mEq/day by mouth Daily.     • Semaglutide,0.25 or 0.5MG/DOS, (OZEMPIC, 0.25 OR 0.5 MG/DOSE,) 2 MG/1.5ML solution pen-injector Inject 0.5 mg under the skin into the appropriate area as directed 1 (One) Time  Per Week. 3 pen 3   • spironolactone (ALDACTONE) 25 MG tablet Take 1 tablet by mouth 2 (Two) Times a Day. 180 tablet 3   • vitamin B-12 (CYANOCOBALAMIN) 500 MCG tablet Take 2,500 mcg by mouth Every Other Day.     • vitamin C (ASCORBIC ACID) 500 MG tablet Take 500 mg by mouth Daily.     • Loperamide HCl (IMODIUM A-D PO) Take 1 tablet by mouth Every Morning.     • loperamide (Imodium A-D) 2 MG tablet 1 tablet by mouth 4 times daily as needed for diarrhea       No facility-administered medications prior to visit.       Patient Active Problem List   Diagnosis   • Vitamin D deficiency   • Type 2 diabetes mellitus without complication, without long-term current use of insulin (CMS/HCC)   • Tubular adenoma - h/o   • Sleep disorder   • Prolapsed thoracic intervertebral disc   • Primary malignant neoplasm of right kidney - R nephrectomy 2003   • Osteopenia   • Obesity   • Iron deficiency anemia secondary to blood loss (chronic)   • Hypomagnesemia   • Hypokalemia   • History of colon polyps   • Generalized anxiety disorder   • Gastritis   • Essential hypertension   • Degenerative joint disease involving multiple joints   • Chronic lymphatic leukemia (CMS/HCC) - Dr. Soriano   • Encounter for colonoscopy due to history of adenomatous colonic polyps   • Diverticulosis of large intestine without hemorrhage   • Varicose veins of both lower extremities   • Recurrent major depressive disorder, in full remission (CMS/HCC)   • Other dietary vitamin B12 deficiency anemia   • Thrombocytopenia (CMS/HCC)   • Primary osteoarthritis of right knee   • Primary osteoarthritis of both knees   • Atherosclerosis of native coronary artery of native heart with stable angina pectoris (CMS/HCC)   • Back problem   • Hyperlipidemia associated with type 2 diabetes mellitus (CMS/HCC)   • Primary localized osteoarthrosis of shoulder region   • Varicose veins with pain   • OAB (overactive bladder)   • Postcholecystectomy diarrhea       Advanced Care  "Planning:  ACP discussion was held with the patient during this visit. Patient does not have an advance directive, information provided.    Review of Systems    Compared to one year ago, the patient feels her physical health is the same.  Compared to one year ago, the patient feels her mental health is the same.    Reviewed chart for potential of high risk medication in the elderly: yes  Reviewed chart for potential of harmful drug interactions in the elderly:yes    Objective         Vitals:    03/15/21 0831   Weight: 81.6 kg (180 lb)   Height: 162.6 cm (64\")   PainSc: 0-No pain       Body mass index is 30.9 kg/m².  Discussed the patient's BMI with her. The BMI is above average; BMI management plan is completed.    Physical Exam    Lab Results   Component Value Date    LDL 77 02/24/2021    HGBA1C 5.81 (H) 02/24/2021        Assessment/Plan   Medicare Risks and Personalized Health Plan  CMS Preventative Services Quick Reference  Advance Directive Discussion  Breast Cancer/Mammogram Screening  Immunizations Discussed/Encouraged (specific immunizations; Covid )  Obesity/Overweight   Urinary Incontinence    The above risks/problems have been discussed with the patient.  She will receive her second Covid vaccination this week.  We will schedule DEXA.  She gets her mammogram each summer.  We will screen for hepatitis C with next set of labs.  Detrol have been side effects and was not effective for OAB.  We have now prescribed Myrbetriq, but she has not started it.  For frequent diarrhea and episodic fecal incontinence and the patient status post cholecystectomy, start cholestyramine.  Use Imodium before going to social situations if needed, and also as needed per label directions.  Pertinent information has been shared with the patient in the After Visit Summary.  Follow up plans and orders are seen below in the Assessment/Plan Section.    Diagnoses and all orders for this visit:    1. Medicare annual wellness visit, " subsequent (Primary)    2. Menopause  -     DEXA Bone Density Axial    3. Encounter for hepatitis C screening test for low risk patient  -     Hepatitis C Antibody; Future    4. Postcholecystectomy diarrhea    5. OAB (overactive bladder)    Other orders  -     cholestyramine (Questran) 4 GM/DOSE powder; 2-4 gram (1/2-1 scoop) in glass of water 2-3 times times daily to prevent diarrhea  Dispense: 1 each; Refill: 11      Follow Up:  Return in about 1 year (around 3/15/2022) for Medicare Wellness.     An After Visit Summary and PPPS were given to the patient.

## 2021-03-15 NOTE — PATIENT INSTRUCTIONS
Medicare Wellness  Personal Prevention Plan of Service     Date of Office Visit:  03/15/2021  Encounter Provider:  Michael Quiñonez MD  Place of Service:  [facility]  Patient Name: Chio Mary  :  1955    As part of the Medicare Wellness portion of your visit today, we are providing you with this personalized preventive plan of services (PPPS). This plan is based upon recommendations of the [place] Preventive Services Task Force (USPSTF) and the Advisory Committee on Immunization Practices (ACIP).    This lists the preventive care services that should be considered, and provides dates of when you are due. Items listed as completed are up-to-date and do not require any further intervention.    Health Maintenance   Topic Date Due   • HEPATITIS C SCREENING  Never done   • DIABETIC FOOT EXAM  Never done   • DXA SCAN  2018   • ANNUAL WELLNESS VISIT  2021   • COVID-19 Vaccine (2 of 2 - Pfizer series) 2021   • COLONOSCOPY  2021   • URINE MICROALBUMIN  2021   • HEMOGLOBIN A1C  2021   • PAP SMEAR  2021   • DIABETIC EYE EXAM  2021   • LIPID PANEL  2022   • MAMMOGRAM  2022   • TDAP/TD VACCINES (2 - Td) 09/10/2029   • INFLUENZA VACCINE  Completed   • Pneumococcal Vaccine 65+  Completed   • ZOSTER VACCINE  Completed   • MENINGOCOCCAL VACCINE  Aged Out       No orders of the defined types were placed in this encounter.      Return in about 1 year (around 3/15/2022) for Medicare Wellness.          Exercising to Lose Weight  Exercise is structured, repetitive physical activity to improve fitness and health. Getting regular exercise is important for everyone. It is especially important if you are overweight. Being overweight increases your risk of heart disease, stroke, diabetes, high blood pressure, and several types of cancer. Reducing your calorie intake and exercising can help you lose weight.  Exercise is usually  categorized as moderate or vigorous intensity. To lose weight, most people need to do a certain amount of moderate-intensity or vigorous-intensity exercise each week.  Moderate-intensity exercise    Moderate-intensity exercise is any activity that gets you moving enough to burn at least three times more energy (calories) than if you were sitting.  Examples of moderate exercise include:  · Walking a mile in 15 minutes.  · Doing light yard work.  · Biking at an easy pace.  Most people should get at least 150 minutes (2 hours and 30 minutes) a week of moderate-intensity exercise to maintain their body weight.  Vigorous-intensity exercise  Vigorous-intensity exercise is any activity that gets you moving enough to burn at least six times more calories than if you were sitting. When you exercise at this intensity, you should be working hard enough that you are not able to carry on a conversation.  Examples of vigorous exercise include:  · Running.  · Playing a team sport, such as football, basketball, and soccer.  · Jumping rope.  Most people should get at least 75 minutes (1 hour and 15 minutes) a week of vigorous-intensity exercise to maintain their body weight.  How can exercise affect me?  When you exercise enough to burn more calories than you eat, you lose weight. Exercise also reduces body fat and builds muscle. The more muscle you have, the more calories you burn. Exercise also:  · Improves mood.  · Reduces stress and tension.  · Improves your overall fitness, flexibility, and endurance.  · Increases bone strength.  The amount of exercise you need to lose weight depends on:  · Your age.  · The type of exercise.  · Any health conditions you have.  · Your overall physical ability.  Talk to your health care provider about how much exercise you need and what types of activities are safe for you.  What actions can I take to lose weight?  Nutrition    · Make changes to your diet as told by your health care provider or  diet and nutrition specialist (dietitian). This may include:  ? Eating fewer calories.  ? Eating more protein.  ? Eating less unhealthy fats.  ? Eating a diet that includes fresh fruits and vegetables, whole grains, low-fat dairy products, and lean protein.  ? Avoiding foods with added fat, salt, and sugar.  · Drink plenty of water while you exercise to prevent dehydration or heat stroke.  Activity  · Choose an activity that you enjoy and set realistic goals. Your health care provider can help you make an exercise plan that works for you.  · Exercise at a moderate or vigorous intensity most days of the week.  ? The intensity of exercise may vary from person to person. You can tell how intense a workout is for you by paying attention to your breathing and heartbeat. Most people will notice their breathing and heartbeat get faster with more intense exercise.  · Do resistance training twice each week, such as:  ? Push-ups.  ? Sit-ups.  ? Lifting weights.  ? Using resistance bands.  · Getting short amounts of exercise can be just as helpful as long structured periods of exercise. If you have trouble finding time to exercise, try to include exercise in your daily routine.  ? Get up, stretch, and walk around every 30 minutes throughout the day.  ? Go for a walk during your lunch break.  ? Park your car farther away from your destination.  ? If you take public transportation, get off one stop early and walk the rest of the way.  ? Make phone calls while standing up and walking around.  ? Take the stairs instead of elevators or escalators.  · Wear comfortable clothes and shoes with good support.  · Do not exercise so much that you hurt yourself, feel dizzy, or get very short of breath.  Where to find more information  · [place] Department of Health and Human Services: www.hhs.gov  · Centers for Disease Control and Prevention (CDC): www.cdc.gov  Contact a health care provider:  · Before starting a new exercise program.  · If  you have questions or concerns about your weight.  · If you have a medical problem that keeps you from exercising.  Get help right away if you have any of the following while exercising:  · Injury.  · Dizziness.  · Difficulty breathing or shortness of breath that does not go away when you stop exercising.  · Chest pain.  · Rapid heartbeat.  Summary  · Being overweight increases your risk of heart disease, stroke, diabetes, high blood pressure, and several types of cancer.  · Losing weight happens when you burn more calories than you eat.  · Reducing the amount of calories you eat in addition to getting regular moderate or vigorous exercise each week helps you lose weight.  This information is not intended to replace advice given to you by your health care provider. Make sure you discuss any questions you have with your health care provider.  Document Revised: 12/31/2018 Document Reviewed: 12/31/2018  [employer] Patient Education © 2020 [employer]      Calorie Counting for Weight Loss  Calories are units of energy. Your body needs a certain amount of calories from food to keep you going throughout the day. When you eat more calories than your body needs, your body stores the extra calories as fat. When you eat fewer calories than your body needs, your body burns fat to get the energy it needs.  Calorie counting means keeping track of how many calories you eat and drink each day. Calorie counting can be helpful if you need to lose weight. If you make sure to eat fewer calories than your body needs, you should lose weight. Ask your health care provider what a healthy weight is for you.  For calorie counting to work, you will need to eat the right number of calories in a day in order to lose a healthy amount of weight per week. A dietitian can help you determine how many calories you need in a day and will give you suggestions on how to reach your calorie goal.  · A healthy amount of weight to lose per week is usually  1-2 lb (0.5-0.9 kg). This usually means that your daily calorie intake should be reduced by 500-750 calories.  · Eating 1,200 - 1,500 calories per day can help most women lose weight.  · Eating 1,500 - 1,800 calories per day can help most men lose weight.  What is my plan?  My goal is to have __________ calories per day.  If I have this many calories per day, I should lose around __________ pounds per week.  What do I need to know about calorie counting?  In order to meet your daily calorie goal, you will need to:  · Find out how many calories are in each food you would like to eat. Try to do this before you eat.  · Decide how much of the food you plan to eat.  · Write down what you ate and how many calories it had. Doing this is called keeping a food log.  To successfully lose weight, it is important to balance calorie counting with a healthy lifestyle that includes regular activity. Aim for 150 minutes of moderate exercise (such as walking) or 75 minutes of vigorous exercise (such as running) each week.  Where do I find calorie information?    The number of calories in a food can be found on a Nutrition Facts label. If a food does not have a Nutrition Facts label, try to look up the calories online or ask your dietitian for help.  Remember that calories are listed per serving. If you choose to have more than one serving of a food, you will have to multiply the calories per serving by the amount of servings you plan to eat. For example, the label on a package of bread might say that a serving size is 1 slice and that there are 90 calories in a serving. If you eat 1 slice, you will have eaten 90 calories. If you eat 2 slices, you will have eaten 180 calories.  How do I keep a food log?  Immediately after each meal, record the following information in your food log:  · What you ate. Don't forget to include toppings, sauces, and other extras on the food.  · How much you ate. This can be measured in cups, ounces, or  "number of items.  · How many calories each food and drink had.  · The total number of calories in the meal.  Keep your food log near you, such as in a small notebook in your pocket, or use a mobile eloisa or website. Some programs will calculate calories for you and show you how many calories you have left for the day to meet your goal.  What are some calorie counting tips?    · Use your calories on foods and drinks that will fill you up and not leave you hungry:  ? Some examples of foods that fill you up are nuts and nut butters, vegetables, lean proteins, and high-fiber foods like whole grains. High-fiber foods are foods with more than 5 g fiber per serving.  ? Drinks such as sodas, specialty coffee drinks, alcohol, and juices have a lot of calories, yet do not fill you up.  · Eat nutritious foods and avoid empty calories. Empty calories are calories you get from foods or beverages that do not have many vitamins or protein, such as candy, sweets, and soda. It is better to have a nutritious high-calorie food (such as an avocado) than a food with few nutrients (such as a bag of chips).  · Know how many calories are in the foods you eat most often. This will help you calculate calorie counts faster.  · Pay attention to calories in drinks. Low-calorie drinks include water and unsweetened drinks.  · Pay attention to nutrition labels for \"low fat\" or \"fat free\" foods. These foods sometimes have the same amount of calories or more calories than the full fat versions. They also often have added sugar, starch, or salt, to make up for flavor that was removed with the fat.  · Find a way of tracking calories that works for you. Get creative. Try different apps or programs if writing down calories does not work for you.  What are some portion control tips?  · Know how many calories are in a serving. This will help you know how many servings of a certain food you can have.  · Use a measuring cup to measure serving sizes. You could " also try weighing out portions on a kitchen scale. With time, you will be able to estimate serving sizes for some foods.  · Take some time to put servings of different foods on your favorite plates, bowls, and cups so you know what a serving looks like.  · Try not to eat straight from a bag or box. Doing this can lead to overeating. Put the amount you would like to eat in a cup or on a plate to make sure you are eating the right portion.  · Use smaller plates, glasses, and bowls to prevent overeating.  · Try not to multitask (for example, watch TV or use your computer) while eating. If it is time to eat, sit down at a table and enjoy your food. This will help you to know when you are full. It will also help you to be aware of what you are eating and how much you are eating.  What are tips for following this plan?  Reading food labels  · Check the calorie count compared to the serving size. The serving size may be smaller than what you are used to eating.  · Check the source of the calories. Make sure the food you are eating is high in vitamins and protein and low in saturated and trans fats.  Shopping  · Read nutrition labels while you shop. This will help you make healthy decisions before you decide to purchase your food.  · Make a grocery list and stick to it.  Cooking  · Try to cook your favorite foods in a healthier way. For example, try baking instead of frying.  · Use low-fat dairy products.  Meal planning  · Use more fruits and vegetables. Half of your plate should be fruits and vegetables.  · Include lean proteins like poultry and fish.  How do I count calories when eating out?  · Ask for smaller portion sizes.  · Consider sharing an entree and sides instead of getting your own entree.  · If you get your own entree, eat only half. Ask for a box at the beginning of your meal and put the rest of your entree in it so you are not tempted to eat it.  · If calories are listed on the menu, choose the lower calorie  "options.  · Choose dishes that include vegetables, fruits, whole grains, low-fat dairy products, and lean protein.  · Choose items that are boiled, broiled, grilled, or steamed. Stay away from items that are buttered, battered, fried, or served with cream sauce. Items labeled \"crispy\" are usually fried, unless stated otherwise.  · Choose water, low-fat milk, unsweetened iced tea, or other drinks without added sugar. If you want an alcoholic beverage, choose a lower calorie option such as a glass of wine or light beer.  · Ask for dressings, sauces, and syrups on the side. These are usually high in calories, so you should limit the amount you eat.  · If you want a salad, choose a garden salad and ask for grilled meats. Avoid extra toppings like rodriguez, cheese, or fried items. Ask for the dressing on the side, or ask for olive oil and vinegar or lemon to use as dressing.  · Estimate how many servings of a food you are given. For example, a serving of cooked rice is ½ cup or about the size of half a baseball. Knowing serving sizes will help you be aware of how much food you are eating at restaurants. The list below tells you how big or small some common portion sizes are based on everyday objects:  ? 1 oz--4 stacked dice.  ? 3 oz--1 deck of cards.  ? 1 tsp--1 die.  ? 1 Tbsp--½ a ping-pong ball.  ? 2 Tbsp--1 ping-pong ball.  ? ½ cup--½ baseball.  ? 1 cup--1 baseball.  Summary  · Calorie counting means keeping track of how many calories you eat and drink each day. If you eat fewer calories than your body needs, you should lose weight.  · A healthy amount of weight to lose per week is usually 1-2 lb (0.5-0.9 kg). This usually means reducing your daily calorie intake by 500-750 calories.  · The number of calories in a food can be found on a Nutrition Facts label. If a food does not have a Nutrition Facts label, try to look up the calories online or ask your dietitian for help.  · Use your calories on foods and drinks that " will fill you up, and not on foods and drinks that will leave you hungry.  · Use smaller plates, glasses, and bowls to prevent overeating.  This information is not intended to replace advice given to you by your health care provider. Make sure you discuss any questions you have with your health care provider.  Document Revised: 09/06/2019 Document Reviewed: 11/17/2017  [employer] Patient Education © 2020 [employer]      Calorie Counting for Weight Loss  Calories are units of energy. Your body needs a certain amount of calories from food to keep you going throughout the day. When you eat more calories than your body needs, your body stores the extra calories as fat. When you eat fewer calories than your body needs, your body burns fat to get the energy it needs.  Calorie counting means keeping track of how many calories you eat and drink each day. Calorie counting can be helpful if you need to lose weight. If you make sure to eat fewer calories than your body needs, you should lose weight. Ask your health care provider what a healthy weight is for you.  For calorie counting to work, you will need to eat the right number of calories in a day in order to lose a healthy amount of weight per week. A dietitian can help you determine how many calories you need in a day and will give you suggestions on how to reach your calorie goal.  · A healthy amount of weight to lose per week is usually 1-2 lb (0.5-0.9 kg). This usually means that your daily calorie intake should be reduced by 500-750 calories.  · Eating 1,200 - 1,500 calories per day can help most women lose weight.  · Eating 1,500 - 1,800 calories per day can help most men lose weight.  What is my plan?  My goal is to have __________ calories per day.  If I have this many calories per day, I should lose around __________ pounds per week.  What do I need to know about calorie counting?  In order to meet your daily calorie goal, you will need to:  · Find out how many  calories are in each food you would like to eat. Try to do this before you eat.  · Decide how much of the food you plan to eat.  · Write down what you ate and how many calories it had. Doing this is called keeping a food log.  To successfully lose weight, it is important to balance calorie counting with a healthy lifestyle that includes regular activity. Aim for 150 minutes of moderate exercise (such as walking) or 75 minutes of vigorous exercise (such as running) each week.  Where do I find calorie information?    The number of calories in a food can be found on a Nutrition Facts label. If a food does not have a Nutrition Facts label, try to look up the calories online or ask your dietitian for help.  Remember that calories are listed per serving. If you choose to have more than one serving of a food, you will have to multiply the calories per serving by the amount of servings you plan to eat. For example, the label on a package of bread might say that a serving size is 1 slice and that there are 90 calories in a serving. If you eat 1 slice, you will have eaten 90 calories. If you eat 2 slices, you will have eaten 180 calories.  How do I keep a food log?  Immediately after each meal, record the following information in your food log:  · What you ate. Don't forget to include toppings, sauces, and other extras on the food.  · How much you ate. This can be measured in cups, ounces, or number of items.  · How many calories each food and drink had.  · The total number of calories in the meal.  Keep your food log near you, such as in a small notebook in your pocket, or use a mobile eloisa or website. Some programs will calculate calories for you and show you how many calories you have left for the day to meet your goal.  What are some calorie counting tips?    · Use your calories on foods and drinks that will fill you up and not leave you hungry:  ? Some examples of foods that fill you up are nuts and nut butters,  "vegetables, lean proteins, and high-fiber foods like whole grains. High-fiber foods are foods with more than 5 g fiber per serving.  ? Drinks such as sodas, specialty coffee drinks, alcohol, and juices have a lot of calories, yet do not fill you up.  · Eat nutritious foods and avoid empty calories. Empty calories are calories you get from foods or beverages that do not have many vitamins or protein, such as candy, sweets, and soda. It is better to have a nutritious high-calorie food (such as an avocado) than a food with few nutrients (such as a bag of chips).  · Know how many calories are in the foods you eat most often. This will help you calculate calorie counts faster.  · Pay attention to calories in drinks. Low-calorie drinks include water and unsweetened drinks.  · Pay attention to nutrition labels for \"low fat\" or \"fat free\" foods. These foods sometimes have the same amount of calories or more calories than the full fat versions. They also often have added sugar, starch, or salt, to make up for flavor that was removed with the fat.  · Find a way of tracking calories that works for you. Get creative. Try different apps or programs if writing down calories does not work for you.  What are some portion control tips?  · Know how many calories are in a serving. This will help you know how many servings of a certain food you can have.  · Use a measuring cup to measure serving sizes. You could also try weighing out portions on a kitchen scale. With time, you will be able to estimate serving sizes for some foods.  · Take some time to put servings of different foods on your favorite plates, bowls, and cups so you know what a serving looks like.  · Try not to eat straight from a bag or box. Doing this can lead to overeating. Put the amount you would like to eat in a cup or on a plate to make sure you are eating the right portion.  · Use smaller plates, glasses, and bowls to prevent overeating.  · Try not to multitask " "(for example, watch TV or use your computer) while eating. If it is time to eat, sit down at a table and enjoy your food. This will help you to know when you are full. It will also help you to be aware of what you are eating and how much you are eating.  What are tips for following this plan?  Reading food labels  · Check the calorie count compared to the serving size. The serving size may be smaller than what you are used to eating.  · Check the source of the calories. Make sure the food you are eating is high in vitamins and protein and low in saturated and trans fats.  Shopping  · Read nutrition labels while you shop. This will help you make healthy decisions before you decide to purchase your food.  · Make a grocery list and stick to it.  Cooking  · Try to cook your favorite foods in a healthier way. For example, try baking instead of frying.  · Use low-fat dairy products.  Meal planning  · Use more fruits and vegetables. Half of your plate should be fruits and vegetables.  · Include lean proteins like poultry and fish.  How do I count calories when eating out?  · Ask for smaller portion sizes.  · Consider sharing an entree and sides instead of getting your own entree.  · If you get your own entree, eat only half. Ask for a box at the beginning of your meal and put the rest of your entree in it so you are not tempted to eat it.  · If calories are listed on the menu, choose the lower calorie options.  · Choose dishes that include vegetables, fruits, whole grains, low-fat dairy products, and lean protein.  · Choose items that are boiled, broiled, grilled, or steamed. Stay away from items that are buttered, battered, fried, or served with cream sauce. Items labeled \"crispy\" are usually fried, unless stated otherwise.  · Choose water, low-fat milk, unsweetened iced tea, or other drinks without added sugar. If you want an alcoholic beverage, choose a lower calorie option such as a glass of wine or light beer.  · Ask " for dressings, sauces, and syrups on the side. These are usually high in calories, so you should limit the amount you eat.  · If you want a salad, choose a garden salad and ask for grilled meats. Avoid extra toppings like rodriguez, cheese, or fried items. Ask for the dressing on the side, or ask for olive oil and vinegar or lemon to use as dressing.  · Estimate how many servings of a food you are given. For example, a serving of cooked rice is ½ cup or about the size of half a baseball. Knowing serving sizes will help you be aware of how much food you are eating at restaurants. The list below tells you how big or small some common portion sizes are based on everyday objects:  ? 1 oz--4 stacked dice.  ? 3 oz--1 deck of cards.  ? 1 tsp--1 die.  ? 1 Tbsp--½ a ping-pong ball.  ? 2 Tbsp--1 ping-pong ball.  ? ½ cup--½ baseball.  ? 1 cup--1 baseball.  Summary  · Calorie counting means keeping track of how many calories you eat and drink each day. If you eat fewer calories than your body needs, you should lose weight.  · A healthy amount of weight to lose per week is usually 1-2 lb (0.5-0.9 kg). This usually means reducing your daily calorie intake by 500-750 calories.  · The number of calories in a food can be found on a Nutrition Facts label. If a food does not have a Nutrition Facts label, try to look up the calories online or ask your dietitian for help.  · Use your calories on foods and drinks that will fill you up, and not on foods and drinks that will leave you hungry.  · Use smaller plates, glasses, and bowls to prevent overeating.  This information is not intended to replace advice given to you by your health care provider. Make sure you discuss any questions you have with your health care provider.  Document Revised: 09/06/2019 Document Reviewed: 11/17/2017  [employer] Patient Education © 2020 [employer]      Diabetes Mellitus and Exercise  Exercising regularly is important for your overall health, especially when  you have diabetes (diabetes mellitus). Exercising is not only about losing weight. It has many other health benefits, such as increasing muscle strength and bone density and reducing body fat and stress. This leads to improved fitness, flexibility, and endurance, all of which result in better overall health.  Exercise has additional benefits for people with diabetes, including:  · Reducing appetite.  · Helping to lower and control blood glucose.  · Lowering blood pressure.  · Helping to control amounts of fatty substances (lipids) in the blood, such as cholesterol and triglycerides.  · Helping the body to respond better to insulin (improving insulin sensitivity).  · Reducing how much insulin the body needs.  · Decreasing the risk for heart disease by:  ? Lowering cholesterol and triglyceride levels.  ? Increasing the levels of good cholesterol.  ? Lowering blood glucose levels.  What is my activity plan?  Your health care provider or certified diabetes educator can help you make a plan for the type and frequency of exercise (activity plan) that works for you. Make sure that you:  · Do at least 150 minutes of moderate-intensity or vigorous-intensity exercise each week. This could be brisk walking, biking, or water aerobics.  ? Do stretching and strength exercises, such as yoga or weightlifting, at least 2 times a week.  ? Spread out your activity over at least 3 days of the week.  · Get some form of physical activity every day.  ? Do not go more than 2 days in a row without some kind of physical activity.  ? Avoid being inactive for more than 30 minutes at a time. Take frequent breaks to walk or stretch.  · Choose a type of exercise or activity that you enjoy, and set realistic goals.  · Start slowly, and gradually increase the intensity of your exercise over time.  What do I need to know about managing my diabetes?    · Check your blood glucose before and after exercising.  ? If your blood glucose is 240 mg/dL (13.3  mmol/L) or higher before you exercise, check your urine for ketones. If you have ketones in your urine, do not exercise until your blood glucose returns to normal.  ? If your blood glucose is 100 mg/dL (5.6 mmol/L) or lower, eat a snack containing 15-20 grams of carbohydrate. Check your blood glucose 15 minutes after the snack to make sure that your level is above 100 mg/dL (5.6 mmol/L) before you start your exercise.  · Know the symptoms of low blood glucose (hypoglycemia) and how to treat it. Your risk for hypoglycemia increases during and after exercise. Common symptoms of hypoglycemia can include:  ? Hunger.  ? Anxiety.  ? Sweating and feeling clammy.  ? Confusion.  ? Dizziness or feeling light-headed.  ? Increased heart rate or palpitations.  ? Blurry vision.  ? Tingling or numbness around the mouth, lips, or tongue.  ? Tremors or shakes.  ? Irritability.  · Keep a rapid-acting carbohydrate snack available before, during, and after exercise to help prevent or treat hypoglycemia.  · Avoid injecting insulin into areas of the body that are going to be exercised. For example, avoid injecting insulin into:  ? The arms, when playing tennis.  ? The legs, when jogging.  · Keep records of your exercise habits. Doing this can help you and your health care provider adjust your diabetes management plan as needed. Write down:  ? Food that you eat before and after you exercise.  ? Blood glucose levels before and after you exercise.  ? The type and amount of exercise you have done.  ? When your insulin is expected to peak, if you use insulin. Avoid exercising at times when your insulin is peaking.  · When you start a new exercise or activity, work with your health care provider to make sure the activity is safe for you, and to adjust your insulin, medicines, or food intake as needed.  · Drink plenty of water while you exercise to prevent dehydration or heat stroke. Drink enough fluid to keep your urine clear or pale  yellow.  Summary  · Exercising regularly is important for your overall health, especially when you have diabetes (diabetes mellitus).  · Exercising has many health benefits, such as increasing muscle strength and bone density and reducing body fat and stress.  · Your health care provider or certified diabetes educator can help you make a plan for the type and frequency of exercise (activity plan) that works for you.  · When you start a new exercise or activity, work with your health care provider to make sure the activity is safe for you, and to adjust your insulin, medicines, or food intake as needed.  This information is not intended to replace advice given to you by your health care provider. Make sure you discuss any questions you have with your health care provider.  Document Revised: 07/12/2018 Document Reviewed: 05/29/2017  [employer] Patient Education © 2020 [employer]

## (undated) DEVICE — SINGLE-USE BIOPSY FORCEPS: Brand: RADIAL JAW 4

## (undated) DEVICE — TRAP SXN POLYP QUICKCATCH LF

## (undated) DEVICE — Device: Brand: DISPOSABLE ELECTROSURGICAL SNARE

## (undated) DEVICE — CANN SMPL SOFTECH BIFLO ETCO2 A/M 7FT